# Patient Record
Sex: MALE | Race: BLACK OR AFRICAN AMERICAN | NOT HISPANIC OR LATINO | Employment: OTHER | URBAN - METROPOLITAN AREA
[De-identification: names, ages, dates, MRNs, and addresses within clinical notes are randomized per-mention and may not be internally consistent; named-entity substitution may affect disease eponyms.]

---

## 2017-02-06 ENCOUNTER — TELEPHONE (OUTPATIENT)
Dept: SMOKING CESSATION | Facility: CLINIC | Age: 67
End: 2017-02-06

## 2017-02-07 ENCOUNTER — TELEPHONE (OUTPATIENT)
Dept: SMOKING CESSATION | Facility: CLINIC | Age: 67
End: 2017-02-07

## 2017-02-13 ENCOUNTER — TELEPHONE (OUTPATIENT)
Dept: SMOKING CESSATION | Facility: CLINIC | Age: 67
End: 2017-02-13

## 2017-02-13 NOTE — TELEPHONE ENCOUNTER
3rd attempt unable to leave message regarding smoking cessation quit 1 episode will attempt another call at a later date.

## 2017-06-23 ENCOUNTER — TELEPHONE (OUTPATIENT)
Dept: INTERNAL MEDICINE | Facility: CLINIC | Age: 67
End: 2017-06-23

## 2017-06-23 NOTE — TELEPHONE ENCOUNTER
----- Message from Milla Nava sent at 6/22/2017  5:31 PM CDT -----  Contact: pt  PT lost his wife and would like to have someone to speak with and would prefer to speak to Dr. Tesfaye about it all.    PT callback:  [phone]

## 2017-06-24 ENCOUNTER — TELEPHONE (OUTPATIENT)
Dept: INTERNAL MEDICINE | Facility: CLINIC | Age: 67
End: 2017-06-24

## 2017-06-24 NOTE — TELEPHONE ENCOUNTER
----- Message from Harmony Alejandro sent at 6/23/2017  3:40 PM CDT -----  Contact: self [phone]  Patient  Is returning MD call . Please advise , Thanks !

## 2017-06-26 ENCOUNTER — TELEPHONE (OUTPATIENT)
Dept: INTERNAL MEDICINE | Facility: CLINIC | Age: 67
End: 2017-06-26

## 2017-06-26 NOTE — TELEPHONE ENCOUNTER
----- Message from Norma Flowers sent at 6/23/2017  4:49 PM CDT -----  Contact: Self/[phone]  Requesting an earlier appt date or time    Next available appt: 07/24/2017    Nature of the appt: Patient lost his spouse and needs to see you and also get a referral to speak to someone. Would like to get in the beginning of next week if next week if possible.    Does patient have appt scheduled?: No    Please call and advise.    Thanks

## 2017-07-10 ENCOUNTER — OFFICE VISIT (OUTPATIENT)
Dept: INTERNAL MEDICINE | Facility: CLINIC | Age: 67
End: 2017-07-10
Payer: MEDICARE

## 2017-07-10 ENCOUNTER — TELEPHONE (OUTPATIENT)
Dept: INTERNAL MEDICINE | Facility: CLINIC | Age: 67
End: 2017-07-10

## 2017-07-10 VITALS
HEART RATE: 85 BPM | SYSTOLIC BLOOD PRESSURE: 122 MMHG | BODY MASS INDEX: 20.67 KG/M2 | HEIGHT: 69 IN | WEIGHT: 139.56 LBS | DIASTOLIC BLOOD PRESSURE: 83 MMHG

## 2017-07-10 DIAGNOSIS — Z72.6 GAMBLING PROBLEM: ICD-10-CM

## 2017-07-10 DIAGNOSIS — F32.1 MODERATE SINGLE CURRENT EPISODE OF MAJOR DEPRESSIVE DISORDER: Primary | ICD-10-CM

## 2017-07-10 DIAGNOSIS — F43.21 GRIEF REACTION: ICD-10-CM

## 2017-07-10 DIAGNOSIS — F17.210 CIGARETTE SMOKER: ICD-10-CM

## 2017-07-10 PROCEDURE — 99213 OFFICE O/P EST LOW 20 MIN: CPT | Mod: PBBFAC | Performed by: INTERNAL MEDICINE

## 2017-07-10 PROCEDURE — 99214 OFFICE O/P EST MOD 30 MIN: CPT | Mod: S$PBB,,, | Performed by: INTERNAL MEDICINE

## 2017-07-10 PROCEDURE — 99999 PR PBB SHADOW E&M-EST. PATIENT-LVL III: CPT | Mod: PBBFAC,,, | Performed by: INTERNAL MEDICINE

## 2017-07-10 RX ORDER — SERTRALINE HYDROCHLORIDE 50 MG/1
50 TABLET, FILM COATED ORAL DAILY
Qty: 30 TABLET | Refills: 5 | Status: CANCELLED | OUTPATIENT
Start: 2017-07-10 | End: 2018-07-10

## 2017-07-10 RX ORDER — SERTRALINE HYDROCHLORIDE 50 MG/1
50 TABLET, FILM COATED ORAL DAILY
Qty: 30 TABLET | Refills: 11 | Status: SHIPPED | OUTPATIENT
Start: 2017-07-10 | End: 2017-11-09

## 2017-07-10 NOTE — TELEPHONE ENCOUNTER
----- Message from Norma Flowers sent at 7/10/2017  2:30 PM CDT -----  Contact: Self/[phone]  Patient was calling to get an update on the RX zoloft that was suppose to be called in to day after his visit.    Please call and advise.    Thank You

## 2017-07-10 NOTE — PROGRESS NOTES
"Subjective:       Patient ID: William Bueno is a 66 y.o. male.    Chief Complaint: Depression    HPI   Wife  2016 and in December his brother became ill with cancer recurrence and  in May.  He was very close to brother.  Feels exhausted and now aware f the great loss he has experienced..  Feels overwhelmed.  Feels sad.  previous depression in the 70's, tx with "vallium".  Appetite poor; lost at least 30 lbs.   May spend all day in bed.  Feels better at work, at desk, as patrolman.    Avoiding family, including first grandchild..  Wife suddenly at work as sitter.  He was  in  to his soul mate.  She feels like something was taken from here.  Feels "like a real part of him is gone"  Feels like he doesn't have closure with wife's death.  No suicidal thoughts - "too messy"  - doesn't want to hurt anyone else.  Also gambling, as he did before marrying   At that time was also abusing crack cocaine.  Friday gambled and drank 5 years.  That scared him and let him to make this appt.    Back smoking.  Dropped out of smoking cessation clinic.    Review of Systems   Constitutional: Negative for activity change and unexpected weight change.   Respiratory: Negative for chest tightness and shortness of breath.    Cardiovascular: Negative for chest pain and leg swelling.   Gastrointestinal: Negative for abdominal pain.   Neurological: Negative for headaches.   Psychiatric/Behavioral: Positive for dysphoric mood. Negative for sleep disturbance and suicidal ideas. The patient is nervous/anxious.        Objective:      Physical Exam   Constitutional: He is oriented to person, place, and time. He appears well-developed and well-nourished. No distress.   Neurological: He is alert and oriented to person, place, and time.   Skin: He is not diaphoretic.   Psychiatric: He has a normal mood and affect. His behavior is normal. Judgment and thought content normal.       Assessment:       1. Moderate single current " episode of major depressive disorder    2. Grief reaction    3. Gambling problem    4. Cigarette smoker        Plan:       William was seen today for depression.    Diagnoses and all orders for this visit:    Moderate single current episode of major depressive disorder    Grief reaction    Gambling problem    Cigarette smoker    Other orders  -     Cancel: sertraline (ZOLOFT) 50 MG tablet; Take 1 tablet (50 mg total) by mouth once daily.  -     sertraline (ZOLOFT) 50 MG tablet; Take 1 tablet (50 mg total) by mouth once daily.       Encouraged to begin therapy through Psychiatry.     Encouraged to stop counseling.      RTC few weeks.

## 2017-07-24 ENCOUNTER — OFFICE VISIT (OUTPATIENT)
Dept: PSYCHIATRY | Facility: CLINIC | Age: 67
End: 2017-07-24
Payer: MEDICARE

## 2017-07-24 DIAGNOSIS — F43.23 ADJUSTMENT DISORDER WITH MIXED ANXIETY AND DEPRESSED MOOD: Primary | ICD-10-CM

## 2017-07-24 PROCEDURE — 90791 PSYCH DIAGNOSTIC EVALUATION: CPT | Mod: PBBFAC | Performed by: SOCIAL WORKER

## 2017-07-24 PROCEDURE — 90791 PSYCH DIAGNOSTIC EVALUATION: CPT | Mod: S$PBB,,, | Performed by: SOCIAL WORKER

## 2017-07-25 ENCOUNTER — OFFICE VISIT (OUTPATIENT)
Dept: PSYCHIATRY | Facility: CLINIC | Age: 67
End: 2017-07-25
Payer: MEDICARE

## 2017-07-25 VITALS
HEART RATE: 108 BPM | DIASTOLIC BLOOD PRESSURE: 75 MMHG | SYSTOLIC BLOOD PRESSURE: 111 MMHG | HEIGHT: 69 IN | BODY MASS INDEX: 20.32 KG/M2 | WEIGHT: 137.19 LBS

## 2017-07-25 DIAGNOSIS — F41.1 GAD (GENERALIZED ANXIETY DISORDER): ICD-10-CM

## 2017-07-25 DIAGNOSIS — F33.1 MODERATE EPISODE OF RECURRENT MAJOR DEPRESSIVE DISORDER: Primary | ICD-10-CM

## 2017-07-25 DIAGNOSIS — G47.00 INSOMNIA, UNSPECIFIED TYPE: ICD-10-CM

## 2017-07-25 PROCEDURE — 99999 PR PBB SHADOW E&M-EST. PATIENT-LVL III: CPT | Mod: PBBFAC,,, | Performed by: INTERNAL MEDICINE

## 2017-07-25 PROCEDURE — 90792 PSYCH DIAG EVAL W/MED SRVCS: CPT | Mod: S$PBB,,, | Performed by: INTERNAL MEDICINE

## 2017-07-25 PROCEDURE — 99213 OFFICE O/P EST LOW 20 MIN: CPT | Mod: PBBFAC | Performed by: INTERNAL MEDICINE

## 2017-07-25 PROCEDURE — 90792 PSYCH DIAG EVAL W/MED SRVCS: CPT | Mod: PBBFAC | Performed by: INTERNAL MEDICINE

## 2017-07-25 NOTE — PROGRESS NOTES
"OUTPATIENT PSYCHIATRY INITIAL VISIT    ENCOUNTER DATE:  2017  SITE:  [facility]  REFFERAL SOURCE:  Self, Aaareferral  LENGTH OF SESSION:  50 minutes    CHIEF COMPLAINT:   Depression and Anxiety    HISTORY OF PRESENTING ILLNESS:  William Bueno is a 66 y.o. male with history of depression and anxiety who presents for initial assessment.  He was seen for intake by Gordo Bernstein LCSW, yesterday and it was recommended that he see Psychiatry for medication management.  He was started on Zoloft 25mg x 5 days with instructions to increase to 50mg daily by PCP on 7/10/17.    History as told by patient:  Feels "confused now."  Says he has grown up with strong women in his life.  States the women always did everything for him.  Wife passed away 1 year and 1 month ago.  She did so much for him.  Everything now seems overwhleming.   7 years, but imagined growing old with her.  Feels angry when he sees other older couples - why can't that be him.  Has good family support.  Also describes brother who  several months after wife passed away.  Says he got to speak to brother on his death bed.  But he never got to say goodbye to his wife, who  when she was at work.  Feels he is finally starting to grive her death.  Has moved out of their house into an apartment.  Money also stressful - how will he afford apartment.  Describes himself as a quiet person.  Feels alone during the day.  So now has tried to work 6 days a week sometimes 12 hours per day.  Recently has decreased his time of working (made change last week).  Has trouble saying no to people - wife used to buffer this some.  Now back to doing anything people ask.  Always wants to please people.  About 2 weeks ago started going to the [employer], which was an old pattern that he had stopped.  Drank 5 beers which is not him.  Feels exhausted and tired so much of the time.  Not sleeping well.  Trouble falling asleep and staying asleep.  " Yesterday took 2 hour nap during the day.  But only slept from 3am to 6am.  Feels mind is running and won't turn off.  Constantly worrying about things.  Wants help getting back on track.  Very hurtful that she is not here anymore - feels cheated.  Mood is down every day - misses laughing and smiling - just wants to be normal.  Laughed this morning and it felt so good.  Has always liked to get things accomplished and had big goals.  Has not enjoyed things since she , however on  went to see grandson which he enjoyed (had not seen in 3 months).  Was enjoying going to work but does not anymore.  Now just watches TV.  Would like to go to park and meet with other seniors for activities outside.  Trouble making himself do those things.  Has lost 30 pounds since her death and brother's - gained 1 pound today.  Last 2 days ate really well.  Now trying to cook more.  Has periods of hopelessness, but says he is optomistic overall - feels he just needs to work through this.  Knows he needs to take care of himself.  Says he has accomplished so much in life except doing things for himself.  Very Nondenominational.  Knows he has been blessed in his life.  Likes being around people, traveling, looking neat and nice.  Wife taught him to be more outgoing.  Started taking Zoloft on 17 - was taking 25mg at first - has only been on 50mg for 1 week, missed some doses.  Feels Zoloft has slowed him down so that he can concentrate on things.  Taking his time more.  Enjoying himself more.  Has been able to think through things more.  Feels it is working well.  Denies history of panic attacks, sandy, psychosis, or HI.    REVIEW OF SYSTEMS:  Complete review of systems performed covering Constitutional, Eyes, ENT/Mouth, Cardiovascular, Respiratory, Gastrointestinal, Genitourinary, Musculoskeletal, Skin, Neurologic, Endocrine, and Allergy/Immune.  All systems negative except for that covered in HPI.    Psych ROS covered elsewhere in  note (HPI)    PAST PSYCHIATRIC HISTORY:  Previous Psychiatric Diagnoses:  Depression and anxiety in the 1990's when mother passed away.  Previous Psychiatric Hospitalizations:  Denies   Previous SI/HI:  Denies  Previous Suicide Attempts:  Denies   Previous Medication Trials:  Was on Valium in 1990's  Psychiatric Care (current & past):  Saw psychiatrist in 1990's  History of Psychotherapy:  Just started seeing Gordo Bernstein, saw someone in 1990's  History of Violence:  Denies    SUBSTANCE ABUSE HISTORY:  Tobacco:  Smokes 1/2 ppd  Alcohol:  Used to drink a 6 pack per week - currently not drinking  Illicit Substances:  Previous history of crack cocaine abuse (stopped 2006) - in remission  Misuse of Prescription Medications:  Denies    MEDICAL HISTORY:  Past Medical History:   Diagnosis Date    Hypertension      NEUROLOGIC HISTORY:  Seizures:  Denies  Head trauma:  Denies    SOCIAL HISTORY:  History of Physical/Sexual Abuse:  Denies  Education:  High school graduate.  Employment:  Patient works as a  for [employer].  Relationship Status/Sexual Orientation:   and once .    Children:  One son (31), one daughter (37).  Housing Status:  Living alone  Restorationist:  Worship.   History:  Was in the [employer].  Access to Gun:  Leaves at work  Legal History:  Unpaid child support in 1996    FAMILY HISTORY:  Psychiatric:  Denies       H/o suicide:  Denies    MEDICATIONS:    Current Outpatient Prescriptions:     nicotine (NICODERM CQ) 14 mg/24 hr, Place 1 patch onto the skin once daily., Disp: 28 patch, Rfl: 0    nicotine polacrilex 4 MG Lozg, Nicorette Mini lozenges -one 4 mg lozenge by mouth as needed, max 5 in 6 hr period., Disp: 168 lozenge, Rfl: 0    sertraline (ZOLOFT) 50 MG tablet, Take 1 tablet (50 mg total) by mouth once daily., Disp: 30 tablet, Rfl: 11    tadalafil (CIALIS) 20 MG Tab, Half-1 tabs po q day prn, Disp: 10 tablet, Rfl: 0    varenicline (CHANTIX) 1 mg  "Tab, Take 1 tablet (1 mg total) by mouth 2 (two) times daily., Disp: 56 tablet, Rfl: 0    ALLERGIES:  Review of patient's allergies indicates:  No Known Allergies    PSYCHIATRIC EXAM:  Vitals:    07/25/17 0755   BP: 111/75   Pulse: 108   Weight: 62.2 kg (137 lb 3.2 oz)   Height: 5' 9" (1.753 m)     Appearance:  Well groomed, appearing healthy and of stated age  Behavior:  Cooperative, pleasant, no psychomotor agitation or retardation  Speech:  Normal rate, rhythm, prosody, and volume  Mood:  "Down"  Affect:  Congruent but positive  Thought Process:  Linear, logical, goal directed  Thought Content:  Negative for suicidal ideation, homicidal ideation, delusions or hallucinations.  Associations:  Intact  Memory:  Grossly Intact  Level of Consciousness/Orientation:  Grossly intact  Fund of Knowledge:  Good  Attention:  Good  Language:  Fluent, able to name abstract and concrete objects  Insight:  Good  Judgment:  Intact  Psychomotor signs:  No involuntary movements or tremor  Gait:  Normal    RELEVANT LABS/STUDIES:    Lab Results   Component Value Date    WBC 4.36 07/15/2015    HGB 12.2 (L) 07/15/2015    HCT 37.5 (L) 07/15/2015    MCV 94 07/15/2015     07/15/2015     BMP  Lab Results   Component Value Date     04/20/2015    K 3.8 04/20/2015     04/20/2015    CO2 25 04/20/2015    BUN 9 04/20/2015    CREATININE 1.3 04/20/2015    CALCIUM 9.5 04/20/2015    ANIONGAP 10 04/20/2015    ESTGFRAFRICA >60.0 04/20/2015    EGFRNONAA 58.1 (A) 04/20/2015     Lab Results   Component Value Date    ALT 13 04/15/2015    AST 18 04/15/2015    ALKPHOS 117 04/15/2015    BILITOT 0.7 04/15/2015     Lab Results   Component Value Date    TSH 0.748 04/15/2015       IMPRESSION:    William Bueno is a 66 y.o. male with history of depression and anxiety who presents for initial assessment in the setting of grieving the loss of his wife and brother over the last year.    DIAGNOSES:    ICD-10-CM ICD-9-CM   1. Moderate episode of " recurrent major depressive disorder F33.1 296.32   2. ARSEN (generalized anxiety disorder) F41.1 300.02   3. Insomnia, unspecified type G47.00 780.52     PLAN:  · Patient seems to be improving on Zoloft.  Will continue Zoloft 50mg daily for 1 month and then reassess symptoms.    · Patient declining medication for sleep at this time.  He feels that as his anxiety is improving with Zoloft, his sleep will also improve.  Discussed over-the-counter options of Benadryl and melatonin.  · Patient expresses understanding and agrees with plan.    RETURN TO CLINIC:  Return in about 4 weeks (around 8/22/2017).

## 2017-08-16 ENCOUNTER — TELEPHONE (OUTPATIENT)
Dept: SMOKING CESSATION | Facility: CLINIC | Age: 67
End: 2017-08-16

## 2017-08-17 ENCOUNTER — TELEPHONE (OUTPATIENT)
Dept: SMOKING CESSATION | Facility: CLINIC | Age: 67
End: 2017-08-17

## 2017-08-18 ENCOUNTER — TELEPHONE (OUTPATIENT)
Dept: SMOKING CESSATION | Facility: CLINIC | Age: 67
End: 2017-08-18

## 2017-08-18 NOTE — TELEPHONE ENCOUNTER
3rd attempt; 1 year telephone follow up regarding smoking cessation quit episode #2. Will resolve this episode.

## 2017-08-21 ENCOUNTER — TELEPHONE (OUTPATIENT)
Dept: INTERNAL MEDICINE | Facility: CLINIC | Age: 67
End: 2017-08-21

## 2017-08-21 NOTE — TELEPHONE ENCOUNTER
----- Message from Rene Martins sent at 8/21/2017  2:55 PM CDT -----  Contact: Self [phone]  Type: Returning a call    Who left a message? ?    When did the practice call? Just a few minutes ago    Comments:Requesting a call back, advice    Thanks

## 2017-10-11 ENCOUNTER — OFFICE VISIT (OUTPATIENT)
Dept: INTERNAL MEDICINE | Facility: CLINIC | Age: 67
End: 2017-10-11
Payer: MEDICARE

## 2017-10-11 VITALS
SYSTOLIC BLOOD PRESSURE: 140 MMHG | OXYGEN SATURATION: 98 % | DIASTOLIC BLOOD PRESSURE: 90 MMHG | TEMPERATURE: 99 F | BODY MASS INDEX: 20.35 KG/M2 | WEIGHT: 137.38 LBS | HEIGHT: 69 IN | HEART RATE: 75 BPM

## 2017-10-11 DIAGNOSIS — K40.90 NON-RECURRENT UNILATERAL INGUINAL HERNIA WITHOUT OBSTRUCTION OR GANGRENE: Primary | ICD-10-CM

## 2017-10-11 PROCEDURE — 99214 OFFICE O/P EST MOD 30 MIN: CPT | Mod: PBBFAC | Performed by: INTERNAL MEDICINE

## 2017-10-11 PROCEDURE — 99999 PR PBB SHADOW E&M-EST. PATIENT-LVL IV: CPT | Mod: PBBFAC,,, | Performed by: INTERNAL MEDICINE

## 2017-10-11 PROCEDURE — 99213 OFFICE O/P EST LOW 20 MIN: CPT | Mod: S$PBB,,, | Performed by: INTERNAL MEDICINE

## 2017-10-11 NOTE — PROGRESS NOTES
Subjective:       Patient ID: William Bueno is a 66 y.o. male.    Chief Complaint: Groin Pain (R side)    Noted lump in right groin  Not tender      Groin Pain   This is a new problem. The current episode started 1 to 4 weeks ago. The problem occurs every several days. The problem has been unchanged. The pain is mild. Pertinent negatives include no abdominal pain, chest pain, constipation, coughing, diarrhea, fever, flank pain, frequency, headaches, nausea, shortness of breath, sore throat or vomiting. The symptoms are aggravated by activity. He has tried nothing for the symptoms. The treatment provided no relief. Sexual activity: wife just .     Review of Systems   Constitutional: Negative for activity change, appetite change and fever.   HENT: Negative for congestion, postnasal drip and sore throat.    Respiratory: Negative for cough, shortness of breath and wheezing.    Cardiovascular: Negative for chest pain and palpitations.   Gastrointestinal: Negative for abdominal pain, blood in stool, constipation, diarrhea, nausea and vomiting.   Genitourinary: Negative for decreased urine volume, difficulty urinating, flank pain and frequency.   Musculoskeletal: Negative for arthralgias.   Neurological: Negative for dizziness, weakness and headaches.       Objective:      Physical Exam   Abdominal:           Assessment:       1. Non-recurrent unilateral inguinal hernia without obstruction or gangrene        Plan:   William was seen today for groin pain.    Diagnoses and all orders for this visit:    Non-recurrent unilateral inguinal hernia without obstruction or gangrene  -     Ambulatory consult to General Surgery

## 2017-10-12 ENCOUNTER — OFFICE VISIT (OUTPATIENT)
Dept: SURGERY | Facility: CLINIC | Age: 67
End: 2017-10-12
Payer: MEDICARE

## 2017-10-12 VITALS
HEART RATE: 71 BPM | SYSTOLIC BLOOD PRESSURE: 156 MMHG | DIASTOLIC BLOOD PRESSURE: 88 MMHG | BODY MASS INDEX: 19.99 KG/M2 | WEIGHT: 135 LBS | TEMPERATURE: 98 F | HEIGHT: 69 IN

## 2017-10-12 DIAGNOSIS — Z01.818 PRE-OP TESTING: Primary | ICD-10-CM

## 2017-10-12 DIAGNOSIS — K40.90 RIGHT INGUINAL HERNIA: ICD-10-CM

## 2017-10-12 PROCEDURE — 99213 OFFICE O/P EST LOW 20 MIN: CPT | Mod: PBBFAC | Performed by: SURGERY

## 2017-10-12 PROCEDURE — 99999 PR PBB SHADOW E&M-EST. PATIENT-LVL III: CPT | Mod: PBBFAC,,, | Performed by: SURGERY

## 2017-10-12 PROCEDURE — 99203 OFFICE O/P NEW LOW 30 MIN: CPT | Mod: S$PBB,,, | Performed by: SURGERY

## 2017-10-12 NOTE — PROGRESS NOTES
"History & Physical    SUBJECTIVE:     History of Present Illness:  Patient is a 66 y.o. male presents with right inguinal hernia.  Patient describes right groin discomfort and pain for the past two months. Pain occurs when lying on right side and stepping up or down. He describes the pain as sharp that radiates medially and inferiorly into the groin and lasts 10-15 seconds. There is no increased pain with increased abdominal pressure or with prolonged walking. Patient also reports pain in the left lateral thigh that is "burning" but likely due to a previous back injury. Patient denies nausea, vomiting, and changes in bowel habits. Patient denies recent illnesses, fevers, or weight loss.       Chief Complaint   Patient presents with    Hernia     [facility]       Review of patient's allergies indicates:  No Known Allergies    Current Outpatient Prescriptions   Medication Sig Dispense Refill    nicotine (NICODERM CQ) 14 mg/24 hr Place 1 patch onto the skin once daily. 28 patch 0    nicotine polacrilex 4 MG Lozg Nicorette Mini lozenges -one 4 mg lozenge by mouth as needed, max 5 in 6 hr period. 168 lozenge 0    sertraline (ZOLOFT) 50 MG tablet Take 1 tablet (50 mg total) by mouth once daily. 30 tablet 11    tadalafil (CIALIS) 20 MG Tab Half-1 tabs po q day prn 10 tablet 0    varenicline (CHANTIX) 1 mg Tab Take 1 tablet (1 mg total) by mouth 2 (two) times daily. 56 tablet 0     No current facility-administered medications for this visit.        Past Medical History:   Diagnosis Date    Hypertension      Past Surgical History:   Procedure Laterality Date    TONSILLECTOMY       Family History   Problem Relation Age of Onset    Diabetes Mother     Heart disease Father      chf     Social History   Substance Use Topics    Smoking status: Former Smoker     Packs/day: 0.50     Years: 45.00     Types: Cigarettes     Quit date: 9/20/2015    Smokeless tobacco: Never Used    Alcohol use 2.4 oz/week     1 Standard drinks or " "equivalent, 3 Cans of beer per week        Review of Systems:  Review of Systems   Constitutional: Negative for activity change, appetite change, fatigue and fever.   HENT: Negative.    Eyes: Negative.    Respiratory: Negative for chest tightness and stridor.    Cardiovascular: Negative for chest pain and leg swelling.   Gastrointestinal: Negative for abdominal distention, abdominal pain, blood in stool, constipation, diarrhea, nausea and vomiting.   Endocrine: Negative.    Genitourinary: Negative for difficulty urinating, dysuria, scrotal swelling and testicular pain.   Musculoskeletal: Positive for back pain and myalgias.        From previous back strain unrelated to acute symptoms.   Skin: Negative.    Allergic/Immunologic: Negative.    Neurological: Negative.    Hematological: Negative.    Psychiatric/Behavioral: Negative.        OBJECTIVE:     Vital Signs (Most Recent)  Temp: 98.3 °F (36.8 °C) (10/12/17 1211)  Pulse: 71 (10/12/17 1211)  BP: (!) 156/88 (10/12/17 1211)  5' 9" (1.753 m)  61.2 kg (135 lb)     Physical Exam:  Physical Exam   Constitutional: He is oriented to person, place, and time. He appears well-developed and well-nourished. No distress.   Cardiovascular: Normal rate and regular rhythm.    Pulmonary/Chest: Effort normal and breath sounds normal.   Abdominal: Soft. Bowel sounds are normal. He exhibits no distension. There is no tenderness. A hernia is present. Hernia confirmed positive in the right inguinal area.       Genitourinary: Testes normal.         Musculoskeletal: Normal range of motion.   Neurological: He is alert and oriented to person, place, and time.   Psychiatric: He has a normal mood and affect. His behavior is normal.       Laboratory  None recent    Diagnostic Results:  None applicable    ASSESSMENT/PLAN:     Rih    PLAN:Plan     For open rih with mesh, MAC.       "

## 2017-10-12 NOTE — Clinical Note
October 12, 2017      Samanta Tomas MD  [address]  [place] [address]           [facility]  [address]  [place] [address]  Phone: [phone]          Patient: William Bueno   MR Number: [id number]   YOB: 1950   Date of Visit: 10/12/2017       Dear Dr. Samanta Tomas:    Thank you for referring William Bueno to me for evaluation. Attached you will find relevant portions of my assessment and plan of care.    If you have questions, please do not hesitate to call me. I look forward to following William Bueno along with you.    Sincerely,    Elder Watson MD    Enclosure  CC:  No Recipients    If you would like to receive this communication electronically, please contact [email or web address] or [phone] to request more information on [employer] Link access.    For providers and/or their staff who would like to refer a patient to [facility], please contact us through our one-stop-shop provider referral line, [facility] , at [phone].    If you feel you have received this communication in error or would no longer like to receive these types of communications, please e-mail [email or web address]

## 2017-10-12 NOTE — LETTER
[facility] - General Surgery  [address]  [place] [address]  Phone: [phone] October 12, 2017      Samanta Tomas MD  [address]  [place] [address]    Patient: William Bueno   MR Number: [id number]   YOB: 1950   Date of Visit: 10/12/2017     Dear Dr. Tomas:    Thank you for referring William Bueno to me for evaluation. Below are the relevant portions of my assessment and plan of care.    Patient presents with RIH.     PLAN:   - For open RIHh with mesh, MAC.     If you have questions, please do not hesitate to call me. I look forward to following William along with you.    Sincerely,      Elder Watson MD   Section Head - General, Laparoscopic, Bariatric  Acute Care and Oncologic Surgery   - Surgical Weight Loss Program  [facility]    WSR/alison    CC  Lexie Tesfaye MD

## 2017-11-02 ENCOUNTER — TELEPHONE (OUTPATIENT)
Dept: INTERNAL MEDICINE | Facility: CLINIC | Age: 67
End: 2017-11-02
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
## 2017-11-03 ENCOUNTER — TELEPHONE (OUTPATIENT)
Dept: INTERNAL MEDICINE | Facility: CLINIC | Age: 67
End: 2017-11-03

## 2017-11-03 NOTE — TELEPHONE ENCOUNTER
----- Message from Lakesha Patton sent at 11/3/2017 10:52 AM CDT -----  Contact: Self/ [phone]   Type: Returning a call    Who left a message? Sofi     When did the practice call? Yesterday     Comments: pt is stated he miss a call from the office. Please call and advise     Thank you

## 2017-11-06 ENCOUNTER — OFFICE VISIT (OUTPATIENT)
Dept: INTERNAL MEDICINE | Facility: CLINIC | Age: 67
End: 2017-11-06
Payer: MEDICARE

## 2017-11-06 ENCOUNTER — LAB VISIT (OUTPATIENT)
Dept: LAB | Facility: HOSPITAL | Age: 67
End: 2017-11-06
Payer: MEDICARE

## 2017-11-06 VITALS
HEART RATE: 91 BPM | OXYGEN SATURATION: 98 % | DIASTOLIC BLOOD PRESSURE: 82 MMHG | WEIGHT: 136.44 LBS | SYSTOLIC BLOOD PRESSURE: 120 MMHG | HEIGHT: 69 IN | BODY MASS INDEX: 20.21 KG/M2

## 2017-11-06 DIAGNOSIS — R41.3 AMNESIA MEMORY LOSS: Primary | ICD-10-CM

## 2017-11-06 DIAGNOSIS — R41.3 AMNESIA MEMORY LOSS: ICD-10-CM

## 2017-11-06 PROCEDURE — 99214 OFFICE O/P EST MOD 30 MIN: CPT | Mod: S$PBB,,, | Performed by: NURSE PRACTITIONER

## 2017-11-06 PROCEDURE — 99214 OFFICE O/P EST MOD 30 MIN: CPT | Mod: PBBFAC | Performed by: NURSE PRACTITIONER

## 2017-11-06 PROCEDURE — 99999 PR PBB SHADOW E&M-EST. PATIENT-LVL IV: CPT | Mod: PBBFAC,,, | Performed by: NURSE PRACTITIONER
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
## 2017-11-08 ENCOUNTER — HOSPITAL ENCOUNTER (OUTPATIENT)
Dept: CARDIOLOGY | Facility: CLINIC | Age: 67
Discharge: HOME | End: 2017-11-08
Attending: SURGERY
Payer: MEDICARE

## 2017-11-08 ENCOUNTER — HOSPITAL ENCOUNTER (OUTPATIENT)
Dept: RADIOLOGY | Facility: HOSPITAL | Age: 67
Discharge: HOME | End: 2017-11-08
Attending: SURGERY
Payer: MEDICARE

## 2017-11-08 DIAGNOSIS — Z01.818 PRE-OP TESTING: ICD-10-CM

## 2017-11-08 PROCEDURE — 93005 ELECTROCARDIOGRAM TRACING: CPT | Mod: PBBFAC | Performed by: INTERNAL MEDICINE

## 2017-11-08 PROCEDURE — 93010 ELECTROCARDIOGRAM REPORT: CPT | Mod: S$PBB,,, | Performed by: INTERNAL MEDICINE

## 2017-11-08 PROCEDURE — 71020 XR CHEST PA AND LATERAL: CPT | Mod: 26,,, | Performed by: RADIOLOGY

## 2017-11-08 PROCEDURE — 71020 XR CHEST PA AND LATERAL: CPT | Mod: TC

## 2017-11-09 ENCOUNTER — TELEPHONE (OUTPATIENT)
Dept: SURGERY | Facility: CLINIC | Age: 67
End: 2017-11-09

## 2017-11-09 ENCOUNTER — OFFICE VISIT (OUTPATIENT)
Dept: CARDIOLOGY | Facility: CLINIC | Age: 67
End: 2017-11-09
Payer: MEDICARE

## 2017-11-09 VITALS
BODY MASS INDEX: 20.24 KG/M2 | DIASTOLIC BLOOD PRESSURE: 89 MMHG | WEIGHT: 136.69 LBS | HEIGHT: 69 IN | SYSTOLIC BLOOD PRESSURE: 139 MMHG | HEART RATE: 80 BPM

## 2017-11-09 DIAGNOSIS — Z01.810 PRE-OPERATIVE CARDIOVASCULAR EXAMINATION: ICD-10-CM

## 2017-11-09 PROCEDURE — 99999 PR PBB SHADOW E&M-EST. PATIENT-LVL III: CPT | Mod: PBBFAC,GC,, | Performed by: STUDENT IN AN ORGANIZED HEALTH CARE EDUCATION/TRAINING PROGRAM

## 2017-11-09 PROCEDURE — 99204 OFFICE O/P NEW MOD 45 MIN: CPT | Mod: S$PBB,GC,, | Performed by: STUDENT IN AN ORGANIZED HEALTH CARE EDUCATION/TRAINING PROGRAM

## 2017-11-09 PROCEDURE — 99213 OFFICE O/P EST LOW 20 MIN: CPT | Mod: PBBFAC | Performed by: STUDENT IN AN ORGANIZED HEALTH CARE EDUCATION/TRAINING PROGRAM

## 2017-11-09 RX ORDER — SERTRALINE HYDROCHLORIDE 50 MG/1
50 TABLET, FILM COATED ORAL DAILY
COMMUNITY
End: 2018-01-11

## 2017-11-09 NOTE — ASSESSMENT & PLAN NOTE
- RCRI Class 1 risk conferring a 0.4% risk of MACE  - Based on AHA/ACC perioperative guidelines, no further cardiac workup necessary prior to surgery  - Would benefit from initiation of anti-hypertensive treatment if systolics stay above 140 consistently, ASCVD risk is also elevated and patient would benefit from statin therapy. Discussed with patient and he will take recommendations into consideration and follow up with his PCP.

## 2017-11-09 NOTE — PROGRESS NOTES
Cardiology Clinic Note  Reason for Visit: Pre-operative cardiac exam    HPI:   67 yo male with a history of hypertension who is referred to cardiology for pre-op cardiac examination for inguinal hernia repair. His HTN was lifestyle controlled and resolved so he is not on any medication for it.     He denies any cardiac history. Currently smoking half a pack of cigarettes per day. Denies early CAD in his family. He has no physical limitations. 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 No difficulty with stairs.     The 10-year ASCVD risk score ([place]alie[place] DU JrJuana, et al., 2013) is: 18.7%    Values used to calculate the score:      Age: 66 years      Sex: Male      Is Non- : Yes      Diabetic: No      Tobacco smoker: Yes      Systolic Blood Pressure: 139 mmHg      Is BP treated: No      HDL Cholesterol: 37 mg/dL      Total Cholesterol: 141 mg/dL  ROS:    Constitution: Negative for fever, chills, weight loss or gain.   HENT: Negative for sore throat, rhinorrhea, or headache.  Eyes: Negative for blurred or double vision.   Cardiovascular: See above  Pulmonary: Negative for SOB   Gastrointestinal: Negative for abdominal pain, nausea, vomiting, or diarrhea.   : Negative for dysuria.   Neurological: Negative for focal weakness or sensory changes.  PMH:     Past Medical History:   Diagnosis Date    Hypertension      Past Surgical History:   Procedure Laterality Date    TONSILLECTOMY       Allergies:   Review of patient's allergies indicates:  No Known Allergies  Medications:     Current Outpatient Prescriptions on File Prior to Visit   Medication Sig Dispense Refill    tadalafil (CIALIS) 20 MG Tab Half-1 tabs po q day prn 10 tablet 0    [DISCONTINUED] nicotine (NICODERM CQ) 14 mg/24 hr Place 1 patch onto the skin once daily. 28 patch 0    [DISCONTINUED] nicotine polacrilex 4 MG Lozg Nicorette Mini lozenges -one 4 mg lozenge by mouth as needed, max 5 in 6 hr  "period. 168 lozenge 0    [DISCONTINUED] sertraline (ZOLOFT) 50 MG tablet Take 1 tablet (50 mg total) by mouth once daily. 30 tablet 11    [DISCONTINUED] varenicline (CHANTIX) 1 mg Tab Take 1 tablet (1 mg total) by mouth 2 (two) times daily. 56 tablet 0     No current facility-administered medications on file prior to visit.      Social History:     Social History   Substance Use Topics    Smoking status: Current Every Day Smoker     Packs/day: 0.00     Years: 45.00     Types: Cigarettes     Last attempt to quit: 9/20/2015    Smokeless tobacco: Never Used    Alcohol use 2.4 oz/week     3 Cans of beer, 1 Standard drinks or equivalent per week      Comment: socially     Family History:     Family History   Problem Relation Age of Onset    Diabetes Mother     Heart disease Father      chf     Physical Exam:   /89 (BP Location: Left arm, Patient Position: Sitting, BP Method: Large (Automatic))   Pulse 80   Ht 5' 9" (1.753 m)   Wt 62 kg (136 lb 11 oz)   BMI 20.18 kg/m²      Constitutional: NAD, conversant  HEENT: Sclera anicteric, PERRLA, EOMI  Neck: No JVD, no carotid bruits  CV: RRR, no murmur, normal S1/S2  Pulm: CTAB, no wheezes, rales, or ronchi  GI: Abdomen soft, NTND, +BS  Extremities: No LE edema, warm and well perfused  Skin: No ecchymosis, erythema, or ulcers  Psych: AOx3, appropriate affect    Labs:     Lab Results   Component Value Date     11/08/2017    K 4.7 11/08/2017     11/08/2017    CO2 30 (H) 11/08/2017    BUN 19 11/08/2017    CREATININE 1.2 11/08/2017    ANIONGAP 6 (L) 11/08/2017     Lab Results   Component Value Date    HGBA1C 6.0 07/15/2015     No results found for: BNP, BNPTRIAGEBLO Lab Results   Component Value Date    WBC 5.13 11/08/2017    HGB 11.9 (L) 11/08/2017    HCT 35.2 (L) 11/08/2017     11/08/2017    GRAN 2.8 11/08/2017    GRAN 55.3 11/08/2017     Lab Results   Component Value Date    CHOL 141 04/20/2015    HDL 37 (L) 04/20/2015    LDLCALC 87.8 " 04/20/2015    TRIG 81 04/20/2015          Imaging:       No results found for: EF    EKG: NSR 70, LVH   Assessment:   65 yo male with a history of hypertension who is referred to cardiology for pre-op cardiac examination for inguinal hernia repair.    Plan:   Pre-operative cardiovascular examination  - RCRI Class 1 risk conferring a 0.4% risk of MACE  - Based on AHA/ACC perioperative guidelines, no further cardiac workup necessary prior to surgery  - Would benefit from initiation of anti-hypertensive treatment if systolics stay above 140 consistently, ASCVD risk is also elevated and patient would benefit from statin therapy. Discussed with patient and he will take recommendations into consideration and follow up with his PCP.      Signed:  Reese Sierra DO  Cardiology Fellow  Pager - [phone]  11/9/2017 12:58 PM

## 2017-11-09 NOTE — TELEPHONE ENCOUNTER
Left vm for pt to return phone call in reference to cardiology appt @ 1pm.    *also s/w pt daughter and left msg with her.

## 2017-11-09 NOTE — TELEPHONE ENCOUNTER
----- Message from Elder Watson MD sent at 11/9/2017  6:59 AM CST -----  Please obtain cardiology clearance.

## 2017-11-10 ENCOUNTER — SURGERY (OUTPATIENT)
Age: 67
End: 2017-11-10

## 2017-11-10 ENCOUNTER — ANESTHESIA (OUTPATIENT)
Dept: SURGERY | Facility: HOSPITAL | Age: 67
End: 2017-11-10
Payer: MEDICARE

## 2017-11-10 ENCOUNTER — ANESTHESIA EVENT (OUTPATIENT)
Dept: SURGERY | Facility: HOSPITAL | Age: 67
End: 2017-11-10
Payer: MEDICARE

## 2017-11-10 ENCOUNTER — HOSPITAL ENCOUNTER (OUTPATIENT)
Facility: HOSPITAL | Age: 67
Discharge: HOME | End: 2017-11-10
Attending: SURGERY | Admitting: SURGERY
Payer: MEDICARE

## 2017-11-10 DIAGNOSIS — K40.90 RIGHT INGUINAL HERNIA: ICD-10-CM

## 2017-11-10 DIAGNOSIS — Z87.19 S/P INGUINAL HERNIA REPAIR: Primary | ICD-10-CM

## 2017-11-10 DIAGNOSIS — K40.90 INGUINAL HERNIA: ICD-10-CM

## 2017-11-10 DIAGNOSIS — Z98.890 S/P INGUINAL HERNIA REPAIR: Primary | ICD-10-CM

## 2017-11-10 PROCEDURE — 25000003 PHARM REV CODE 250: Performed by: STUDENT IN AN ORGANIZED HEALTH CARE EDUCATION/TRAINING PROGRAM

## 2017-11-10 PROCEDURE — 37000008 HC ANESTHESIA 1ST 15 MINUTES: Performed by: SURGERY

## 2017-11-10 PROCEDURE — 36000706: Performed by: SURGERY

## 2017-11-10 PROCEDURE — 37000009 HC ANESTHESIA EA ADD 15 MINS: Performed by: SURGERY

## 2017-11-10 PROCEDURE — 36000707: Performed by: SURGERY

## 2017-11-10 PROCEDURE — 71000016 HC POSTOP RECOV ADDL HR: Performed by: SURGERY

## 2017-11-10 PROCEDURE — D9220A PRA ANESTHESIA: Mod: CRNA,,, | Performed by: NURSE ANESTHETIST, CERTIFIED REGISTERED

## 2017-11-10 PROCEDURE — 25000003 PHARM REV CODE 250: Performed by: SURGERY

## 2017-11-10 PROCEDURE — D9220A PRA ANESTHESIA: Mod: ANES,,, | Performed by: ANESTHESIOLOGY

## 2017-11-10 PROCEDURE — 63600175 PHARM REV CODE 636 W HCPCS: Performed by: ANESTHESIOLOGY

## 2017-11-10 PROCEDURE — C1781 MESH (IMPLANTABLE): HCPCS | Performed by: SURGERY

## 2017-11-10 PROCEDURE — 49505 PRP I/HERN INIT REDUC >5 YR: CPT | Mod: RT,GC,, | Performed by: SURGERY

## 2017-11-10 PROCEDURE — 63600175 PHARM REV CODE 636 W HCPCS: Performed by: SURGERY

## 2017-11-10 PROCEDURE — 63600175 PHARM REV CODE 636 W HCPCS: Performed by: NURSE ANESTHETIST, CERTIFIED REGISTERED

## 2017-11-10 PROCEDURE — 71000015 HC POSTOP RECOV 1ST HR: Performed by: SURGERY

## 2017-11-10 PROCEDURE — 25000003 PHARM REV CODE 250: Performed by: NURSE ANESTHETIST, CERTIFIED REGISTERED

## 2017-11-10 PROCEDURE — 71000044 HC DOSC ROUTINE RECOVERY FIRST HOUR: Performed by: SURGERY

## 2017-11-10 DEVICE — MESH PROLENE 3INX6IN 6/BX: Type: IMPLANTABLE DEVICE | Site: INGUINAL | Status: FUNCTIONAL

## 2017-11-10 RX ORDER — OXYCODONE AND ACETAMINOPHEN 5; 325 MG/1; MG/1
1 TABLET ORAL ONCE
Status: DISCONTINUED | OUTPATIENT
Start: 2017-11-10 | End: 2017-11-10

## 2017-11-10 RX ORDER — SODIUM CHLORIDE 0.9 % (FLUSH) 0.9 %
3 SYRINGE (ML) INJECTION
Status: DISCONTINUED | OUTPATIENT
Start: 2017-11-10 | End: 2017-11-10 | Stop reason: HOSPADM

## 2017-11-10 RX ORDER — OXYCODONE AND ACETAMINOPHEN 5; 325 MG/1; MG/1
1 TABLET ORAL ONCE
Status: COMPLETED | OUTPATIENT
Start: 2017-11-10 | End: 2017-11-10

## 2017-11-10 RX ORDER — OXYCODONE AND ACETAMINOPHEN 5; 325 MG/1; MG/1
1 TABLET ORAL EVERY 4 HOURS PRN
Qty: 31 TABLET | Refills: 0 | Status: SHIPPED | OUTPATIENT
Start: 2017-11-10 | End: 2018-01-11 | Stop reason: ALTCHOICE

## 2017-11-10 RX ORDER — SODIUM CHLORIDE 9 MG/ML
INJECTION, SOLUTION INTRAVENOUS CONTINUOUS
Status: DISCONTINUED | OUTPATIENT
Start: 2017-11-10 | End: 2017-11-10 | Stop reason: HOSPADM

## 2017-11-10 RX ORDER — FENTANYL CITRATE 50 UG/ML
INJECTION, SOLUTION INTRAMUSCULAR; INTRAVENOUS
Status: DISCONTINUED | OUTPATIENT
Start: 2017-11-10 | End: 2017-11-10

## 2017-11-10 RX ORDER — BUPIVACAINE HYDROCHLORIDE 2.5 MG/ML
INJECTION, SOLUTION EPIDURAL; INFILTRATION; INTRACAUDAL
Status: DISCONTINUED | OUTPATIENT
Start: 2017-11-10 | End: 2017-11-10 | Stop reason: HOSPADM

## 2017-11-10 RX ORDER — MIDAZOLAM HYDROCHLORIDE 1 MG/ML
INJECTION, SOLUTION INTRAMUSCULAR; INTRAVENOUS
Status: DISCONTINUED | OUTPATIENT
Start: 2017-11-10 | End: 2017-11-10

## 2017-11-10 RX ORDER — BACITRACIN 50000 [IU]/1
INJECTION, POWDER, FOR SOLUTION INTRAMUSCULAR
Status: DISCONTINUED | OUTPATIENT
Start: 2017-11-10 | End: 2017-11-10 | Stop reason: HOSPADM

## 2017-11-10 RX ORDER — PROPOFOL 10 MG/ML
VIAL (ML) INTRAVENOUS
Status: DISCONTINUED | OUTPATIENT
Start: 2017-11-10 | End: 2017-11-10

## 2017-11-10 RX ORDER — PROPOFOL 10 MG/ML
VIAL (ML) INTRAVENOUS CONTINUOUS PRN
Status: DISCONTINUED | OUTPATIENT
Start: 2017-11-10 | End: 2017-11-10

## 2017-11-10 RX ORDER — HYDROMORPHONE HYDROCHLORIDE 1 MG/ML
0.2 INJECTION, SOLUTION INTRAMUSCULAR; INTRAVENOUS; SUBCUTANEOUS EVERY 5 MIN PRN
Status: DISCONTINUED | OUTPATIENT
Start: 2017-11-10 | End: 2017-11-10 | Stop reason: HOSPADM

## 2017-11-10 RX ORDER — LIDOCAINE HCL/PF 100 MG/5ML
SYRINGE (ML) INTRAVENOUS
Status: DISCONTINUED | OUTPATIENT
Start: 2017-11-10 | End: 2017-11-10

## 2017-11-10 RX ADMIN — SODIUM CHLORIDE: 0.9 INJECTION, SOLUTION INTRAVENOUS at 09:11

## 2017-11-10 RX ADMIN — FENTANYL CITRATE 25 MCG: 50 INJECTION, SOLUTION INTRAMUSCULAR; INTRAVENOUS at 10:11

## 2017-11-10 RX ADMIN — HYDROMORPHONE HYDROCHLORIDE 0.2 MG: 1 INJECTION, SOLUTION INTRAMUSCULAR; INTRAVENOUS; SUBCUTANEOUS at 12:11

## 2017-11-10 RX ADMIN — BUPIVACAINE HYDROCHLORIDE 20 ML: 2.5 INJECTION, SOLUTION EPIDURAL; INFILTRATION; INTRACAUDAL; PERINEURAL at 10:11

## 2017-11-10 RX ADMIN — HYDROMORPHONE HYDROCHLORIDE 0.2 MG: 1 INJECTION, SOLUTION INTRAMUSCULAR; INTRAVENOUS; SUBCUTANEOUS at 01:11

## 2017-11-10 RX ADMIN — FENTANYL CITRATE 25 MCG: 50 INJECTION, SOLUTION INTRAMUSCULAR; INTRAVENOUS at 11:11

## 2017-11-10 RX ADMIN — PROPOFOL 100 MCG/KG/MIN: 10 INJECTION, EMULSION INTRAVENOUS at 10:11

## 2017-11-10 RX ADMIN — SODIUM CHLORIDE, SODIUM GLUCONATE, SODIUM ACETATE, POTASSIUM CHLORIDE, MAGNESIUM CHLORIDE, SODIUM PHOSPHATE, DIBASIC, AND POTASSIUM PHOSPHATE: .53; .5; .37; .037; .03; .012; .00082 INJECTION, SOLUTION INTRAVENOUS at 10:11

## 2017-11-10 RX ADMIN — MIDAZOLAM HYDROCHLORIDE 2 MG: 1 INJECTION, SOLUTION INTRAMUSCULAR; INTRAVENOUS at 10:11

## 2017-11-10 RX ADMIN — BUPIVACAINE HYDROCHLORIDE 15 ML: 2.5 INJECTION, SOLUTION EPIDURAL; INFILTRATION; INTRACAUDAL; PERINEURAL at 11:11

## 2017-11-10 RX ADMIN — LIDOCAINE HYDROCHLORIDE 60 MG: 20 INJECTION, SOLUTION INTRAVENOUS at 10:11

## 2017-11-10 RX ADMIN — OXYCODONE HYDROCHLORIDE AND ACETAMINOPHEN 1 TABLET: 5; 325 TABLET ORAL at 12:11

## 2017-11-10 RX ADMIN — Medication 2 G: at 10:11

## 2017-11-10 RX ADMIN — PROPOFOL 50 MG: 10 INJECTION, EMULSION INTRAVENOUS at 10:11

## 2017-11-10 RX ADMIN — BACITRACIN 50000 UNITS: 50000 INJECTION, POWDER, LYOPHILIZED, FOR SOLUTION INTRAMUSCULAR at 10:11

## 2017-11-10 NOTE — DISCHARGE INSTRUCTIONS
ACTIVITY LEVEL:  If you received sedation and/or an anesthetic, you may feel sleepy for several hours. Rest until you feel more awake. Gradually resume your normal activities in 2-3 days. No heavy lifting for 5-6 weeks. (Nothing heavier than a gallon of milk.) You will be able to return to light work 1-2 weeks after surgery. You may expect some discomfort after surgery. This will diminish gradually between two weeks and three months. Use this discomfort as a guideline for your level of activity. Do not be alarmed if you notice a lump or firmness under the scar for about 6 months after surgery. This is normal and should eventually disappear.  DIET:  At home, continue with liquids. If there is no nausea, you may eat a soft diet and gradually resume your normal diet.  BATHING:  _____ Open repair- You may shower in 2 days. Do not soak the incision for one week.  CARE:  On the day following your surgery, you may carefully remove the outer dressing. Underneath the dressing, there are small tape strips directly on your incision. Leave these on until you return for your clinic appointment. Replace the dressing after drying off. You may notice a small amount of old blood when you change your dressing. This is normal. If your dressing becomes saturated, change it and hold gentle pressure on the area for 15 minutes. If the bleeding continues, notify your surgeon. You may experience some bruising. This is normal. Men may also experience some testicular swelling. This is normal and can be decreased by wearing a scrotal support, which may be purchased at any drug store or athletic store.  MEDICATIONS:  You will receive instructions for any pain and/or antibiotic prescriptions. Pain medication should be taken only if needed, and as directed. Antibiotics, if ordered, should be taken as directed until the entire prescription is completed.    WHEN TO CALL THE DOCTOR:   Any obvious bleeding   Fever over 101ºF (38.4ºC)   Redness  around the surgical site   Purulent (pus) drainage   Persistent pain not relieved by the pain medication   Persistent nausea/vomiting

## 2017-11-10 NOTE — TRANSFER OF CARE
"Anesthesia Transfer of Care Note    Patient: William Bueno    Procedure(s) Performed: Procedure(s) (LRB):  REPAIR-HERNIA-INGUINAL-OPEN-RIGHT w/ mesh (Right)    Patient location: [facility]    Anesthesia Type: general    Transport from OR: Transported from OR on 6-10 L/min O2 by face mask with adequate spontaneous ventilation    Post pain: adequate analgesia    Post assessment: no apparent anesthetic complications and tolerated procedure well    Post vital signs: stable    Level of consciousness: sedated    Nausea/Vomiting: no nausea/vomiting    Complications: none    Transfer of care protocol was followed      Last vitals:   Visit Vitals  BP (!) 141/86 (BP Location: Right arm, Patient Position: Lying)   Pulse 89   Temp 36.8 °C (98.2 °F) (Temporal)   Resp 20   Ht 5' 9" (1.753 m)   Wt 61.7 kg (136 lb)   SpO2 100%   BMI 20.08 kg/m²     "

## 2017-11-10 NOTE — INTERVAL H&P NOTE
The patient has been examined and the H&P has been reviewed:    I concur with the findings and no changes have occurred since H&P was written.    Anesthesia/Surgery risks, benefits and alternative options discussed and understood by patient/family.          Active Hospital Problems    Diagnosis  POA    Inguinal hernia [K40.90]  Yes      Resolved Hospital Problems    Diagnosis Date Resolved POA   No resolved problems to display.

## 2017-11-10 NOTE — OP NOTE
[facility]  Surgery Department  Operative Note    SUMMARY     Patient: William Bueno  Date: 11/10/2017  MRN: [id number]    Date of Procedure: 11/10/2017     Procedure: Procedure(s) (LRB):  REPAIR-HERNIA-INGUINAL-OPEN-RIGHT w/ mesh (Right)     Surgeon(s) and Role:     * Elder Watson MD - Primary     * Raymundo Del Valle MD - Resident - Assisting        Pre-Operative Diagnosis: Right inguinal hernia [K40.90]    Post-Operative Diagnosis: Post-Op Diagnosis Codes:     * Right inguinal hernia [K40.90]    Anesthesia: Local MAC    Indications for Procedure:   William Bueno is a 66 y.o.  male with Hx of right inguinal hernia    Procedure in Detail:   INDICATIONS: William Bueno is a 66 y.o.male referred to my General Surgery Clinic with a history of a symptomatic, reducible inguinal bulge. The history and exam were consistent with inguinal hernia. We recommended an open inguinal hernia repair with mesh and the patient agreed to proceed. The patient signed informed consent and expressed understanding of the risks and benefits of surgery.     DESCRIPTION OF OPERATIVE PROCEDURE: The patient was identified in preoperative holding and brought back to the Operating Room. The patient was placed supine on the operating table and padded appropriately. Monitors were applied and monitored anesthesia care was initiated. His right groin was shaved with electric clippers and prepped and draped in the standard sterile surgical fashion. A timeout was performed and all team members present agreed this was the correct procedure on the correct side of the correct patient. We also confirmed administration of appropriate preoperative antibiotics.     We marked out an appropriate right inguinal incision and administered a mix of lidocaine and Marcaine to both the incision site as well as an ilioinguinal nerve block was performed. After assuring adequate local anesthesia, a 5 cm oblique skin incision was made.  Subcutaneous tissue was divided with Bovie electrocautery. This dissection was carried down through Tj fascia down to the aponeurosis of the external oblique. The aponeurosis of the external oblique was incised in line with its fibers, first with a scalpel and then Metzenbaum scissors, and this incision was extended to the external spermatic ring. The inguinal canal contents were bluntly encircled, first digitally and then with a Penrose drain. Weitlaner retractors were placed to facilitate the dissection. We proceeded to identify a direct inguinal hernia sac, as well as a tiny indirect inguinal hernia which was carefully dissected away from the inguinal canal contents until it could be reduced into the defect. We performed skeletonization of the spermatic cord. We then had appropriate borders of the inguinal canal identified and decided to repair the defect with a piece of polypropylene mesh. The mesh was cut to fit the defect appropriately and sewn in place with a running 2-0 Prolene suture superiorly and inferiorly after anchoring the mesh medially to the pubic tubercle.  The tails of the mesh were brought together around the cord structures creating a new internal ring that just admitted the tip of the finger. The mesh was inspected and noted to lie in appropriate position without any redundancy or tension. The testicle was pulled back down to the scrotum and there was noted to be no tension on the cord structures. The aponeurosis of the external oblique was closed with a running 3-0 Vicryl suture. Tj fascia was closed with a running 3-0 Vicryl suture\ and the skin was closed with a running subcuticular 4-0 vicryl suture. A sterile dressing was applied. The patient was then transported to the Recovery Room in stable condition. All sponge, instrument and needle counts were correct at the end of the case.     Dr. Watson was present and scrubbed for the entire procedure.        Estimated Blood Loss  (EBL): minimal    Complications: No    Specimens:   Specimen (12h ago through future)    None          Condition: Good    Disposition: PACU - hemodynamically stable.    Attestation: Dr. Watson was present and scrubbed for the all key portions of the procedure.      Raymundo Del Valle MD PGY II  [phone]

## 2017-11-10 NOTE — DISCHARGE SUMMARY
[facility]  General Surgery  Discharge Summary      Patient Name: William Bueno  MRN: [id number]  Admission Date: 11/10/2017  Hospital Length of Stay: 0 days  Discharge Date and Time:  11/10/2017 11:25 AM  Attending Physician: Elder Watson MD   Discharging Provider: Raymundo Hollingsworth MD  Primary Care Provider: Lexie Tesfaye MD     HPI: William Bueno is a 66 y.o. male with a hx of right inguinal hernia    Procedure(s) (LRB):  REPAIR-HERNIA-INGUINAL-OPEN-RIGHT w/ mesh (Right)     Hospital Course: William Bueno was admitted to the hospital for surgery, specifically right inguinal hernia, the surgery went well and the patient was transferred to the PACU and then home in stable condition. The patient's postoperative course was uncomplicated. The patient was deemed stable and was discharged on 11/10/2017.     Consults:     Significant Diagnostic Studies: Labs: BMP: No results for input(s): GLU, NA, K, CL, CO2, BUN, CREATININE, CALCIUM, MG in the last 48 hours. and CBC No results for input(s): WBC, HGB, HCT, PLT in the last 48 hours.    Pending Diagnostic Studies:     None        Final Active Diagnoses:    Diagnosis Date Noted POA    PRINCIPAL PROBLEM:  Inguinal hernia [K40.90] 11/10/2017 Yes    S/P right sided open inguinal hernia repair [Z98.890, Z87.19] 11/10/2017 Not Applicable      Problems Resolved During this Admission:    Diagnosis Date Noted Date Resolved POA      Discharged Condition: good    Disposition: Home or Self Care    Follow Up:  Follow-up Information     Elder Watson MD In 2 weeks.    Specialties:  General Surgery, Bariatrics  Why:  For wound re-check, Follow up  Contact information:  [facility] [address]  [phone]                 Patient Instructions:     Diet general     Shower on day dressing removed (No bath)   Scheduling Instructions: No submerging wound in water for 2 weeks, showering OK (no baths, hot tubs, pools, ocean, bayou, etc.)      Lifting restrictions   Scheduling Instructions: No heavy lifting of anything 10 lbs or greater for six weeks.     Other restrictions (specify):   Scheduling Instructions: No driving while on narcotic medications.     Call MD for:  temperature >100.4     Call MD for:  persistent nausea and vomiting or diarrhea     Call MD for:  severe uncontrolled pain     Call MD for:  redness, tenderness, or signs of infection (pain, swelling, redness, odor or green/yellow discharge around incision site)     Call MD for:  difficulty breathing or increased cough     Call MD for:  severe persistent headache     Call MD for:  worsening rash     Call MD for:  persistent dizziness, light-headedness, or visual disturbances     Call MD for:  increased confusion or weakness     Remove dressing in 48 hours   Order Comments: Leave steri-strips on until they fall off, only remove top dressing       Medications:  Reconciled Home Medications:   Current Discharge Medication List      START taking these medications    Details   oxyCODONE-acetaminophen (PERCOCET) 5-325 mg per tablet Take 1 tablet by mouth every 4 (four) hours as needed.  Qty: 31 tablet, Refills: 0         CONTINUE these medications which have NOT CHANGED    Details   sertraline (ZOLOFT) 50 MG tablet Take 50 mg by mouth once daily.      tadalafil (CIALIS) 20 MG Tab Half-1 tabs po q day prn  Qty: 10 tablet, Refills: 0    Associated Diagnoses: Erectile dysfunction, unspecified erectile dysfunction type             Raymundo Hollingsworth MD  General Surgery  [facility]

## 2017-11-10 NOTE — PROGRESS NOTES
I have personally interviewed and examined the patient. I have reviewed the notes, assessments and plans performed by Dr Sierra and I CONCUR with his documentation of William Bueno.

## 2017-11-10 NOTE — ANESTHESIA PREPROCEDURE EVALUATION
11/10/2017  William Bueno is a 66 y.o., male.    Anesthesia Evaluation         Review of Systems      Physical Exam  General:  Well nourished    Airway/Jaw/Neck:  Airway Findings: Mouth Opening: Normal Tongue: Normal  General Airway Assessment: Adult  Mallampati: II  Improves to I with phonation.  TM Distance: Normal, at least 6 cm      Dental:  Dental Findings: Upper Dentures, lower partial dentures   Chest/Lungs:  Chest/Lungs Findings: Clear to auscultation     Heart/Vascular:  Heart Findings: Rate: Normal  Rhythm: Regular Rhythm        Mental Status:  Mental Status Findings:  Cooperative         Anesthesia Plan  Type of Anesthesia, risks & benefits discussed:  Anesthesia Type:  MAC  Patient's Preference:   Intra-op Monitoring Plan:   Intra-op Monitoring Plan Comments:   Post Op Pain Control Plan:   Post Op Pain Control Plan Comments:   Induction:   IV  Beta Blocker:  Patient is not currently on a Beta-Blocker (No further documentation required).       Informed Consent: Patient understands risks and agrees with Anesthesia plan.  Questions answered. Anesthesia consent signed with patient.  ASA Score: 2     Day of Surgery Review of History & Physical:    H&P update referred to the surgeon.         Ready For Surgery From Anesthesia Perspective.

## 2017-11-10 NOTE — H&P (VIEW-ONLY)
"History & Physical    SUBJECTIVE:     History of Present Illness:  Patient is a 66 y.o. male presents with right inguinal hernia.  Patient describes right groin discomfort and pain for the past two months. Pain occurs when lying on right side and stepping up or down. He describes the pain as sharp that radiates medially and inferiorly into the groin and lasts 10-15 seconds. There is no increased pain with increased abdominal pressure or with prolonged walking. Patient also reports pain in the left lateral thigh that is "burning" but likely due to a previous back injury. Patient denies nausea, vomiting, and changes in bowel habits. Patient denies recent illnesses, fevers, or weight loss.       Chief Complaint   Patient presents with    Hernia     [facility]       Review of patient's allergies indicates:  No Known Allergies    Current Outpatient Prescriptions   Medication Sig Dispense Refill    nicotine (NICODERM CQ) 14 mg/24 hr Place 1 patch onto the skin once daily. 28 patch 0    nicotine polacrilex 4 MG Lozg Nicorette Mini lozenges -one 4 mg lozenge by mouth as needed, max 5 in 6 hr period. 168 lozenge 0    sertraline (ZOLOFT) 50 MG tablet Take 1 tablet (50 mg total) by mouth once daily. 30 tablet 11    tadalafil (CIALIS) 20 MG Tab Half-1 tabs po q day prn 10 tablet 0    varenicline (CHANTIX) 1 mg Tab Take 1 tablet (1 mg total) by mouth 2 (two) times daily. 56 tablet 0     No current facility-administered medications for this visit.        Past Medical History:   Diagnosis Date    Hypertension      Past Surgical History:   Procedure Laterality Date    TONSILLECTOMY       Family History   Problem Relation Age of Onset    Diabetes Mother     Heart disease Father      chf     Social History   Substance Use Topics    Smoking status: Former Smoker     Packs/day: 0.50     Years: 45.00     Types: Cigarettes     Quit date: 9/20/2015    Smokeless tobacco: Never Used    Alcohol use 2.4 oz/week     1 Standard drinks or " "equivalent, 3 Cans of beer per week        Review of Systems:  Review of Systems   Constitutional: Negative for activity change, appetite change, fatigue and fever.   HENT: Negative.    Eyes: Negative.    Respiratory: Negative for chest tightness and stridor.    Cardiovascular: Negative for chest pain and leg swelling.   Gastrointestinal: Negative for abdominal distention, abdominal pain, blood in stool, constipation, diarrhea, nausea and vomiting.   Endocrine: Negative.    Genitourinary: Negative for difficulty urinating, dysuria, scrotal swelling and testicular pain.   Musculoskeletal: Positive for back pain and myalgias.        From previous back strain unrelated to acute symptoms.   Skin: Negative.    Allergic/Immunologic: Negative.    Neurological: Negative.    Hematological: Negative.    Psychiatric/Behavioral: Negative.        OBJECTIVE:     Vital Signs (Most Recent)  Temp: 98.3 °F (36.8 °C) (10/12/17 1211)  Pulse: 71 (10/12/17 1211)  BP: (!) 156/88 (10/12/17 1211)  5' 9" (1.753 m)  61.2 kg (135 lb)     Physical Exam:  Physical Exam   Constitutional: He is oriented to person, place, and time. He appears well-developed and well-nourished. No distress.   Cardiovascular: Normal rate and regular rhythm.    Pulmonary/Chest: Effort normal and breath sounds normal.   Abdominal: Soft. Bowel sounds are normal. He exhibits no distension. There is no tenderness. A hernia is present. Hernia confirmed positive in the right inguinal area.       Genitourinary: Testes normal.         Musculoskeletal: Normal range of motion.   Neurological: He is alert and oriented to person, place, and time.   Psychiatric: He has a normal mood and affect. His behavior is normal.       Laboratory  None recent    Diagnostic Results:  None applicable    ASSESSMENT/PLAN:     Rih    PLAN:Plan     For open rih with mesh, MAC.       "

## 2017-11-10 NOTE — BRIEF OP NOTE
Operative Note       Surgery Date: 11/10/2017     Surgeon(s) and Role:     * Elder Watson MD - Primary     * Raymundo Del Valle MD - Resident - Assisting    Pre-op Diagnosis:  Right inguinal hernia [K40.90]    Post-op Diagnosis:  Right inguinal hernia [K40.90]    Procedure(s) (LRB):  REPAIR-HERNIA-INGUINAL-OPEN-RIGHT w/ mesh (Right)    Anesthesia: Local MAC    Procedure in Detail/Findings:  Small direct hernia with smaller lipoma of cord and indirect hernia.  Repair with mesh.    Estimated Blood Loss: Minimal           Specimens     None        Implants:   Implant Name Type Inv. Item Serial No.  Lot No. LRB No. Used   MESH PROLENE [id number] 6/BX - [id number]   MESH PROLENE [id number] 6/BX   [employer] [id number] Right 1              Disposition: PACU - hemodynamically stable.           Condition: Good    Attestation:  I was present and scrubbed for the entire procedure.

## 2017-11-11 NOTE — ANESTHESIA POSTPROCEDURE EVALUATION
"Anesthesia Post Evaluation    Patient: William Bueno    Procedure(s) Performed: Procedure(s) (LRB):  REPAIR-HERNIA-INGUINAL-OPEN-RIGHT w/ mesh (Right)    Final Anesthesia Type: general  Patient location during evaluation: PACU  Patient participation: Yes- Able to Participate  Level of consciousness: awake and alert and oriented  Post-procedure vital signs: reviewed and stable  Pain management: adequate  Airway patency: patent  PONV status at discharge: No PONV  Anesthetic complications: no      Cardiovascular status: blood pressure returned to baseline and hemodynamically stable  Respiratory status: unassisted, spontaneous ventilation and room air  Hydration status: euvolemic  Follow-up not needed.        Visit Vitals  BP (!) 149/94   Pulse 81   Temp 37.2 °C (99 °F)   Resp 18   Ht 5' 9" (1.753 m)   Wt 61.7 kg (136 lb)   SpO2 100%   BMI 20.08 kg/m²       Pain/Scott Score: Pain Assessment Performed: Yes (11/10/2017  2:00 PM)  Presence of Pain: complains of pain/discomfort (11/10/2017  2:00 PM)  Pain Rating Prior to Med Admin: 6 (11/10/2017  1:44 PM)  Scott Score: 9 (11/10/2017 12:00 PM)      "

## 2017-11-13 VITALS
DIASTOLIC BLOOD PRESSURE: 94 MMHG | HEIGHT: 69 IN | WEIGHT: 136 LBS | TEMPERATURE: 99 F | SYSTOLIC BLOOD PRESSURE: 149 MMHG | RESPIRATION RATE: 18 BRPM | BODY MASS INDEX: 20.14 KG/M2 | HEART RATE: 81 BPM | OXYGEN SATURATION: 100 %

## 2017-11-24 ENCOUNTER — TELEPHONE (OUTPATIENT)
Dept: SURGERY | Facility: CLINIC | Age: 67
End: 2017-11-24

## 2017-11-24 NOTE — TELEPHONE ENCOUNTER
Returned pt phone call-notified him that pain and discomfort is normal, but as long it is getting better then he should be ok.  Pt to f/u on Tues.

## 2017-11-24 NOTE — TELEPHONE ENCOUNTER
----- Message from Quan Toth sent at 11/24/2017  2:34 PM CST -----  Patient states that (s)he needs to speak with nurse in ref to some pain in his groin area since sx and would like to know if this was normal//please call back at [phone]//thank you

## 2017-11-28 ENCOUNTER — OFFICE VISIT (OUTPATIENT)
Dept: SURGERY | Facility: CLINIC | Age: 67
End: 2017-11-28
Payer: MEDICARE

## 2017-11-28 VITALS
SYSTOLIC BLOOD PRESSURE: 119 MMHG | HEIGHT: 69 IN | TEMPERATURE: 98 F | WEIGHT: 139.56 LBS | DIASTOLIC BLOOD PRESSURE: 78 MMHG | HEART RATE: 85 BPM | BODY MASS INDEX: 20.67 KG/M2

## 2017-11-28 DIAGNOSIS — Z98.890 S/P INGUINAL HERNIA REPAIR: Primary | ICD-10-CM

## 2017-11-28 DIAGNOSIS — Z87.19 S/P INGUINAL HERNIA REPAIR: Primary | ICD-10-CM

## 2017-11-28 PROBLEM — K40.90 INGUINAL HERNIA: Status: RESOLVED | Noted: 2017-11-10 | Resolved: 2017-11-28

## 2017-11-28 PROBLEM — K40.90 RIGHT INGUINAL HERNIA: Status: RESOLVED | Noted: 2017-10-12 | Resolved: 2017-11-28

## 2017-11-28 PROCEDURE — 99999 PR PBB SHADOW E&M-EST. PATIENT-LVL III: CPT | Mod: PBBFAC,,, | Performed by: SURGERY

## 2017-11-28 PROCEDURE — 99213 OFFICE O/P EST LOW 20 MIN: CPT | Mod: PBBFAC | Performed by: SURGERY

## 2017-11-28 PROCEDURE — 99024 POSTOP FOLLOW-UP VISIT: CPT | Mod: ,,, | Performed by: SURGERY

## 2017-11-28 NOTE — PROGRESS NOTES
"William Bueno is a 66 y.o. male patient.   No diagnosis found.  Past Medical History:   Diagnosis Date    Hypertension      No past surgical history pertinent negatives on file.  Scheduled Meds:  Continuous Infusions:  PRN Meds:    Review of patient's allergies indicates:  No Known Allergies  There are no hospital problems to display for this patient.    Blood pressure 119/78, pulse 85, temperature 97.6 °F (36.4 °C), height 5' 9" (1.753 m), weight 63.3 kg (139 lb 8.8 oz).    Subjective S/p open rih 11/10/17.  He has 2-3/10 pain in the right groin and testicle and it is improving.  He was on his feet walking for about 7 hours yesterday and had some pain afterwards.  Objective Wound clear, no hernias.   Assessment & Plan Doing well after surgery.  Light duty 4 more weeks and rtc prn.       Elder Watson MD  11/28/2017  "

## 2017-11-28 NOTE — LETTER
[facility] - General Surgery  [address]  [facility] [address]  Phone: [phone] November 28, 2017      Lexie Tesfaye MD  [address]  [facility] [address]    Patient: William Bueno   MR Number: [id number]   YOB: 1950   Date of Visit: 11/28/2017     Dear Dr. Tesfaye:    Thank you for referring William Bueno to me for evaluation. Below are the relevant portions of my assessment and plan of care.    Patient is status post open RIH 11/10/17.  He has 2-3/10 pain in the right groin and testicle and it is improving.  He was on his feet walking for about 7 hours yesterday and had some pain afterwards.    PLAN: Doing well after surgery.  Light duty 4 more weeks and RTC PRN.    If you have questions, please do not hesitate to call me. I look forward to following William along with you.    Sincerely,      Elder Watson MD   Section Head - General, Laparoscopic, Bariatric  Acute Care and Oncologic Surgery   - Surgical Weight Loss Program  [facility]    [employer]

## 2017-12-21 DIAGNOSIS — N52.9 ERECTILE DYSFUNCTION, UNSPECIFIED ERECTILE DYSFUNCTION TYPE: ICD-10-CM

## 2017-12-21 RX ORDER — TADALAFIL 20 MG/1
TABLET ORAL
Qty: 10 TABLET | Refills: 0 | Status: SHIPPED | OUTPATIENT
Start: 2017-12-21 | End: 2018-01-11

## 2017-12-21 RX ORDER — TADALAFIL 20 MG/1
TABLET ORAL
Qty: 10 TABLET | Refills: 0 | Status: SHIPPED | OUTPATIENT
Start: 2017-12-21 | End: 2017-12-21 | Stop reason: SDUPTHER

## 2018-01-05 ENCOUNTER — PES CALL (OUTPATIENT)
Dept: ADMINISTRATIVE | Facility: CLINIC | Age: 68
End: 2018-01-05

## 2018-01-05 ENCOUNTER — CLINICAL SUPPORT (OUTPATIENT)
Dept: SMOKING CESSATION | Facility: CLINIC | Age: 68
End: 2018-01-05
Payer: COMMERCIAL

## 2018-01-05 DIAGNOSIS — F17.200 NICOTINE DEPENDENCE: Primary | ICD-10-CM

## 2018-01-05 PROCEDURE — 99407 BEHAV CHNG SMOKING > 10 MIN: CPT | Mod: S$GLB,,,

## 2018-01-11 ENCOUNTER — OFFICE VISIT (OUTPATIENT)
Dept: INTERNAL MEDICINE | Facility: CLINIC | Age: 68
End: 2018-01-11
Payer: MEDICARE

## 2018-01-11 ENCOUNTER — IMMUNIZATION (OUTPATIENT)
Dept: INTERNAL MEDICINE | Facility: CLINIC | Age: 68
End: 2018-01-11
Payer: MEDICARE

## 2018-01-11 ENCOUNTER — CLINICAL SUPPORT (OUTPATIENT)
Dept: SMOKING CESSATION | Facility: CLINIC | Age: 68
End: 2018-01-11
Payer: COMMERCIAL

## 2018-01-11 ENCOUNTER — LAB VISIT (OUTPATIENT)
Dept: LAB | Facility: HOSPITAL | Age: 68
End: 2018-01-11
Payer: MEDICARE

## 2018-01-11 VITALS
HEIGHT: 69 IN | TEMPERATURE: 98 F | SYSTOLIC BLOOD PRESSURE: 142 MMHG | BODY MASS INDEX: 21.25 KG/M2 | WEIGHT: 143.5 LBS | OXYGEN SATURATION: 98 % | HEART RATE: 74 BPM | DIASTOLIC BLOOD PRESSURE: 98 MMHG

## 2018-01-11 VITALS
HEART RATE: 68 BPM | DIASTOLIC BLOOD PRESSURE: 94 MMHG | SYSTOLIC BLOOD PRESSURE: 130 MMHG | WEIGHT: 143.06 LBS | BODY MASS INDEX: 21.13 KG/M2

## 2018-01-11 DIAGNOSIS — F17.210 TOBACCO DEPENDENCE DUE TO CIGARETTES: Primary | ICD-10-CM

## 2018-01-11 DIAGNOSIS — Z11.59 NEED FOR HEPATITIS C SCREENING TEST: ICD-10-CM

## 2018-01-11 DIAGNOSIS — Z23 NEED FOR VACCINATION AGAINST STREPTOCOCCUS PNEUMONIAE: ICD-10-CM

## 2018-01-11 DIAGNOSIS — N52.9 ERECTILE DYSFUNCTION, UNSPECIFIED ERECTILE DYSFUNCTION TYPE: ICD-10-CM

## 2018-01-11 DIAGNOSIS — D64.9 ANEMIA, UNSPECIFIED TYPE: ICD-10-CM

## 2018-01-11 DIAGNOSIS — I10 ESSENTIAL HYPERTENSION: ICD-10-CM

## 2018-01-11 DIAGNOSIS — Z00.00 ENCOUNTER FOR PREVENTIVE HEALTH EXAMINATION: Primary | ICD-10-CM

## 2018-01-11 DIAGNOSIS — F32.5 MAJOR DEPRESSIVE DISORDER WITH SINGLE EPISODE, IN FULL REMISSION: ICD-10-CM

## 2018-01-11 DIAGNOSIS — J43.2 CENTRILOBULAR EMPHYSEMA: ICD-10-CM

## 2018-01-11 DIAGNOSIS — Z13.6 ENCOUNTER FOR ABDOMINAL AORTIC ANEURYSM SCREENING: ICD-10-CM

## 2018-01-11 PROBLEM — F14.11 COCAINE ABUSE IN REMISSION: Status: ACTIVE | Noted: 2018-01-11

## 2018-01-11 PROCEDURE — 99404 PREV MED CNSL INDIV APPRX 60: CPT | Mod: S$GLB,,,

## 2018-01-11 PROCEDURE — G0009 ADMIN PNEUMOCOCCAL VACCINE: HCPCS | Mod: PBBFAC

## 2018-01-11 PROCEDURE — G0439 PPPS, SUBSEQ VISIT: HCPCS | Mod: S$GLB,,, | Performed by: NURSE PRACTITIONER

## 2018-01-11 PROCEDURE — 90662 IIV NO PRSV INCREASED AG IM: CPT | Mod: PBBFAC

## 2018-01-11 PROCEDURE — 99999 PR PBB SHADOW E&M-EST. PATIENT-LVL IV: CPT | Mod: PBBFAC,,, | Performed by: NURSE PRACTITIONER

## 2018-01-11 PROCEDURE — 86803 HEPATITIS C AB TEST: CPT

## 2018-01-11 PROCEDURE — 36415 COLL VENOUS BLD VENIPUNCTURE: CPT

## 2018-01-11 PROCEDURE — 99214 OFFICE O/P EST MOD 30 MIN: CPT | Mod: PBBFAC,25 | Performed by: NURSE PRACTITIONER

## 2018-01-11 NOTE — PROGRESS NOTES
"William Bueno presented for a  Medicare AWV and comprehensive Health Risk Assessment today. The following components were reviewed and updated:    · Medical history  · Family History  · Social history  · Allergies and Current Medications  · Health Risk Assessment  · Health Maintenance  · Care Team     ** See Completed Assessments for Annual Wellness Visit within the encounter summary.**       The following assessments were completed:  · Living Situation  · CAGE  · Depression Screening  · Timed Get Up and Go  · Whisper Test  · Cognitive Function Screening  · Nutrition Screening  · ADL Screening  · PAQ Screening    Vitals:    01/11/18 1156   BP: (!) 142/98   Pulse: 74   Temp: 98.4 °F (36.9 °C)   SpO2: 98%   Weight: 65.1 kg (143 lb 8.3 oz)   Height: 5' 9" (1.753 m)     Body mass index is 21.19 kg/m².  Physical Exam   Constitutional: He is oriented to person, place, and time. He appears well-developed and well-nourished. No distress.   HENT:   Head: Normocephalic and atraumatic.   Eyes: No scleral icterus.   Neck: Normal range of motion. Neck supple.   Cardiovascular: Normal rate, regular rhythm and normal heart sounds.    Pulmonary/Chest: Effort normal and breath sounds normal. No respiratory distress. He has no wheezes. He has no rales.   Abdominal: Soft. Bowel sounds are normal.   Musculoskeletal: He exhibits no edema.   Neurological: He is alert and oriented to person, place, and time.   Skin: Skin is warm and dry. He is not diaphoretic.   Psychiatric: He has a normal mood and affect. His behavior is normal.   Nursing note and vitals reviewed.        Diagnoses and health risks identified today and associated recommendations/orders:    1. Encounter for preventive health examination  - Screenings performed, as noted above. Personal preventative testing needs reviewed.     2. Encounter for abdominal aortic aneurysm screening  - US AAA Screening; Future    3. Need for vaccination against Streptococcus pneumoniae  - " PNEUMOCOCCAL CONJUGATE VACCINE 13-VALENT LESS THAN 6YO & GREATER THAN 51YO IM    4. Need for hepatitis C screening test  - Hepatitis C antibody; Future    5. Centrilobular emphysema  - Stable, enrolled in smoking cessation. Seeing smoking cessation nurse today.     6. Erectile dysfunction, unspecified erectile dysfunction type  - Unable to afford Cialis.  Will reach out to patient assistance program to inquire about other promotions for ED medications and f/u with patient.     7. Anemia, unspecified type  - Stable, followed by PCP    8. Major depressive disorder with single episode, in full remission  - Symptom free since stopping Zoloft in 11/2017.     9. Essential hypertension  - Mildly elevated in clinic today.  Followed by PCP.       Provided William with a 5-10 year written screening schedule and personal prevention plan. Recommendations were developed using the USPSTF age appropriate recommendations. Education, counseling, and referrals were provided as needed. After Visit Summary printed and given to patient which includes a list of additional screenings\tests needed.    I offered to discuss end of life issues, including information on how to make advance directives that the patient could use to name someone who would make medical decisions on their behalf if they became too ill to make themselves.    ___Patient declined  _X_Patient is interested, I provided paper work and offered to discuss.    Return in about 1 year (around 1/11/2019) for your next annual wellness visit.    Milla Brady NP

## 2018-01-11 NOTE — PATIENT INSTRUCTIONS
Counseling and Referral of Other Preventative  (Italic type indicates deductible and co-insurance are waived)    Patient Name: William Bueno  Today's Date: 1/11/2018    Health Maintenance       Date Due Completion Date    Hepatitis C Screening 1950 Today    TETANUS VACCINE 12/24/1969 Declines today    Zoster Vaccine 12/24/2011 Declines today    Pneumococcal (65+) (1 of 2 - PCV13) 12/24/2016 Shot 1 of 2 given today, next due in 1 year    Abdominal Aortic Aneurysm Screening 12/24/2016 Ordered, please schedule appointment    Influenza Vaccine 08/01/2017 Today    Lipid Panel 04/20/2020 4/20/2015    Colonoscopy 08/28/2025 8/28/2015        Orders Placed This Encounter   Procedures    US AAA Screening    PNEUMOCOCCAL CONJUGATE VACCINE 13-VALENT LESS THAN 4YO & GREATER THAN 51YO IM    Hepatitis C antibody     The following information is provided to all patients.  This information is to help you find resources for any of the problems found today that may be affecting your health:                Living healthy guide: www.[facility].[place].[facility]      Understanding Diabetes: www.diabetes.org      Eating healthy: www.cdc.gov/healthyweight      CDC home safety checklist: www.cdc.gov/steadi/patient.html      Agency on Aging: www.goea.[place].[facility]      Alcoholics anonymous (AA): www.aa.org      Physical Activity: www.cheryl.nih.gov/sy0bprj      Tobacco use: www.quitwithusla.org

## 2018-01-12 LAB — HCV AB SERPL QL IA: NEGATIVE

## 2018-01-12 NOTE — PROGRESS NOTES
1/11/18   See Smoking Cessation Smart Form    Additional Interventions:  · Recommended patient participate in Smoking Cessation Group .  · Discussed triggers and planning for quit date.  · Given patient education handouts from [employer] Tool Kit #3  · Educated patient about and gave patient education handouts from  [employer] Drug Information on:  NRT, Wellbutrin, Chantix  · Provided phone number to reach [facility] CTTS (Certified Tobacco Treatment Specialist) for future assistance and numbers to 24/7 Quit lines.    Patient would like to take Chantix again and meets the financial criteria to be eligible.  He is going to complete the patient section of the PAP form and bring it back to be sent in with the providers section.

## 2018-01-15 RX ORDER — VARENICLINE TARTRATE 0.5 (11)-1
KIT ORAL
Qty: 1 PACKAGE | Refills: 0
Start: 2018-01-15 | End: 2018-10-17 | Stop reason: SDUPTHER

## 2018-01-15 RX ORDER — DM/P-EPHED/ACETAMINOPH/DOXYLAM 30-7.5/3
LIQUID (ML) ORAL
Qty: 168 LOZENGE | Refills: 0 | Status: SHIPPED | OUTPATIENT
Start: 2018-01-15 | End: 2018-10-17

## 2018-01-15 RX ORDER — VARENICLINE TARTRATE 1 MG/1
1 TABLET, FILM COATED ORAL 2 TIMES DAILY
Qty: 56 TABLET | Refills: 1
Start: 2018-01-15 | End: 2019-11-14

## 2018-01-30 ENCOUNTER — TELEPHONE (OUTPATIENT)
Dept: SMOKING CESSATION | Facility: CLINIC | Age: 68
End: 2018-01-30

## 2018-01-30 NOTE — TELEPHONE ENCOUNTER
1/30/18    1:15 pm    Telephone call to follow up on patient's intent to participate in Smoking Cessation Group and fill out the paperwork for Patient Assistance Program to obtain Chantix from [employer].  Patient said transportation problems interfered with coming to group.  He said he sill intends to fill out the PAP paperwork and will either drop it off or send it in the mail.  I will call him again in 2 - 3 weeks.

## 2018-02-14 ENCOUNTER — TELEPHONE (OUTPATIENT)
Dept: SMOKING CESSATION | Facility: CLINIC | Age: 68
End: 2018-02-14

## 2018-02-14 NOTE — TELEPHONE ENCOUNTER
2/14/18   1:10 pm    Telephone call to patient to follow up on progress quitting smoking.  Left voice mail #1 for return call.

## 2018-04-05 ENCOUNTER — TELEPHONE (OUTPATIENT)
Dept: SMOKING CESSATION | Facility: CLINIC | Age: 68
End: 2018-04-05

## 2018-04-13 ENCOUNTER — CLINICAL SUPPORT (OUTPATIENT)
Dept: SMOKING CESSATION | Facility: CLINIC | Age: 68
End: 2018-04-13
Payer: COMMERCIAL

## 2018-04-13 DIAGNOSIS — F17.200 NICOTINE DEPENDENCE: Primary | ICD-10-CM

## 2018-04-13 PROCEDURE — 99407 BEHAV CHNG SMOKING > 10 MIN: CPT | Mod: S$GLB,,,

## 2018-07-02 ENCOUNTER — TELEPHONE (OUTPATIENT)
Dept: SMOKING CESSATION | Facility: CLINIC | Age: 68
End: 2018-07-02

## 2018-07-12 ENCOUNTER — TELEPHONE (OUTPATIENT)
Dept: SMOKING CESSATION | Facility: CLINIC | Age: 68
End: 2018-07-12

## 2018-07-23 ENCOUNTER — TELEPHONE (OUTPATIENT)
Dept: SMOKING CESSATION | Facility: CLINIC | Age: 68
End: 2018-07-23

## 2018-10-17 ENCOUNTER — OFFICE VISIT (OUTPATIENT)
Dept: INTERNAL MEDICINE | Facility: CLINIC | Age: 68
End: 2018-10-17
Payer: MEDICARE

## 2018-10-17 VITALS
OXYGEN SATURATION: 97 % | HEIGHT: 69 IN | SYSTOLIC BLOOD PRESSURE: 148 MMHG | DIASTOLIC BLOOD PRESSURE: 88 MMHG | BODY MASS INDEX: 21.84 KG/M2 | HEART RATE: 88 BPM | TEMPERATURE: 99 F | WEIGHT: 147.5 LBS

## 2018-10-17 DIAGNOSIS — Z12.5 PROSTATE CANCER SCREENING: ICD-10-CM

## 2018-10-17 DIAGNOSIS — J02.9 PHARYNGITIS, UNSPECIFIED ETIOLOGY: Primary | ICD-10-CM

## 2018-10-17 DIAGNOSIS — F17.210 TOBACCO DEPENDENCE DUE TO CIGARETTES: ICD-10-CM

## 2018-10-17 DIAGNOSIS — I10 ESSENTIAL HYPERTENSION: ICD-10-CM

## 2018-10-17 LAB — DEPRECATED S PYO AG THROAT QL EIA: NEGATIVE

## 2018-10-17 PROCEDURE — 99213 OFFICE O/P EST LOW 20 MIN: CPT | Mod: PBBFAC | Performed by: INTERNAL MEDICINE

## 2018-10-17 PROCEDURE — 3077F SYST BP >= 140 MM HG: CPT | Mod: CPTII,,, | Performed by: INTERNAL MEDICINE

## 2018-10-17 PROCEDURE — 3079F DIAST BP 80-89 MM HG: CPT | Mod: CPTII,,, | Performed by: INTERNAL MEDICINE

## 2018-10-17 PROCEDURE — 87880 STREP A ASSAY W/OPTIC: CPT

## 2018-10-17 PROCEDURE — 87081 CULTURE SCREEN ONLY: CPT

## 2018-10-17 PROCEDURE — 1101F PT FALLS ASSESS-DOCD LE1/YR: CPT | Mod: CPTII,,, | Performed by: INTERNAL MEDICINE

## 2018-10-17 PROCEDURE — 99214 OFFICE O/P EST MOD 30 MIN: CPT | Mod: S$PBB,,, | Performed by: INTERNAL MEDICINE

## 2018-10-17 PROCEDURE — 99999 PR PBB SHADOW E&M-EST. PATIENT-LVL III: CPT | Mod: PBBFAC,,, | Performed by: INTERNAL MEDICINE

## 2018-10-17 RX ORDER — FELODIPINE 5 MG/1
5 TABLET, EXTENDED RELEASE ORAL DAILY
Qty: 30 TABLET | Refills: 2 | Status: SHIPPED | OUTPATIENT
Start: 2018-10-17 | End: 2018-11-12 | Stop reason: SDUPTHER

## 2018-10-17 RX ORDER — VARENICLINE TARTRATE 1 MG/1
1 TABLET, FILM COATED ORAL 2 TIMES DAILY
Qty: 60 TABLET | Refills: 5 | Status: SHIPPED | OUTPATIENT
Start: 2018-10-17 | End: 2018-11-16

## 2018-10-17 RX ORDER — VARENICLINE TARTRATE 0.5 (11)-1
KIT ORAL
Qty: 1 PACKAGE | Refills: 0 | Status: SHIPPED | OUTPATIENT
Start: 2018-10-17 | End: 2019-11-14

## 2018-10-17 NOTE — PROGRESS NOTES
Subjective:       Patient ID: William Bueno is a 67 y.o. male.    Chief Complaint: Sore Throat    HPI   C/o sore throat x 2-3 weeks. 3/10 No runny nose, ear pain.  Mostly at night.  He feels smoking is contributing.  Feels better with drinking hot beverages.  Not coughing.  Tried otc analgesics.  No pnd, reflux.     Smokes about half ppd.  Took chantix in past, and wants to try it again.    Depression over wife's death has improved.      tx for htn in past.  BP has been elevated last few times he has visited clinic.  Review of Systems   Constitutional: Negative for activity change and unexpected weight change.   Respiratory: Negative for chest tightness and shortness of breath.    Cardiovascular: Negative for chest pain and leg swelling.   Gastrointestinal: Negative for abdominal pain.   Neurological: Negative for headaches.   Psychiatric/Behavioral: Negative for dysphoric mood.       Objective:      Physical Exam   Constitutional: He is oriented to person, place, and time. He appears well-developed and well-nourished.   HENT:   Mouth/Throat: Oropharynx is clear and moist. No oropharyngeal exudate.   Tm's clear   Eyes: Right eye exhibits no discharge. Left eye exhibits no discharge.   Neck: Normal range of motion. Neck supple.   Cardiovascular: Normal rate and regular rhythm.   Pulmonary/Chest: Effort normal and breath sounds normal. No respiratory distress. He has no wheezes. He has no rales.   Lymphadenopathy:     He has no cervical adenopathy.   Neurological: He is alert and oriented to person, place, and time.   Psychiatric: He has a normal mood and affect. His behavior is normal.       Assessment:       1. Pharyngitis, unspecified etiology    2. Tobacco dependence due to cigarettes    3. Essential hypertension    4. Prostate cancer screening        Plan:       William was seen today for sore throat.    Diagnoses and all orders for this visit:    Pharyngitis, unspecified etiology  -     Throat Screen,  Rapid    Tobacco dependence due to cigarettes  -     varenicline (CHANTIX STARTING MONTH BOX) 0.5 mg (11)- 1 mg (42) tablet; Take as directed on Starter Pack.    Essential hypertension  -     CBC auto differential; Future  -     Comprehensive metabolic panel; Future  -     Lipid panel; Future    Prostate cancer screening  -     PSA, Screening; Future    Other orders  -     varenicline (CHANTIX) 1 mg Tab; Take 1 tablet (1 mg total) by mouth 2 (two) times daily.  -   START  felodipine (PLENDIL) 5 MG 24 hr tablet; Take 1 tablet (5 mg total) by mouth once daily.       ENT of throat pain does not resolve.

## 2018-10-17 NOTE — PATIENT INSTRUCTIONS
Ibuprofen  200 mg   2-3 tabs up to 3 times a day for day.    Send me a message through [facility] if pain persists.    Start amlodipine for BLood pressure.

## 2018-10-18 ENCOUNTER — LAB VISIT (OUTPATIENT)
Dept: LAB | Facility: HOSPITAL | Age: 68
End: 2018-10-18
Attending: INTERNAL MEDICINE
Payer: MEDICARE

## 2018-10-18 DIAGNOSIS — Z12.5 PROSTATE CANCER SCREENING: ICD-10-CM

## 2018-10-18 DIAGNOSIS — I10 ESSENTIAL HYPERTENSION: ICD-10-CM

## 2018-10-18 LAB
ALBUMIN SERPL BCP-MCNC: 3.6 G/DL
ALP SERPL-CCNC: 102 U/L
ALT SERPL W/O P-5'-P-CCNC: 16 U/L
ANION GAP SERPL CALC-SCNC: 6 MMOL/L
AST SERPL-CCNC: 20 U/L
BASOPHILS # BLD AUTO: 0.03 K/UL
BASOPHILS NFR BLD: 0.6 %
BILIRUB SERPL-MCNC: 0.4 MG/DL
BUN SERPL-MCNC: 14 MG/DL
CALCIUM SERPL-MCNC: 9.4 MG/DL
CHLORIDE SERPL-SCNC: 105 MMOL/L
CHOLEST SERPL-MCNC: 170 MG/DL
CHOLEST/HDLC SERPL: 3.1 {RATIO}
CO2 SERPL-SCNC: 27 MMOL/L
COMPLEXED PSA SERPL-MCNC: 3.5 NG/ML
CREAT SERPL-MCNC: 1.1 MG/DL
DIFFERENTIAL METHOD: ABNORMAL
EOSINOPHIL # BLD AUTO: 0.1 K/UL
EOSINOPHIL NFR BLD: 2.4 %
ERYTHROCYTE [DISTWIDTH] IN BLOOD BY AUTOMATED COUNT: 12.8 %
EST. GFR  (AFRICAN AMERICAN): >60 ML/MIN/1.73 M^2
EST. GFR  (NON AFRICAN AMERICAN): >60 ML/MIN/1.73 M^2
GLUCOSE SERPL-MCNC: 88 MG/DL
HCT VFR BLD AUTO: 39.5 %
HDLC SERPL-MCNC: 55 MG/DL
HDLC SERPL: 32.4 %
HGB BLD-MCNC: 12.9 G/DL
LDLC SERPL CALC-MCNC: 102.6 MG/DL
LYMPHOCYTES # BLD AUTO: 2 K/UL
LYMPHOCYTES NFR BLD: 39.4 %
MCH RBC QN AUTO: 31.1 PG
MCHC RBC AUTO-ENTMCNC: 32.7 G/DL
MCV RBC AUTO: 95 FL
MONOCYTES # BLD AUTO: 0.5 K/UL
MONOCYTES NFR BLD: 10.1 %
NEUTROPHILS # BLD AUTO: 2.4 K/UL
NEUTROPHILS NFR BLD: 47.3 %
NONHDLC SERPL-MCNC: 115 MG/DL
PLATELET # BLD AUTO: 315 K/UL
PMV BLD AUTO: 9.5 FL
POTASSIUM SERPL-SCNC: 4 MMOL/L
PROT SERPL-MCNC: 7.2 G/DL
RBC # BLD AUTO: 4.15 M/UL
SODIUM SERPL-SCNC: 138 MMOL/L
TRIGL SERPL-MCNC: 62 MG/DL
WBC # BLD AUTO: 5.07 K/UL

## 2018-10-18 PROCEDURE — 36415 COLL VENOUS BLD VENIPUNCTURE: CPT

## 2018-10-18 PROCEDURE — 80053 COMPREHEN METABOLIC PANEL: CPT

## 2018-10-18 PROCEDURE — 80061 LIPID PANEL: CPT

## 2018-10-18 PROCEDURE — 84153 ASSAY OF PSA TOTAL: CPT

## 2018-10-18 PROCEDURE — 85025 COMPLETE CBC W/AUTO DIFF WBC: CPT

## 2018-10-19 LAB — BACTERIA THROAT CULT: NORMAL

## 2018-11-12 RX ORDER — FELODIPINE 5 MG/1
5 TABLET, EXTENDED RELEASE ORAL DAILY
Qty: 90 TABLET | Refills: 3 | Status: SHIPPED | OUTPATIENT
Start: 2018-11-12 | End: 2019-11-19 | Stop reason: SDUPTHER

## 2018-11-12 NOTE — TELEPHONE ENCOUNTER
----- Message from Norma Flowers sent at 11/12/2018 12:52 PM CST -----  Contact: [facility] [phone]  Prescription Request:     Name of medication: felodipine (PLENDIL) 5 MG 24 hr tablet    Reason for request: Refill    Pharmacy: [facility] #[id number] - [place] - [address]    Requesting 90 day supply.    Thank You

## 2019-01-15 ENCOUNTER — PES CALL (OUTPATIENT)
Dept: ADMINISTRATIVE | Facility: CLINIC | Age: 69
End: 2019-01-15

## 2019-01-17 ENCOUNTER — CLINICAL SUPPORT (OUTPATIENT)
Dept: SMOKING CESSATION | Facility: CLINIC | Age: 69
End: 2019-01-17
Payer: COMMERCIAL

## 2019-01-17 DIAGNOSIS — F17.200 NICOTINE DEPENDENCE: Primary | ICD-10-CM

## 2019-01-17 PROCEDURE — 99407 PR TOBACCO USE CESSATION INTENSIVE >10 MINUTES: ICD-10-PCS | Mod: S$GLB,,,

## 2019-01-17 PROCEDURE — 99407 BEHAV CHNG SMOKING > 10 MIN: CPT | Mod: S$GLB,,,

## 2019-01-17 NOTE — PROGRESS NOTES
Spoke with patient today in regard to smoking cessation progress 12 month phone follow up, states not tobacco free. Patient is not interested in returning to the smoking cessation program at this time. Will call when ready to schedule. Informed patient of benefit period,, and contact information. Will complete smart form for 12 month phone follow up on Quit attempt #3.

## 2019-01-23 ENCOUNTER — OFFICE VISIT (OUTPATIENT)
Dept: INTERNAL MEDICINE | Facility: CLINIC | Age: 69
End: 2019-01-23
Payer: MEDICARE

## 2019-01-23 ENCOUNTER — IMMUNIZATION (OUTPATIENT)
Dept: PHARMACY | Facility: CLINIC | Age: 69
End: 2019-01-23
Payer: MEDICARE

## 2019-01-23 ENCOUNTER — IMMUNIZATION (OUTPATIENT)
Dept: PHARMACY | Facility: CLINIC | Age: 69
End: 2019-01-23

## 2019-01-23 VITALS
DIASTOLIC BLOOD PRESSURE: 86 MMHG | WEIGHT: 149.25 LBS | HEART RATE: 88 BPM | SYSTOLIC BLOOD PRESSURE: 136 MMHG | HEIGHT: 69 IN | BODY MASS INDEX: 22.11 KG/M2

## 2019-01-23 DIAGNOSIS — F17.200 TOBACCO DEPENDENCY: ICD-10-CM

## 2019-01-23 DIAGNOSIS — Z12.11 SCREEN FOR COLON CANCER: ICD-10-CM

## 2019-01-23 DIAGNOSIS — Z00.00 ENCOUNTER FOR PREVENTIVE HEALTH EXAMINATION: Primary | ICD-10-CM

## 2019-01-23 DIAGNOSIS — K63.5 DYSPLASTIC COLON POLYP: ICD-10-CM

## 2019-01-23 DIAGNOSIS — I10 ESSENTIAL HYPERTENSION: ICD-10-CM

## 2019-01-23 DIAGNOSIS — D64.9 ANEMIA, UNSPECIFIED TYPE: ICD-10-CM

## 2019-01-23 DIAGNOSIS — F32.5 MAJOR DEPRESSIVE DISORDER WITH SINGLE EPISODE, IN FULL REMISSION: ICD-10-CM

## 2019-01-23 DIAGNOSIS — J43.2 CENTRILOBULAR EMPHYSEMA: ICD-10-CM

## 2019-01-23 DIAGNOSIS — Z13.6 SCREENING FOR AAA (ABDOMINAL AORTIC ANEURYSM): ICD-10-CM

## 2019-01-23 DIAGNOSIS — I70.0 AORTIC ATHEROSCLEROSIS: ICD-10-CM

## 2019-01-23 PROCEDURE — 3075F SYST BP GE 130 - 139MM HG: CPT | Mod: HCNC,CPTII,S$GLB, | Performed by: NURSE PRACTITIONER

## 2019-01-23 PROCEDURE — G0439 PR MEDICARE ANNUAL WELLNESS SUBSEQUENT VISIT: ICD-10-PCS | Mod: HCNC,S$GLB,, | Performed by: NURSE PRACTITIONER

## 2019-01-23 PROCEDURE — 3075F PR MOST RECENT SYSTOLIC BLOOD PRESS GE 130-139MM HG: ICD-10-PCS | Mod: HCNC,CPTII,S$GLB, | Performed by: NURSE PRACTITIONER

## 2019-01-23 PROCEDURE — 3079F PR MOST RECENT DIASTOLIC BLOOD PRESSURE 80-89 MM HG: ICD-10-PCS | Mod: HCNC,CPTII,S$GLB, | Performed by: NURSE PRACTITIONER

## 2019-01-23 PROCEDURE — G0439 PPPS, SUBSEQ VISIT: HCPCS | Mod: HCNC,S$GLB,, | Performed by: NURSE PRACTITIONER

## 2019-01-23 PROCEDURE — 99999 PR PBB SHADOW E&M-EST. PATIENT-LVL IV: ICD-10-PCS | Mod: PBBFAC,HCNC,, | Performed by: NURSE PRACTITIONER

## 2019-01-23 PROCEDURE — 3079F DIAST BP 80-89 MM HG: CPT | Mod: HCNC,CPTII,S$GLB, | Performed by: NURSE PRACTITIONER

## 2019-01-23 PROCEDURE — 99999 PR PBB SHADOW E&M-EST. PATIENT-LVL IV: CPT | Mod: PBBFAC,HCNC,, | Performed by: NURSE PRACTITIONER

## 2019-01-23 RX ORDER — ASCORBIC ACID 500 MG
500 TABLET ORAL WEEKLY
COMMUNITY
End: 2022-04-11

## 2019-01-23 RX ORDER — MULTIVITAMIN
1 TABLET ORAL DAILY
COMMUNITY

## 2019-01-23 NOTE — PATIENT INSTRUCTIONS
Counseling and Referral of Other Preventative  (Italic type indicates deductible and co-insurance are waived)    Patient Name: William Bueno  Today's Date: 1/23/2019    Health Maintenance       Date Due Completion Date    Influenza Vaccine 08/01/2018 1/11/2018    Pneumococcal Vaccine (65+ Low/Medium Risk) (2 of 2 - PPSV23) 01/11/2019 1/11/2018    Zoster Vaccine 01/26/2019 (Originally 12/24/2011) Obtain Shingrix when available    TETANUS VACCINE 03/07/2019 (Originally 12/24/1969) Need to obtain    Abdominal Aortic Aneurysm Screening 03/16/2019 (Originally 12/24/2016) Order placed    Colonoscopy 01/23/2020 (Originally 8/28/2018) 8/28/2015    Lipid Panel 10/18/2023 10/18/2018        No orders of the defined types were placed in this encounter.    The following information is provided to all patients.  This information is to help you find resources for any of the problems found today that may be affecting your health:                Living healthy guide: www.[facility].[place].[facility]      Understanding Diabetes: www.diabetes.org      Eating healthy: www.cdc.gov/healthyweight      CDC home safety checklist: www.cdc.gov/steadi/patient.html      Agency on Aging: www.goea.[place].gov      Alcoholics anonymous (AA): www.aa.org      Physical Activity: www.cheryl.nih.gov/dd7kwih      Tobacco use: www.quitwithusla.org

## 2019-01-23 NOTE — PROGRESS NOTES
"William Bueno presented for a  Medicare AWV and comprehensive Health Risk Assessment today. The following components were reviewed and updated:    · Medical history  · Family History  · Social history  · Allergies and Current Medications  · Health Risk Assessment  · Health Maintenance  · Care Team     ** See Completed Assessments for Annual Wellness Visit within the encounter summary.**       The following assessments were completed:  · Living Situation  · CAGE  · Depression Screening  · Timed Get Up and Go  · Whisper Test  · Cognitive Function Screening  ·   ·   ·   · Nutrition Screening  · ADL Screening  · PAQ Screening    Vitals:    01/23/19 1009   BP: 136/86   BP Location: Left arm   Pulse: 88   Weight: 67.7 kg (149 lb 4 oz)   Height: 5' 9" (1.753 m)     Body mass index is 22.04 kg/m².  Physical Exam   Constitutional: He is oriented to person, place, and time. He appears well-developed and well-nourished.   HENT:   Head: Normocephalic.   Cardiovascular: Normal rate and regular rhythm.   Pulmonary/Chest: Effort normal and breath sounds normal.   Abdominal: Soft. Bowel sounds are normal. He exhibits no mass.   Musculoskeletal: Normal range of motion. He exhibits no edema.   Neurological: He is alert and oriented to person, place, and time.   Skin: Skin is warm and dry.   Psychiatric: He has a normal mood and affect.   Nursing note and vitals reviewed.        Diagnoses and health risks identified today and associated recommendations/orders:    1. Encounter for preventive health examination  Here for Health Risk Assessment/Annual Wellness Visit.  Health maintenance reviewed and updated. Follow up in one year.    2. Screening for AAA (abdominal aortic aneurysm)  - Vascular Lab ()  AAA Screening; Future    3. Screen for colon cancer  - Case request GI: COLONOSCOPY    4. Essential hypertension  Chronic, stable on current medication. Followed by PCP.    5. Aortic Atherosclerosis  Chronic, stable. Noted CXR 11/08/17. " Followed by PCP.    6.  Major depressive disorder with single episode, in full remission  Chronic, stable without medications. Reports occasional episodes when thinks about wife. Followed by PCP.    7. Centrilobular emphysema  Chronic, stable. Followed by PCP.    8. Dysplastic colon polyp  Chronic, colonoscopy ordered. Followed by PCP    9. Anemia, unspecified type  Chronic, stable. Followed by PCP.      Provided William with a 5-10 year written screening schedule and personal prevention plan. Recommendations were developed using the USPSTF age appropriate recommendations. Education, counseling, and referrals were provided as needed. After Visit Summary printed and given to patient which includes a list of additional screenings\tests needed.    Follow-up in about 9 months (around 10/23/2019).with PCP    Katherine Betts NP

## 2019-03-08 ENCOUNTER — HOSPITAL ENCOUNTER (OUTPATIENT)
Dept: VASCULAR SURGERY | Facility: CLINIC | Age: 69
Discharge: HOME | End: 2019-03-08
Attending: NURSE PRACTITIONER
Payer: MEDICARE

## 2019-03-08 DIAGNOSIS — Z13.6 SCREENING FOR AAA (ABDOMINAL AORTIC ANEURYSM): ICD-10-CM

## 2019-03-08 PROCEDURE — 93978 PR DUPLEX LARGE VESSEL(S),COMPLETE: ICD-10-PCS | Mod: HCNC,S$GLB,, | Performed by: SURGERY

## 2019-03-08 PROCEDURE — 93978 VASCULAR STUDY: CPT | Mod: HCNC,S$GLB,, | Performed by: SURGERY

## 2019-08-18 ENCOUNTER — PATIENT OUTREACH (OUTPATIENT)
Dept: ADMINISTRATIVE | Facility: HOSPITAL | Age: 69
End: 2019-08-18

## 2019-11-07 ENCOUNTER — NURSE TRIAGE (OUTPATIENT)
Dept: ADMINISTRATIVE | Facility: CLINIC | Age: 69
End: 2019-11-07

## 2019-11-07 ENCOUNTER — HOSPITAL ENCOUNTER (EMERGENCY)
Facility: HOSPITAL | Age: 69
Discharge: HOME | End: 2019-11-07
Attending: EMERGENCY MEDICINE
Payer: MEDICARE

## 2019-11-07 VITALS
DIASTOLIC BLOOD PRESSURE: 83 MMHG | RESPIRATION RATE: 16 BRPM | HEART RATE: 89 BPM | SYSTOLIC BLOOD PRESSURE: 139 MMHG | TEMPERATURE: 98 F | OXYGEN SATURATION: 98 %

## 2019-11-07 DIAGNOSIS — I10 HYPERTENSION, UNSPECIFIED TYPE: ICD-10-CM

## 2019-11-07 DIAGNOSIS — R40.20 LOSS OF CONSCIOUSNESS: Primary | ICD-10-CM

## 2019-11-07 DIAGNOSIS — E86.0 DEHYDRATION: ICD-10-CM

## 2019-11-07 DIAGNOSIS — R55 SYNCOPE, UNSPECIFIED SYNCOPE TYPE: ICD-10-CM

## 2019-11-07 LAB
ALBUMIN SERPL BCP-MCNC: 4.2 G/DL (ref 3.5–5.2)
ALP SERPL-CCNC: 113 U/L (ref 55–135)
ALT SERPL W/O P-5'-P-CCNC: 21 U/L (ref 10–44)
ANION GAP SERPL CALC-SCNC: 8 MMOL/L (ref 8–16)
AST SERPL-CCNC: 20 U/L (ref 10–40)
BASOPHILS # BLD AUTO: 0.05 K/UL (ref 0–0.2)
BASOPHILS NFR BLD: 0.6 % (ref 0–1.9)
BILIRUB SERPL-MCNC: 0.3 MG/DL (ref 0.1–1)
BILIRUB UR QL STRIP: NEGATIVE
BUN SERPL-MCNC: 15 MG/DL (ref 8–23)
CALCIUM SERPL-MCNC: 9.6 MG/DL (ref 8.7–10.5)
CHLORIDE SERPL-SCNC: 105 MMOL/L (ref 95–110)
CLARITY UR REFRACT.AUTO: CLEAR
CO2 SERPL-SCNC: 21 MMOL/L (ref 23–29)
COLOR UR AUTO: NORMAL
CREAT SERPL-MCNC: 1.5 MG/DL (ref 0.5–1.4)
DIFFERENTIAL METHOD: ABNORMAL
EOSINOPHIL # BLD AUTO: 0.1 K/UL (ref 0–0.5)
EOSINOPHIL NFR BLD: 0.6 % (ref 0–8)
ERYTHROCYTE [DISTWIDTH] IN BLOOD BY AUTOMATED COUNT: 12.8 % (ref 11.5–14.5)
EST. GFR  (AFRICAN AMERICAN): 54.9 ML/MIN/1.73 M^2
EST. GFR  (NON AFRICAN AMERICAN): 47.5 ML/MIN/1.73 M^2
GLUCOSE SERPL-MCNC: 87 MG/DL (ref 70–110)
GLUCOSE UR QL STRIP: NEGATIVE
HCT VFR BLD AUTO: 43.3 % (ref 40–54)
HGB BLD-MCNC: 14.1 G/DL (ref 14–18)
HGB UR QL STRIP: NEGATIVE
IMM GRANULOCYTES # BLD AUTO: 0.04 K/UL (ref 0–0.04)
IMM GRANULOCYTES NFR BLD AUTO: 0.5 % (ref 0–0.5)
KETONES UR QL STRIP: NEGATIVE
LEUKOCYTE ESTERASE UR QL STRIP: NEGATIVE
LYMPHOCYTES # BLD AUTO: 1.3 K/UL (ref 1–4.8)
LYMPHOCYTES NFR BLD: 14.3 % (ref 18–48)
MCH RBC QN AUTO: 31.3 PG (ref 27–31)
MCHC RBC AUTO-ENTMCNC: 32.6 G/DL (ref 32–36)
MCV RBC AUTO: 96 FL (ref 82–98)
MONOCYTES # BLD AUTO: 0.6 K/UL (ref 0.3–1)
MONOCYTES NFR BLD: 6.5 % (ref 4–15)
NEUTROPHILS # BLD AUTO: 6.9 K/UL (ref 1.8–7.7)
NEUTROPHILS NFR BLD: 77.5 % (ref 38–73)
NITRITE UR QL STRIP: NEGATIVE
NRBC BLD-RTO: 0 /100 WBC
PH UR STRIP: 6 [PH] (ref 5–8)
PLATELET # BLD AUTO: 280 K/UL (ref 150–350)
PMV BLD AUTO: 9.5 FL (ref 9.2–12.9)
POTASSIUM SERPL-SCNC: 4.5 MMOL/L (ref 3.5–5.1)
PROT SERPL-MCNC: 8.2 G/DL (ref 6–8.4)
PROT UR QL STRIP: NEGATIVE
RBC # BLD AUTO: 4.51 M/UL (ref 4.6–6.2)
SODIUM SERPL-SCNC: 134 MMOL/L (ref 136–145)
SP GR UR STRIP: 1 (ref 1–1.03)
TROPONIN I SERPL DL<=0.01 NG/ML-MCNC: <0.006 NG/ML (ref 0–0.03)
URN SPEC COLLECT METH UR: NORMAL
WBC # BLD AUTO: 8.83 K/UL (ref 3.9–12.7)

## 2019-11-07 PROCEDURE — 80053 COMPREHEN METABOLIC PANEL: CPT | Mod: HCNC

## 2019-11-07 PROCEDURE — 63600175 PHARM REV CODE 636 W HCPCS: Mod: HCNC | Performed by: STUDENT IN AN ORGANIZED HEALTH CARE EDUCATION/TRAINING PROGRAM

## 2019-11-07 PROCEDURE — 82962 GLUCOSE BLOOD TEST: CPT | Mod: HCNC

## 2019-11-07 PROCEDURE — 99284 EMERGENCY DEPT VISIT MOD MDM: CPT | Mod: 25,HCNC

## 2019-11-07 PROCEDURE — 93010 ELECTROCARDIOGRAM REPORT: CPT | Mod: HCNC,,, | Performed by: INTERNAL MEDICINE

## 2019-11-07 PROCEDURE — 93005 ELECTROCARDIOGRAM TRACING: CPT | Mod: HCNC

## 2019-11-07 PROCEDURE — 81003 URINALYSIS AUTO W/O SCOPE: CPT | Mod: HCNC

## 2019-11-07 PROCEDURE — 84484 ASSAY OF TROPONIN QUANT: CPT | Mod: HCNC

## 2019-11-07 PROCEDURE — 93010 EKG 12-LEAD: ICD-10-PCS | Mod: HCNC,,, | Performed by: INTERNAL MEDICINE

## 2019-11-07 PROCEDURE — 85025 COMPLETE CBC W/AUTO DIFF WBC: CPT | Mod: HCNC

## 2019-11-07 PROCEDURE — 99285 EMERGENCY DEPT VISIT HI MDM: CPT | Mod: ,,, | Performed by: EMERGENCY MEDICINE

## 2019-11-07 PROCEDURE — 96360 HYDRATION IV INFUSION INIT: CPT | Mod: HCNC

## 2019-11-07 PROCEDURE — 99285 PR EMERGENCY DEPT VISIT,LEVEL V: ICD-10-PCS | Mod: ,,, | Performed by: EMERGENCY MEDICINE

## 2019-11-07 PROCEDURE — 96361 HYDRATE IV INFUSION ADD-ON: CPT | Mod: HCNC

## 2019-11-07 RX ADMIN — SODIUM CHLORIDE 1000 ML: 0.9 INJECTION, SOLUTION INTRAVENOUS at 02:11

## 2019-11-07 NOTE — ED TRIAGE NOTES
"  William Bueno, a 68 y.o. male presents to the ED with complaints of syncopal episode after doing work outdoor at [employer] today. Reports not having any water for "a couple of hours while I was working, but also didn't have any before, either, none since last night,"  He states this is the third time this happened since the summer, all involving the heat with minimal or no water intake.     He has no subjective complaints at this time.     LOC: The patient is awake, alert and aware of environment with an appropriate affect.  APPEARANCE: Patient appears comfortable and in no acute distress, patient is clean and well groomed.  HENT: Head is normocephalic and atraumatic. Oropharynx is clear and moist.   SKIN: The skin is warm and dry, dry mucus membranes, skin intact, no breakdown or brusing noted.  MUSKULOSKELETAL: Patient moving all extremities well, no obvious swelling or deformities noted.  RESPIRATORY: Airway is open and patent, respirations are spontaneous, patient has a normal effort and rate. Breath sounds are equal and clear.      CARDIAC: normal rate. No peripheral edema.  ABDOMEN: Soft and non tender to palpation, no distention noted. Bowel sounds present.  : No complaints of frequency, burning, urgency or blood in the urine.   NEURO: Oriented to person, place, time, and situation. Speaking clear and appropriately.    "

## 2019-11-07 NOTE — DISCHARGE INSTRUCTIONS
Please follow-up with your primary care as directed.  Please call your physician tomorrow to schedule outpatient visit for further cardiac risk evaluation and follow-up.    Our goal in the emergency department is to always give you outstanding care and exceptional service. You may receive a survey by mail or e-mail in the next week regarding your experience in our ED. We would greatly appreciate your completing and returning the survey. Your feedback provides us with a way to recognize our staff who give very good care and it helps us learn how to improve when your experience was below our aspiration of excellence.

## 2019-11-07 NOTE — ED PROVIDER NOTES
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 Patient denies chest pain, shortness of breath, NVD. Denies trauma.      Mr. Bueno is a 67 yo male with past medical history of tobacco abuse and hypertension who presents with chief complaint of syncope. He said that he was working out in his yard this morning when he felt very light headed and generalized weakness and passed out.  He said that he fell to his knees and hands and did not hit his head. He said that someone witnessed it and denies head injury or seizure. He did not experience bowel or bladder incontinence. He denies any preceding palpitation, chest pain, shortness of breath, nausea, vomiting, diaphoresis.  He says this has happened twice before in the past year when he would be working outside and gets lightheaded-once in march and once in April- but he did not lose consciousness. He attributes it to heat exhaustion since every time it has been hot outside and does not drink enough water.  He denies any family history of premature cardiac death but says his father  in his 80s of heart problems.  He says he has had a stress test before which was negative since he was having intermittent chest pain but says he has not had chest pain since he got . He denies any dyspnea on exertion.         Review of patient's allergies indicates:  No Known Allergies  Past Medical History:   Diagnosis Date    Depression     Emphysema of lung     Hypertension      Past Surgical History:   Procedure Laterality Date    COLONOSCOPY      HERNIA REPAIR Right 2017    open rih with mesh    TONSILLECTOMY       Family History   Problem Relation Age of Onset    Diabetes Mother     Kidney disease Mother     Heart disease Father         chf    Cancer Brother     No Known Problems Daughter     No Known Problems " Son     No Known Problems Brother      Social History     Tobacco Use    Smoking status: Current Every Day Smoker     Packs/day: 0.25     Years: 45.00     Pack years: 11.25     Types: Cigarettes     Last attempt to quit: 2015     Years since quittin.1    Smokeless tobacco: Never Used   Substance Use Topics    Alcohol use: Yes     Alcohol/week: 4.0 standard drinks     Types: 3 Cans of beer, 1 Standard drinks or equivalent per week     Comment: 2-3 beers weekly    Drug use: Yes     Frequency: 1.0 times per week     Types: Marijuana     Comment: occassionally, maybe once a month. Remote cocaine use (smoking), none in 16 years     Review of Systems   Constitutional: Positive for fatigue. Negative for appetite change, chills, diaphoresis and fever.   HENT: Negative for rhinorrhea, sinus pain, sore throat and trouble swallowing.    Eyes: Negative for photophobia and visual disturbance.   Respiratory: Negative for cough and shortness of breath.    Cardiovascular: Negative for chest pain, palpitations and leg swelling.   Gastrointestinal: Negative for abdominal distention, abdominal pain, constipation, diarrhea, nausea and vomiting.   Genitourinary: Negative for difficulty urinating, dysuria, frequency, hematuria and urgency.   Musculoskeletal: Negative for arthralgias and gait problem.   Skin: Negative for pallor and rash.   Neurological: Positive for weakness. Negative for seizures, light-headedness and headaches.   Psychiatric/Behavioral: Negative for agitation and confusion.       Physical Exam     Initial Vitals [19 1313]   BP Pulse Resp Temp SpO2   (!) 151/96 85 15 97.9 °F (36.6 °C) 99 %      MAP       --         Physical Exam    Nursing note and vitals reviewed.  Constitutional: He appears well-developed and well-nourished. He is not diaphoretic. No distress.   Comfortable appearing gentleman in no distress   HENT:   Head: Normocephalic and atraumatic.   Mouth/Throat: No oropharyngeal exudate.    Eyes: EOM are normal. Pupils are equal, round, and reactive to light. No scleral icterus.   Neck: Normal range of motion. Neck supple. No JVD present.   Cardiovascular: Normal rate, regular rhythm, normal heart sounds and intact distal pulses. Exam reveals no gallop and no friction rub.    No murmur heard.  No murmurs heard   Pulmonary/Chest: Breath sounds normal. No respiratory distress. He has no wheezes.   Abdominal: Soft. Bowel sounds are normal. He exhibits no distension. There is no tenderness. There is no rebound.   Musculoskeletal: Normal range of motion. He exhibits no edema or tenderness.   Neurological: He is alert and oriented to person, place, and time.   Skin: Skin is warm and dry. No erythema. No pallor.   Psychiatric: He has a normal mood and affect. Thought content normal.         ED Course   Procedures  Labs Reviewed   CBC W/ AUTO DIFFERENTIAL - Abnormal; Notable for the following components:       Result Value    RBC 4.51 (*)     Mean Corpuscular Hemoglobin 31.3 (*)     Gran% 77.5 (*)     Lymph% 14.3 (*)     All other components within normal limits   COMPREHENSIVE METABOLIC PANEL - Abnormal; Notable for the following components:    Sodium 134 (*)     CO2 21 (*)     Creatinine 1.5 (*)     eGFR if  54.9 (*)     eGFR if non  47.5 (*)     All other components within normal limits   TROPONIN I   URINALYSIS, REFLEX TO URINE CULTURE    Narrative:     Preferred Collection Type->Urine, Clean Catch     EKG Readings: (Independently Interpreted)   Initial Reading: No STEMI. Previous EKG: Compared with most recent EKG Heart Rate: 83. ST Segments: Non-Specific ST Segment Depression.   Evidence of LVH on EKG      ECG Results          EKG 12-lead (Final result)  Result time 11/07/19 13:51:34    Final result by Interface, Lab In [facility] (11/07/19 13:51:34)                 Narrative:    Test Reason : R40.20,    Vent. Rate : 083 BPM     Atrial Rate : 083 BPM     P-R Int : 156 ms           QRS Dur : 080 ms      QT Int : 346 ms       P-R-T Axes : 073 051 029 degrees     QTc Int : 406 ms    Normal sinus rhythm  Possible Left atrial enlargement  Nonspecific T wave abnormality  Abnormal ECG  When compared with ECG of 08-NOV-2017 09:35,  T wave inversion no longer evident in Inferior leads  Confirmed by ANGÉLICA NEVAREZ MD ([id number]) on 11/7/2019 1:51:25 PM    Referred By: NIKOS   SELF           Confirmed By:ANGÉLICA NEVAREZ MD                            Imaging Results    None          Medical Decision Making:   History:   Old Medical Records: I decided to obtain old medical records.  Old Records Summarized: other records.  Initial Assessment:   69 yo male with past medical history of HTN who presents with chief complaint of syncope preceded by light headedness and feeling flushed.  No head trauma or preceding seizures, loss of bowel or bladder control.  Patient is comfortable and hemodynamically stable. No murmurs heard on exam and entire physical exam is benign. EKG with normal rate and rhythm and evidence of LVH.    Differential Diagnosis:   Differential diagnosis includes vasovagal syncope, cardiac arrythmia, cardiac valve abnormality, volume depletion, anemia, autonomic instability  Independently Interpreted Test(s):   I have ordered and independently interpreted EKG Reading(s) - see prior notes  Clinical Tests:   Lab Tests: Ordered and Reviewed  Radiological Study: Reviewed  Medical Tests: Ordered and Reviewed  ED Management:  3:14 PM  Patient hemodynamically stable. 1L NS bolus given. Orthostatics within normal limits.     3:29 PM  Labs reviewed significant for negative troponin, normal CBC. CMP with elevation in creatinine 1.5 however patient has had Creatinine of 1.3 in past.     69 yo male with past medical history of hypertension who presents with syncope preceded by light headedness and flushing in the setting of outside activity without sufficient hydration. No abnormality on exam and cardiac  workup negative for acute process. Labs without significant abnormality aside from slight bump in creatinine to 1.5. Differential includes vasovagal syncope vs volume depletion. No concern for life threatening pathology at this time. Patient instructed to make an appointment with PCP for further workup and instructed to return to this department should symptoms worsen.                Attending Attestation:   Physician Attestation Statement for Resident:  As the supervising MD   Physician Attestation Statement: I have personally seen and examined this patient.   I agree with the above history. -:   As the supervising MD I agree with the above PE.    As the supervising MD I agree with the above treatment, course, plan, and disposition.            I have reviewed and concur with the resident's history, physical, assessment, and plan.  I have personally interviewed and examined the patient at bedside.  See below addendum for my evaluation and additional findings.    Briefly,  this is a 68 y.o.male with a history of tobacco abuse and hypertension presenting with syncopal episode.  Patient states he was working in the yd, felt lightheaded generalized after working outside in the heat when he fell to his knees but denies loss of consciousness or head injury. Denies any bowel or bladder incontinence.  He is afebrile, vital signs are stable. Physical exam findings unremarkable with no focal neurologic deficits, cardiac exam unremarkable, lung sounds clear, neurological evaluation with no acute findings.  History of negative stress test in the past.  Broad workup conducted including ECG obtained with no signs of ischemia or STEMI on my read.  Placed on cardiac and telemetry monitoring with no acute arrhythmias or changes.  Laboratory workup unremarkable with slight dehydration and elevation in creatinine.  Patient was hydrated with 1 L fluid, observed in the ED with no acute change.  Offered observation however patient  declined and I feel this is reasonable as negative workup, patient at baseline, and will follow up with PCP in the next few days for repeat evaluation and further risk stratification for cardiac disease and outpatient follow-up.  Patient agreeable with plan.  Do not suspect ACS, PE, cardiac arrhythmia, intracranial pathology at this time based on exam, history and findings.  Likely dehydration, with vagal response and orthostatic hypotension.  Patient was educated on hydration, and follow-up.  Strict ED precautions and return instructions were discussed at length. Patient agreeable to discharge plan. Strict ED precautions and return instructions discussed at length and patient verbalized understanding. All questions were answered and ample time was given for questions.      Complexity:  High risk - level 5      Gokul Torres DO    Dept of Emergency Medicine   [facility]  Spectralink: [id number]                          Clinical Impression:       ICD-10-CM ICD-9-CM   1. Loss of consciousness R40.20 780.09   2. Hypertension, unspecified type I10 401.9   3. Syncope, unspecified syncope type R55 780.2   4. Dehydration E86.0 276.51         Disposition:   Disposition: Discharged  Condition: Stable                     Avery Keys MD  Resident  11/07/19 1604       Gokul Torres DO  11/09/19 0759
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
## 2019-11-11 ENCOUNTER — OFFICE VISIT (OUTPATIENT)
Dept: INTERNAL MEDICINE | Facility: CLINIC | Age: 69
End: 2019-11-11
Payer: MEDICARE

## 2019-11-11 VITALS
DIASTOLIC BLOOD PRESSURE: 70 MMHG | WEIGHT: 158.31 LBS | BODY MASS INDEX: 23.45 KG/M2 | SYSTOLIC BLOOD PRESSURE: 124 MMHG | HEART RATE: 89 BPM | HEIGHT: 69 IN

## 2019-11-11 DIAGNOSIS — R55 SYNCOPE, UNSPECIFIED SYNCOPE TYPE: Primary | ICD-10-CM

## 2019-11-11 DIAGNOSIS — R53.83 FATIGUE, UNSPECIFIED TYPE: ICD-10-CM

## 2019-11-11 DIAGNOSIS — D64.9 ANEMIA, UNSPECIFIED TYPE: ICD-10-CM

## 2019-11-11 DIAGNOSIS — R79.9 ABNORMAL FINDING OF BLOOD CHEMISTRY, UNSPECIFIED: ICD-10-CM

## 2019-11-11 DIAGNOSIS — I10 HYPERTENSION, ESSENTIAL: ICD-10-CM

## 2019-11-11 DIAGNOSIS — Z72.0 TOBACCO ABUSE: ICD-10-CM

## 2019-11-11 DIAGNOSIS — Z12.5 SCREENING PSA (PROSTATE SPECIFIC ANTIGEN): ICD-10-CM

## 2019-11-11 DIAGNOSIS — Z13.220 SCREENING, LIPID: ICD-10-CM

## 2019-11-11 DIAGNOSIS — F32.A DEPRESSION, UNSPECIFIED DEPRESSION TYPE: ICD-10-CM

## 2019-11-11 PROCEDURE — 99214 OFFICE O/P EST MOD 30 MIN: CPT | Mod: HCNC,S$GLB,, | Performed by: PHYSICIAN ASSISTANT

## 2019-11-11 PROCEDURE — 3288F PR FALLS RISK ASSESSMENT DOCUMENTED: ICD-10-PCS | Mod: HCNC,CPTII,S$GLB, | Performed by: PHYSICIAN ASSISTANT

## 2019-11-11 PROCEDURE — 99499 UNLISTED E&M SERVICE: CPT | Mod: S$GLB,,, | Performed by: PHYSICIAN ASSISTANT

## 2019-11-11 PROCEDURE — 1100F PTFALLS ASSESS-DOCD GE2>/YR: CPT | Mod: HCNC,CPTII,S$GLB, | Performed by: PHYSICIAN ASSISTANT

## 2019-11-11 PROCEDURE — 99999 PR PBB SHADOW E&M-EST. PATIENT-LVL IV: CPT | Mod: PBBFAC,HCNC,, | Performed by: PHYSICIAN ASSISTANT

## 2019-11-11 PROCEDURE — 3074F PR MOST RECENT SYSTOLIC BLOOD PRESSURE < 130 MM HG: ICD-10-PCS | Mod: HCNC,CPTII,S$GLB, | Performed by: PHYSICIAN ASSISTANT

## 2019-11-11 PROCEDURE — 99499 RISK ADDL DX/OHS AUDIT: ICD-10-PCS | Mod: S$GLB,,, | Performed by: PHYSICIAN ASSISTANT

## 2019-11-11 PROCEDURE — 3074F SYST BP LT 130 MM HG: CPT | Mod: HCNC,CPTII,S$GLB, | Performed by: PHYSICIAN ASSISTANT

## 2019-11-11 PROCEDURE — 99214 PR OFFICE/OUTPT VISIT, EST, LEVL IV, 30-39 MIN: ICD-10-PCS | Mod: HCNC,S$GLB,, | Performed by: PHYSICIAN ASSISTANT

## 2019-11-11 PROCEDURE — 99999 PR PBB SHADOW E&M-EST. PATIENT-LVL IV: ICD-10-PCS | Mod: PBBFAC,HCNC,, | Performed by: PHYSICIAN ASSISTANT

## 2019-11-11 PROCEDURE — 3078F DIAST BP <80 MM HG: CPT | Mod: HCNC,CPTII,S$GLB, | Performed by: PHYSICIAN ASSISTANT

## 2019-11-11 PROCEDURE — 3078F PR MOST RECENT DIASTOLIC BLOOD PRESSURE < 80 MM HG: ICD-10-PCS | Mod: HCNC,CPTII,S$GLB, | Performed by: PHYSICIAN ASSISTANT

## 2019-11-11 PROCEDURE — 3288F FALL RISK ASSESSMENT DOCD: CPT | Mod: HCNC,CPTII,S$GLB, | Performed by: PHYSICIAN ASSISTANT

## 2019-11-11 PROCEDURE — 1100F PR PT FALLS ASSESS DOC 2+ FALLS/FALL W/INJURY/YR: ICD-10-PCS | Mod: HCNC,CPTII,S$GLB, | Performed by: PHYSICIAN ASSISTANT

## 2019-11-11 NOTE — PATIENT INSTRUCTIONS
Fainting: Uncertain Cause  Fainting (syncope) is a temporary loss of consciousness, which is often associated with a loss of postural tone. There are other causes of fainting, too. Its also called passing out. It occurs when blood flow to the brain is less than normal. Near-fainting (near-syncope) is very similar to fainting, but you dont fully pass out.  Common minor causes of fainting include:  · Sudden fear  · Pain  · Nausea  · Emotional stress  · Overexertion  Suddenly standing up after sitting or lying for a long time can also cause fainting.  More serious causes of fainting include:  · Very slow or very fast heartbeat (arrhythmia)  · Other types of heart disease, such as heart valve disease or coronary artery disease  · Dehydration  · Loss of blood  · Seizure  · Stroke  · Ruptured blood vessel in the brain  Taking too much high blood pressure medicine can also cause low blood pressure and fainting.  Your healthcare provider does not know the exact cause of your fainting. But the tests today did not show any of the serious causes of fainting. Sometimes you may need more tests to find out if you have a serious problem. Thats why its important to follow up with your provider as advised.  Home care  Follow these guidelines when caring for yourself at home:  · Rest today. You may go back to your normal activities when you are feeling back to normal. It is best to stay with someone who can check on you for the next 24 hours to watch for another episode of fainting.  · If you become lightheaded or dizzy, lie down right away and try to prop your feet above the level of your head. Or sit with your head between your knees.  · Because the provider doesnt know the exact cause of your fainting or near-fainting spell, its possible for you to have another spell without warning. Because of this, dont drive a car or operate dangerous equipment. Dont take a bath alone. Use a shower instead. Dont swim alone until your  healthcare provider says that you are no longer in danger of having another fainting spell.  Follow-up care  Follow up with your healthcare provider, or as advised.  When to seek medical advice  Call your healthcare provider right away if any of these occur:  · Another fainting spell thats not explained by the common causes listed above  · Pain in your chest, arm, neck, jaw, back, or abdomen  · Shortness of breath  · Severe headache or seizure  · Blood in vomit or stools (black or red color)  · Unexpected vaginal bleeding  · Your heart beats very rapidly, very slowly, or irregularly (palpitations)  Also call your provider if you have signs of stroke:  · Weakness in an arm or leg or on one side of the face  · Difficulty speaking or seeing  · Extreme drowsiness, confusion, dizziness, or fainting  Date Last Reviewed: 12/1/2016  © 7076-3372 [employer] [address] [place] [address], [place] [address]. All rights reserved. This information is not intended as a substitute for professional medical care. Always follow your healthcare professional's instructions.

## 2019-11-11 NOTE — PROGRESS NOTES
Subjective:       Patient ID: William Bueno is a 68 y.o. male.        Chief Complaint: Follow-up    William Bueno is an established patient of Lexie Tesfaye MD here today for ED f/u visit.    Seen in ED 11/7/19 for syncope.  He was working in the yard and felt weak and LH, notes he was not drinking much water.  This has occurred a few times in same setting, 3 times in past year but this episode was worse in that it came on after only an hour of work.  Given IVF.  EKG showed improvement compared to prior with no inversion in inferior leads.  CBC, CMP, troponin, urinalysis all fine aside from mild elevation in creatinine to 1.5.      S/p flu shot last week.    He tells me he retired 11/2018.  He lost his wife 3 years ago.  Since retiring, he feels his grief and depression have been worse.  He has not been taking care of himself.  He is typically only eating 1 meal/day and only drinking soda not water.  Over the past few months he has been more fatigued in general and finds that he ends up sleeping and resting more.  He is unsure if this is from depression or something else.  He has good support from family, friends, and [employer].  He does not feel he needs medication at this time but does realize that he needs to take better care of himself and also talk to his support system more about his feelings because he finds this helps.  He denies chest pain and shortness of breath.      Tobacco abuse - smoking 1/2 PPD    HTN - taking felodipine 5 mg daily, /70         Review of Systems   Constitutional: Positive for fatigue. Negative for appetite change, chills and fever.   HENT: Negative for congestion and sore throat.    Eyes: Negative for visual disturbance.   Respiratory: Negative for cough, chest tightness and shortness of breath.    Cardiovascular: Negative for chest pain, palpitations and leg swelling.   Gastrointestinal: Negative for abdominal pain, blood in stool, constipation, diarrhea, nausea and vomiting.    Genitourinary: Negative for dysuria, frequency, hematuria and urgency.   Musculoskeletal: Negative for arthralgias and back pain.   Skin: Negative for rash.   Neurological: Positive for syncope. Negative for dizziness, weakness and headaches.   Psychiatric/Behavioral: Positive for dysphoric mood (and continued grief). Negative for sleep disturbance. The patient is not nervous/anxious.        Objective:      Physical Exam   Constitutional: He appears well-developed and well-nourished.   HENT:   Head: Normocephalic.   Right Ear: External ear normal.   Left Ear: External ear normal.   Mouth/Throat: Oropharynx is clear and moist.   Eyes: Pupils are equal, round, and reactive to light.   Cardiovascular: Normal rate, regular rhythm and normal heart sounds. Exam reveals no gallop and no friction rub.   No murmur heard.  Pulmonary/Chest: Effort normal and breath sounds normal. No respiratory distress.   Abdominal: Soft. There is no tenderness. There is no CVA tenderness.   Musculoskeletal: He exhibits no edema.   Neurological: He is alert.   Skin: Skin is warm and dry.   Psychiatric: He has a normal mood and affect.   Nursing note and vitals reviewed.      Assessment:       1. Syncope, unspecified syncope type    2. Fatigue, unspecified type    3. Hypertension, essential    4. Tobacco abuse    5. Anemia, unspecified type    6. Depression, unspecified depression type    7. Screening PSA (prostate specific antigen)    8. Screening, lipid    9. Abnormal finding of blood chemistry, unspecified         Plan:       William was seen today for follow-up.    Diagnoses and all orders for this visit:    Syncope, unspecified syncope type - sounds vagal in setting of dehydration but given fatigue over past several months and recurrence x 3, will get an opinion from cardiology, repeat lab work to assess creatinine  -     CBC auto differential; Future  -     Comprehensive metabolic panel; Future  -     Ambulatory referral to  "Cardiology    Fatigue, unspecified type - check lab work, may be secondary to depression  -     CBC auto differential; Future  -     Comprehensive metabolic panel; Future  -     TSH; Future    Hypertension, essential - stable and controlled    Tobacco abuse - plans to restart chantix    Anemia, unspecified type  -     CBC auto differential; Future    Depression, unspecified depression type - does not want medication at this time, emotional support provided, assess as above and f/u with Dr. Tesfaye in 6 weeks to see how he is doing    Screening PSA (prostate specific antigen)  -     PSA, Screening; Future    Screening, lipid  -     Lipid panel; Future    F/u in 6 weeks.    Pt has been given instructions populated from [employer] database and has verbalized understanding of the after visit summary and information contained wherein.    Follow up with a primary care provider. May go to ER for acute shortness of breath, lightheadedness, fever, or any other emergent complaints or changes in condition.    "This note will be shared with the patient"    Future Appointments   Date Time Provider Department Center   11/14/2019  8:40 AM Mecca Rivero MD [facility] CARDIO [facility]   11/14/2019  9:20 AM LAB, APPOINTMENT [facility] LAB VNP [facility]   12/17/2019  8:40 AM Lexie Tesfaye MD [facility] IM [facility]               "

## 2019-11-12 ENCOUNTER — PATIENT OUTREACH (OUTPATIENT)
Dept: ADMINISTRATIVE | Facility: OTHER | Age: 69
End: 2019-11-12

## 2019-11-13 NOTE — PROGRESS NOTES
"    Cardiology Clinic Note  Reason for Visit: Consult for syncope    HPI:   Mr. William Bueno is a 68 y.o. gentleman with history of Hypertension and depression here as a consultation from Ms. Cathryn after she saw him on the 11th for syncope. He originally presented to the ED on the 7th, after he was working in the yard and noting that he felt weak. EKG done showed non-specific T wave abnormalities laterally. His prior inferior TWI were not present on this recent ECG. His labwork was only significant for a mild FLORENCIO. He had been reporting feeling weak and not drinking much. No orthostatics checked. He was given IVF and the patient reported feeling better.    He reports that this time, he was cutting wood and felt lightheaded (reports "darkness" coming) and passed out. He reports that he felt it coming on for 5 minutes prior. He had been working at [employer] for about an hour prior to this. He reports inadequate intake of food and water due fears of gaining weight. He denies any depression currently. He denies any light-headedness or weakness since eating and taking in fluids. He reports that he has been feeling faint twice before this episode in the last year. He did not pass out on these occurences. He reports at those times he was dehydrated. He denies syncopizing abruptly w/o warning. He denies any periods of low BP. He reports that he was sweating due to his yard work and it was hot that day. In addition denies any GI symptoms prior to passing out.    Denies any light-headedness with position change as well.     Denies any hx of MI.    Medications:  Felodipine 5 mg daily.    ROS:    Review of Systems   Constitution: Negative for chills and fever.   Cardiovascular: Positive for near-syncope and syncope. Negative for chest pain, dyspnea on exertion, irregular heartbeat, leg swelling, orthopnea and palpitations.   Respiratory: Negative for cough and shortness of breath.    Musculoskeletal: Negative for back pain. "   Gastrointestinal: Negative for melena, nausea and vomiting.   Neurological: Positive for light-headedness (as described in HPI). Negative for dizziness.     PMH:     Past Medical History:   Diagnosis Date    Depression     Emphysema of lung     Hypertension      Past Surgical History:   Procedure Laterality Date    COLONOSCOPY      HERNIA REPAIR Right 2017    open rih with mesh    TONSILLECTOMY       Allergies:   Review of patient's allergies indicates:  No Known Allergies  Medications:     Current Outpatient Medications on File Prior to Visit   Medication Sig Dispense Refill    ascorbic acid, vitamin C, (VITAMIN C) 500 MG tablet Take 500 mg by mouth once a week.      felodipine (PLENDIL) 5 MG 24 hr tablet Take 1 tablet (5 mg total) by mouth once daily. 90 tablet 3    multivitamin (ONE DAILY MULTIVITAMIN) per tablet Take 1 tablet by mouth once daily.      varenicline (CHANTIX STARTING MONTH BOX) 0.5 mg (11)- 1 mg (42) tablet Take as directed on Starter Pack. (Patient not taking: Reported on 2019) 1 Package 0    varenicline (CHANTIX) 1 mg Tab Take 1 tablet (1 mg total) by mouth 2 (two) times daily. After finishing the starter pack. (Patient not taking: Reported on 2019) 56 tablet 1     No current facility-administered medications on file prior to visit.      Social History:     Social History     Tobacco Use    Smoking status: Current Every Day Smoker     Packs/day: 0.25     Years: 45.00     Pack years: 11.25     Types: Cigarettes     Last attempt to quit: 2015     Years since quittin.1    Smokeless tobacco: Never Used   Substance Use Topics    Alcohol use: Yes     Alcohol/week: 4.0 standard drinks     Types: 3 Cans of beer, 1 Standard drinks or equivalent per week     Comment: 2-3 beers weekly     Family History:     Family History   Problem Relation Age of Onset    Diabetes Mother     Kidney disease Mother     Heart disease Father         chf    Cancer Brother     No Known  "Problems Daughter     No Known Problems Son     No Known Problems Brother      Physical Exam:   /71 (BP Location: Left arm, Patient Position: Sitting, BP Method: Large (Automatic))   Pulse 80   Ht 5' 9" (1.753 m)   Wt 71.8 kg (158 lb 4.6 oz)   BMI 23.38 kg/m²    Physical Exam   Constitutional: He is oriented to person, place, and time. He appears well-developed and well-nourished.   HENT:   Head: Normocephalic and atraumatic.   Eyes: Pupils are equal, round, and reactive to light. EOM are normal.   Neck: Normal range of motion. Neck supple. No JVD present.   Cardiovascular: Normal rate, regular rhythm, normal heart sounds and intact distal pulses.   No murmur heard.  Pulmonary/Chest: Effort normal and breath sounds normal.   Abdominal: Soft. Bowel sounds are normal.   Musculoskeletal: Normal range of motion. He exhibits no edema.   Neurological: He is alert and oriented to person, place, and time.       Labs:     Lab Results   Component Value Date     (L) 11/07/2019    K 4.5 11/07/2019     11/07/2019    CO2 21 (L) 11/07/2019    BUN 15 11/07/2019    CREATININE 1.5 (H) 11/07/2019    ANIONGAP 8 11/07/2019     Lab Results   Component Value Date    HGBA1C 6.0 07/15/2015     No results found for: BNP, BNPTRIAGEBLO Lab Results   Component Value Date    WBC 8.83 11/07/2019    HGB 14.1 11/07/2019    HCT 43.3 11/07/2019     11/07/2019    GRAN 6.9 11/07/2019    GRAN 77.5 (H) 11/07/2019     Lab Results   Component Value Date    CHOL 170 10/18/2018    HDL 55 10/18/2018    LDLCALC 102.6 10/18/2018    TRIG 62 10/18/2018          Imaging:     EKG:   Described above    Assessment:     1. Syncope and collapse    2. Essential hypertension        Syncope likely secondary to orthostatic hypotension due to poor PO intake of fluids and dehydration. No murmurs heard and no carotid bruits. Not anemic at that time. Agree with labwork ordered. No complaining of sx of dizziness with movement and reports that he " is better now with fluid intake. Advised against coffee intake.    If syncope still persists despite adequate fluid intake and sx not c/w vasovagal etiology can consider a holter/event monitor. No need for TTE at this time.    Counseled on smoking cessation as well.    BP is at goal. Defer to PCP. Not a DM.    Plan:   Syncope and collapse    Essential hypertension      Discussed with Dr. Gallego. RTC as needed.     Signed:  Mecca Rivero MD  11/13/2019 10:49 AM

## 2019-11-14 ENCOUNTER — TELEPHONE (OUTPATIENT)
Dept: INTERNAL MEDICINE | Facility: CLINIC | Age: 69
End: 2019-11-14

## 2019-11-14 ENCOUNTER — LAB VISIT (OUTPATIENT)
Dept: LAB | Facility: HOSPITAL | Age: 69
End: 2019-11-14
Payer: MEDICARE

## 2019-11-14 ENCOUNTER — OFFICE VISIT (OUTPATIENT)
Dept: CARDIOLOGY | Facility: CLINIC | Age: 69
End: 2019-11-14
Payer: MEDICARE

## 2019-11-14 VITALS
DIASTOLIC BLOOD PRESSURE: 71 MMHG | BODY MASS INDEX: 23.45 KG/M2 | HEIGHT: 69 IN | SYSTOLIC BLOOD PRESSURE: 119 MMHG | WEIGHT: 158.31 LBS | HEART RATE: 80 BPM

## 2019-11-14 DIAGNOSIS — R79.9 ABNORMAL FINDING OF BLOOD CHEMISTRY, UNSPECIFIED: ICD-10-CM

## 2019-11-14 DIAGNOSIS — Z12.5 SCREENING PSA (PROSTATE SPECIFIC ANTIGEN): ICD-10-CM

## 2019-11-14 DIAGNOSIS — I10 ESSENTIAL HYPERTENSION: ICD-10-CM

## 2019-11-14 DIAGNOSIS — Z13.220 SCREENING, LIPID: ICD-10-CM

## 2019-11-14 DIAGNOSIS — R55 SYNCOPE AND COLLAPSE: Primary | ICD-10-CM

## 2019-11-14 DIAGNOSIS — R55 SYNCOPE, UNSPECIFIED SYNCOPE TYPE: ICD-10-CM

## 2019-11-14 DIAGNOSIS — R53.83 FATIGUE, UNSPECIFIED TYPE: ICD-10-CM

## 2019-11-14 DIAGNOSIS — D64.9 ANEMIA, UNSPECIFIED TYPE: ICD-10-CM

## 2019-11-14 LAB
ALBUMIN SERPL BCP-MCNC: 3.8 G/DL (ref 3.5–5.2)
ALP SERPL-CCNC: 108 U/L (ref 55–135)
ALT SERPL W/O P-5'-P-CCNC: 20 U/L (ref 10–44)
ANION GAP SERPL CALC-SCNC: 6 MMOL/L (ref 8–16)
AST SERPL-CCNC: 18 U/L (ref 10–40)
BASOPHILS # BLD AUTO: 0.04 K/UL (ref 0–0.2)
BASOPHILS NFR BLD: 0.7 % (ref 0–1.9)
BILIRUB SERPL-MCNC: 0.6 MG/DL (ref 0.1–1)
BUN SERPL-MCNC: 15 MG/DL (ref 8–23)
CALCIUM SERPL-MCNC: 9.7 MG/DL (ref 8.7–10.5)
CHLORIDE SERPL-SCNC: 106 MMOL/L (ref 95–110)
CHOLEST SERPL-MCNC: 196 MG/DL (ref 120–199)
CHOLEST/HDLC SERPL: 3.8 {RATIO} (ref 2–5)
CO2 SERPL-SCNC: 26 MMOL/L (ref 23–29)
COMPLEXED PSA SERPL-MCNC: 4 NG/ML (ref 0–4)
CREAT SERPL-MCNC: 1.3 MG/DL (ref 0.5–1.4)
DIFFERENTIAL METHOD: ABNORMAL
EOSINOPHIL # BLD AUTO: 0.1 K/UL (ref 0–0.5)
EOSINOPHIL NFR BLD: 2.4 % (ref 0–8)
ERYTHROCYTE [DISTWIDTH] IN BLOOD BY AUTOMATED COUNT: 12.9 % (ref 11.5–14.5)
EST. GFR  (AFRICAN AMERICAN): >60 ML/MIN/1.73 M^2
EST. GFR  (NON AFRICAN AMERICAN): 56.5 ML/MIN/1.73 M^2
GLUCOSE SERPL-MCNC: 95 MG/DL (ref 70–110)
HCT VFR BLD AUTO: 40.8 % (ref 40–54)
HDLC SERPL-MCNC: 52 MG/DL (ref 40–75)
HDLC SERPL: 26.5 % (ref 20–50)
HGB BLD-MCNC: 13.2 G/DL (ref 14–18)
IMM GRANULOCYTES # BLD AUTO: 0.01 K/UL (ref 0–0.04)
IMM GRANULOCYTES NFR BLD AUTO: 0.2 % (ref 0–0.5)
LDLC SERPL CALC-MCNC: 124 MG/DL (ref 63–159)
LYMPHOCYTES # BLD AUTO: 1.8 K/UL (ref 1–4.8)
LYMPHOCYTES NFR BLD: 30.3 % (ref 18–48)
MCH RBC QN AUTO: 31.4 PG (ref 27–31)
MCHC RBC AUTO-ENTMCNC: 32.4 G/DL (ref 32–36)
MCV RBC AUTO: 97 FL (ref 82–98)
MONOCYTES # BLD AUTO: 0.5 K/UL (ref 0.3–1)
MONOCYTES NFR BLD: 8.6 % (ref 4–15)
NEUTROPHILS # BLD AUTO: 3.4 K/UL (ref 1.8–7.7)
NEUTROPHILS NFR BLD: 57.8 % (ref 38–73)
NONHDLC SERPL-MCNC: 144 MG/DL
NRBC BLD-RTO: 0 /100 WBC
PLATELET # BLD AUTO: 319 K/UL (ref 150–350)
PMV BLD AUTO: 9.3 FL (ref 9.2–12.9)
POTASSIUM SERPL-SCNC: 4.5 MMOL/L (ref 3.5–5.1)
PROT SERPL-MCNC: 7.8 G/DL (ref 6–8.4)
RBC # BLD AUTO: 4.2 M/UL (ref 4.6–6.2)
SODIUM SERPL-SCNC: 138 MMOL/L (ref 136–145)
TRIGL SERPL-MCNC: 100 MG/DL (ref 30–150)
TSH SERPL DL<=0.005 MIU/L-ACNC: 0.63 UIU/ML (ref 0.4–4)
WBC # BLD AUTO: 5.91 K/UL (ref 3.9–12.7)

## 2019-11-14 PROCEDURE — 3074F PR MOST RECENT SYSTOLIC BLOOD PRESSURE < 130 MM HG: ICD-10-PCS | Mod: HCNC,CPTII,GC,S$GLB | Performed by: STUDENT IN AN ORGANIZED HEALTH CARE EDUCATION/TRAINING PROGRAM

## 2019-11-14 PROCEDURE — 99214 OFFICE O/P EST MOD 30 MIN: CPT | Mod: HCNC,GC,S$GLB, | Performed by: STUDENT IN AN ORGANIZED HEALTH CARE EDUCATION/TRAINING PROGRAM

## 2019-11-14 PROCEDURE — 3078F PR MOST RECENT DIASTOLIC BLOOD PRESSURE < 80 MM HG: ICD-10-PCS | Mod: HCNC,CPTII,GC,S$GLB | Performed by: STUDENT IN AN ORGANIZED HEALTH CARE EDUCATION/TRAINING PROGRAM

## 2019-11-14 PROCEDURE — 3074F SYST BP LT 130 MM HG: CPT | Mod: HCNC,CPTII,GC,S$GLB | Performed by: STUDENT IN AN ORGANIZED HEALTH CARE EDUCATION/TRAINING PROGRAM

## 2019-11-14 PROCEDURE — 99999 PR PBB SHADOW E&M-EST. PATIENT-LVL III: ICD-10-PCS | Mod: PBBFAC,HCNC,GC, | Performed by: STUDENT IN AN ORGANIZED HEALTH CARE EDUCATION/TRAINING PROGRAM

## 2019-11-14 PROCEDURE — 3288F FALL RISK ASSESSMENT DOCD: CPT | Mod: HCNC,CPTII,GC,S$GLB | Performed by: STUDENT IN AN ORGANIZED HEALTH CARE EDUCATION/TRAINING PROGRAM

## 2019-11-14 PROCEDURE — 1100F PR PT FALLS ASSESS DOC 2+ FALLS/FALL W/INJURY/YR: ICD-10-PCS | Mod: HCNC,CPTII,GC,S$GLB | Performed by: STUDENT IN AN ORGANIZED HEALTH CARE EDUCATION/TRAINING PROGRAM

## 2019-11-14 PROCEDURE — 99214 PR OFFICE/OUTPT VISIT, EST, LEVL IV, 30-39 MIN: ICD-10-PCS | Mod: HCNC,GC,S$GLB, | Performed by: STUDENT IN AN ORGANIZED HEALTH CARE EDUCATION/TRAINING PROGRAM

## 2019-11-14 PROCEDURE — 80061 LIPID PANEL: CPT | Mod: HCNC

## 2019-11-14 PROCEDURE — 99999 PR PBB SHADOW E&M-EST. PATIENT-LVL III: CPT | Mod: PBBFAC,HCNC,GC, | Performed by: STUDENT IN AN ORGANIZED HEALTH CARE EDUCATION/TRAINING PROGRAM

## 2019-11-14 PROCEDURE — 3078F DIAST BP <80 MM HG: CPT | Mod: HCNC,CPTII,GC,S$GLB | Performed by: STUDENT IN AN ORGANIZED HEALTH CARE EDUCATION/TRAINING PROGRAM

## 2019-11-14 PROCEDURE — 36415 COLL VENOUS BLD VENIPUNCTURE: CPT | Mod: HCNC

## 2019-11-14 PROCEDURE — 1100F PTFALLS ASSESS-DOCD GE2>/YR: CPT | Mod: HCNC,CPTII,GC,S$GLB | Performed by: STUDENT IN AN ORGANIZED HEALTH CARE EDUCATION/TRAINING PROGRAM

## 2019-11-14 PROCEDURE — 84443 ASSAY THYROID STIM HORMONE: CPT | Mod: HCNC

## 2019-11-14 PROCEDURE — 84153 ASSAY OF PSA TOTAL: CPT | Mod: HCNC

## 2019-11-14 PROCEDURE — 85025 COMPLETE CBC W/AUTO DIFF WBC: CPT | Mod: HCNC

## 2019-11-14 PROCEDURE — 3288F PR FALLS RISK ASSESSMENT DOCUMENTED: ICD-10-PCS | Mod: HCNC,CPTII,GC,S$GLB | Performed by: STUDENT IN AN ORGANIZED HEALTH CARE EDUCATION/TRAINING PROGRAM

## 2019-11-14 PROCEDURE — 80053 COMPREHEN METABOLIC PANEL: CPT | Mod: HCNC

## 2019-11-14 NOTE — TELEPHONE ENCOUNTER
Please call patient with lab results.    PSA upper limit of normal but stable compared to prior (3.5 --> 4.0) - can discuss this with Dr. Tesfaye further at f/u in 4 weeks.    Thyroid normal.    Cholesterol higher than prior years - work on low cholesterol diet, consider medication - keep f/u with Dr. Tesfaye to discuss.    The 10-year ASCVD risk score (Magalie DU Jr., et al., 2013) is: 24.4%    Values used to calculate the score:      Age: 68 years      Sex: Male      Is Non- : Yes      Diabetic: No      Tobacco smoker: Yes      Systolic Blood Pressure: 119 mmHg      Is BP treated: Yes      HDL Cholesterol: 52 mg/dL      Total Cholesterol: 196 mg/dL    Blood sugar, kidney, and liver look fine.

## 2019-11-14 NOTE — PROGRESS NOTES
I have personally taken the history and examined this patient and agree with the Fellow's note as stated above.    Warned about keeping adequately hydrated.  This is not due to intrinsic heart disease.

## 2019-11-19 RX ORDER — FELODIPINE 5 MG/1
TABLET, EXTENDED RELEASE ORAL
Qty: 90 TABLET | Refills: 4 | Status: SHIPPED | OUTPATIENT
Start: 2019-11-19 | End: 2021-03-03 | Stop reason: SDUPTHER

## 2019-12-03 ENCOUNTER — PATIENT OUTREACH (OUTPATIENT)
Dept: ADMINISTRATIVE | Facility: HOSPITAL | Age: 69
End: 2019-12-03

## 2019-12-17 ENCOUNTER — OFFICE VISIT (OUTPATIENT)
Dept: INTERNAL MEDICINE | Facility: CLINIC | Age: 69
End: 2019-12-17
Payer: MEDICARE

## 2019-12-17 VITALS
HEIGHT: 69 IN | HEART RATE: 87 BPM | BODY MASS INDEX: 24.13 KG/M2 | OXYGEN SATURATION: 97 % | WEIGHT: 162.94 LBS | DIASTOLIC BLOOD PRESSURE: 84 MMHG | SYSTOLIC BLOOD PRESSURE: 130 MMHG

## 2019-12-17 DIAGNOSIS — E78.5 HYPERLIPIDEMIA, UNSPECIFIED HYPERLIPIDEMIA TYPE: ICD-10-CM

## 2019-12-17 DIAGNOSIS — R55 SYNCOPE, UNSPECIFIED SYNCOPE TYPE: ICD-10-CM

## 2019-12-17 DIAGNOSIS — I10 ESSENTIAL HYPERTENSION: Primary | ICD-10-CM

## 2019-12-17 DIAGNOSIS — F43.21 GRIEF REACTION: ICD-10-CM

## 2019-12-17 DIAGNOSIS — F17.200 TOBACCO DEPENDENCY: ICD-10-CM

## 2019-12-17 PROCEDURE — 3079F PR MOST RECENT DIASTOLIC BLOOD PRESSURE 80-89 MM HG: ICD-10-PCS | Mod: HCNC,CPTII,S$GLB, | Performed by: INTERNAL MEDICINE

## 2019-12-17 PROCEDURE — 99999 PR PBB SHADOW E&M-EST. PATIENT-LVL III: ICD-10-PCS | Mod: PBBFAC,HCNC,, | Performed by: INTERNAL MEDICINE

## 2019-12-17 PROCEDURE — 99999 PR PBB SHADOW E&M-EST. PATIENT-LVL III: CPT | Mod: PBBFAC,HCNC,, | Performed by: INTERNAL MEDICINE

## 2019-12-17 PROCEDURE — 1100F PTFALLS ASSESS-DOCD GE2>/YR: CPT | Mod: HCNC,CPTII,S$GLB, | Performed by: INTERNAL MEDICINE

## 2019-12-17 PROCEDURE — 1126F PR PAIN SEVERITY QUANTIFIED, NO PAIN PRESENT: ICD-10-PCS | Mod: HCNC,S$GLB,, | Performed by: INTERNAL MEDICINE

## 2019-12-17 PROCEDURE — 3288F FALL RISK ASSESSMENT DOCD: CPT | Mod: HCNC,CPTII,S$GLB, | Performed by: INTERNAL MEDICINE

## 2019-12-17 PROCEDURE — 1126F AMNT PAIN NOTED NONE PRSNT: CPT | Mod: HCNC,S$GLB,, | Performed by: INTERNAL MEDICINE

## 2019-12-17 PROCEDURE — 99214 OFFICE O/P EST MOD 30 MIN: CPT | Mod: HCNC,S$GLB,, | Performed by: INTERNAL MEDICINE

## 2019-12-17 PROCEDURE — 3075F PR MOST RECENT SYSTOLIC BLOOD PRESS GE 130-139MM HG: ICD-10-PCS | Mod: HCNC,CPTII,S$GLB, | Performed by: INTERNAL MEDICINE

## 2019-12-17 PROCEDURE — 3075F SYST BP GE 130 - 139MM HG: CPT | Mod: HCNC,CPTII,S$GLB, | Performed by: INTERNAL MEDICINE

## 2019-12-17 PROCEDURE — 99214 PR OFFICE/OUTPT VISIT, EST, LEVL IV, 30-39 MIN: ICD-10-PCS | Mod: HCNC,S$GLB,, | Performed by: INTERNAL MEDICINE

## 2019-12-17 PROCEDURE — 1159F PR MEDICATION LIST DOCUMENTED IN MEDICAL RECORD: ICD-10-PCS | Mod: HCNC,S$GLB,, | Performed by: INTERNAL MEDICINE

## 2019-12-17 PROCEDURE — 1159F MED LIST DOCD IN RCRD: CPT | Mod: HCNC,S$GLB,, | Performed by: INTERNAL MEDICINE

## 2019-12-17 PROCEDURE — 3288F PR FALLS RISK ASSESSMENT DOCUMENTED: ICD-10-PCS | Mod: HCNC,CPTII,S$GLB, | Performed by: INTERNAL MEDICINE

## 2019-12-17 PROCEDURE — 3079F DIAST BP 80-89 MM HG: CPT | Mod: HCNC,CPTII,S$GLB, | Performed by: INTERNAL MEDICINE

## 2019-12-17 PROCEDURE — 1100F PR PT FALLS ASSESS DOC 2+ FALLS/FALL W/INJURY/YR: ICD-10-PCS | Mod: HCNC,CPTII,S$GLB, | Performed by: INTERNAL MEDICINE

## 2019-12-17 NOTE — PROGRESS NOTES
Subjective:       Patient ID: William Bueno is a 68 y.o. male.    Chief Complaint: Follow-up                   Syncope, depression  HPI   Olivia Mclean's note reviewed.     We reviewed depression induced by wife's death.        He has realized his sense of loss is normal.        He is active in his [employer].      Lives in  residence ([facility])  Interacting in social events.    Cardiology eval for syncope reviewed.      Syncope thought due to dehydration.         He now is concentrating of water intake.    Long standing htn, controlled with plendil.  Adherence est at 70%.      Recent labs reviewed - all fine.    H/o early aortic atheroslcerosis. Smoker.       Review of Systems   Constitutional: Negative for activity change and unexpected weight change.   Respiratory: Negative for chest tightness and shortness of breath.    Cardiovascular: Negative for chest pain and leg swelling.   Gastrointestinal: Negative for abdominal pain.   Neurological: Negative for headaches.   Psychiatric/Behavioral: Negative for dysphoric mood.       Objective:      Physical Exam   Constitutional: He is oriented to person, place, and time. He appears well-developed and well-nourished.   Eyes: No scleral icterus.   Neck: No JVD present. No thyromegaly present.   Cardiovascular: Normal rate, regular rhythm and normal heart sounds.   Pulmonary/Chest: Effort normal and breath sounds normal. No respiratory distress. He has no wheezes. He has no rales.   Abdominal: Soft. He exhibits no mass. There is no tenderness.   Musculoskeletal: He exhibits no edema.   Neurological: He is alert and oriented to person, place, and time.   Psychiatric: He has a normal mood and affect. His behavior is normal.       Lab Results   Component Value Date    WBC 5.91 11/14/2019    HGB 13.2 (L) 11/14/2019    HCT 40.8 11/14/2019     11/14/2019    CHOL 196 11/14/2019    TRIG 100 11/14/2019    HDL 52 11/14/2019    ALT 20 11/14/2019    AST 18  11/14/2019     11/14/2019    K 4.5 11/14/2019     11/14/2019    CREATININE 1.3 11/14/2019    BUN 15 11/14/2019    CO2 26 11/14/2019    TSH 0.631 11/14/2019    PSA 4.0 11/14/2019    GLUF 93 07/15/2015    HGBA1C 6.0 07/15/2015   The 10-year ASCVD risk score (Magalie DU Jr., et al., 2013) is: 28.2%    Values used to calculate the score:      Age: 68 years      Sex: Male      Is Non- : Yes      Diabetic: No      Tobacco smoker: Yes      Systolic Blood Pressure: 130 mmHg      Is BP treated: Yes      HDL Cholesterol: 52 mg/dL      Total Cholesterol: 196 mg/dL  Assessment:       1. Essential hypertension    2. Syncope, unspecified syncope type    3. Hyperlipidemia, unspecified hyperlipidemia type    4. Tobacco dependency    5. Grief reaction        Plan:       William was seen today for follow-up.    Diagnoses and all orders for this visit:    Essential hypertension    Syncope, unspecified syncope type    Hyperlipidemia, unspecified hyperlipidemia type  -     Lipid panel; Future  -     ALT (SGPT); Future  -     AST (SGOT); Future    Tobacco dependency    Grief reaction       Stop smoking.     Start atorvastatin    He declines CT to screen for lung cancer.    Unable to attend tobacco cessation center.

## 2020-01-15 ENCOUNTER — OFFICE VISIT (OUTPATIENT)
Dept: HOME HEALTH SERVICES | Facility: CLINIC | Age: 70
End: 2020-01-15
Payer: MEDICARE

## 2020-01-15 VITALS
HEIGHT: 69 IN | SYSTOLIC BLOOD PRESSURE: 133 MMHG | WEIGHT: 158 LBS | HEART RATE: 84 BPM | DIASTOLIC BLOOD PRESSURE: 90 MMHG | BODY MASS INDEX: 23.4 KG/M2

## 2020-01-15 DIAGNOSIS — N52.9 ERECTILE DYSFUNCTION, UNSPECIFIED ERECTILE DYSFUNCTION TYPE: ICD-10-CM

## 2020-01-15 DIAGNOSIS — I10 ESSENTIAL HYPERTENSION: ICD-10-CM

## 2020-01-15 DIAGNOSIS — F17.200 TOBACCO DEPENDENCY: ICD-10-CM

## 2020-01-15 DIAGNOSIS — Z00.00 ENCOUNTER FOR PREVENTIVE HEALTH EXAMINATION: Primary | ICD-10-CM

## 2020-01-15 DIAGNOSIS — Z74.09 OTHER REDUCED MOBILITY: ICD-10-CM

## 2020-01-15 DIAGNOSIS — D64.9 ANEMIA, UNSPECIFIED TYPE: ICD-10-CM

## 2020-01-15 DIAGNOSIS — J43.2 CENTRILOBULAR EMPHYSEMA: ICD-10-CM

## 2020-01-15 DIAGNOSIS — F32.5 MAJOR DEPRESSIVE DISORDER WITH SINGLE EPISODE, IN FULL REMISSION: ICD-10-CM

## 2020-01-15 DIAGNOSIS — I70.0 AORTIC ATHEROSCLEROSIS: ICD-10-CM

## 2020-01-15 PROCEDURE — G0439 PR MEDICARE ANNUAL WELLNESS SUBSEQUENT VISIT: ICD-10-PCS | Mod: S$GLB,,, | Performed by: NURSE PRACTITIONER

## 2020-01-15 PROCEDURE — 3080F PR MOST RECENT DIASTOLIC BLOOD PRESSURE >= 90 MM HG: ICD-10-PCS | Mod: CPTII,S$GLB,, | Performed by: NURSE PRACTITIONER

## 2020-01-15 PROCEDURE — 3075F PR MOST RECENT SYSTOLIC BLOOD PRESS GE 130-139MM HG: ICD-10-PCS | Mod: CPTII,S$GLB,, | Performed by: NURSE PRACTITIONER

## 2020-01-15 PROCEDURE — 3075F SYST BP GE 130 - 139MM HG: CPT | Mod: CPTII,S$GLB,, | Performed by: NURSE PRACTITIONER

## 2020-01-15 PROCEDURE — G0439 PPPS, SUBSEQ VISIT: HCPCS | Mod: S$GLB,,, | Performed by: NURSE PRACTITIONER

## 2020-01-15 PROCEDURE — 3080F DIAST BP >= 90 MM HG: CPT | Mod: CPTII,S$GLB,, | Performed by: NURSE PRACTITIONER

## 2020-01-15 NOTE — PATIENT INSTRUCTIONS
Counseling and Referral of Other Preventative  (Italic type indicates deductible and co-insurance are waived)    Patient Name: William Bueno  Today's Date: 1/15/2020    Health Maintenance       Date Due Completion Date    Colonoscopy 01/23/2020 (Originally 8/28/2018) 8/28/2015    TETANUS VACCINE 02/03/2020 (Originally [birth year]/24/1969) ---    High Dose Statin 02/03/2020 (Originally 12/24/1972) ---    Shingles Vaccine (1 of 2) 02/03/2020 (Originally 12/24/2001) ---    Lipid Panel 11/14/2024 11/14/2019        No orders of the defined types were placed in this encounter.    The following information is provided to all patients.  This information is to help you find resources for any of the problems found today that may be affecting your health:                Living healthy guide: www.[facility].[place].[facility]      Understanding Diabetes: www.diabetes.org      Eating healthy: www.cdc.gov/healthyweight      [facility] [place] home safety checklist: www.cdc.gov/steadi/patient.html      Agency on Aging: www.goea.[place].[facility]      Alcoholics anonymous (AA): www.aa.org      Physical Activity: www.[place].nih.gov/oi8gmoa      Tobacco use: www.quitwithusla.org

## 2020-01-15 NOTE — PROGRESS NOTES
"William Bueno presented for a  Medicare AWV and comprehensive Health Risk Assessment today. The following components were reviewed and updated:    · Medical history  · Family History  · Social history  · Allergies and Current Medications  · Health Risk Assessment  · Health Maintenance  · Care Team     ** See Completed Assessments for Annual Wellness Visit within the encounter summary.**       The following assessments were completed:  · Living Situation  · CAGE  · Depression Screening  · Timed Get Up and Go  · Whisper Test  · Cognitive Function Screening  ·   ·   ·   · Nutrition Screening  · ADL Screening  · PAQ Screening    Vitals:    01/15/20 0912   BP: (!) 133/90   Pulse: 84   Weight: 71.7 kg (158 lb)   Height: 5' 9" (1.753 m)     Body mass index is 23.33 kg/m².  Physical Exam   Constitutional: He is oriented to person, place, and time. He appears well-developed.   HENT:   Head: Normocephalic and atraumatic.   Eyes: Pupils are equal, round, and reactive to light. EOM are normal.   Neck: Normal range of motion.   Cardiovascular: Normal rate, regular rhythm and normal heart sounds.   Pulmonary/Chest: Effort normal and breath sounds normal. No respiratory distress.   Abdominal: Soft. Bowel sounds are normal. He exhibits no distension.   Musculoskeletal: Normal range of motion. He exhibits no edema.   Neurological: He is alert and oriented to person, place, and time.   Skin: Skin is warm and dry.   Psychiatric: He has a normal mood and affect. His behavior is normal.         Diagnoses and health risks identified today and associated recommendations/orders:    1. Encounter for preventive health examination  Assessment completed. Preventive measures reviewed with patient.    2. Other reduced mobility  Stable, followed by PCP.    3. Major depressive disorder with single episode, in full remission  Stable, followed by PCP.    4. Centrilobular emphysema  Stable, followed by PCP.    5. Essential hypertension  Stable, " followed by PCP.    6. Aortic atherosclerosis  Stable, followed by PCP.    7. Erectile dysfunction, unspecified erectile dysfunction type  Stable, followed by PCP.    8. Anemia, unspecified type  Stable, followed by PCP.    9. Tobacco dependency  Decline cessation therapy at this time. Followed by PCP.    Provided William with a 5-10 year written screening schedule and personal prevention plan. Recommendations were developed using the USPSTF age appropriate recommendations. Education, counseling, and referrals were provided as needed. After Visit Summary printed and given to patient which includes a list of additional screenings\tests needed.    No follow-ups on file.    Mahnaz Swanson NP  I offered to discuss end of life issues, including information on how to make advance directives that the patient could use to name someone who would make medical decisions on their behalf if they became too ill to make themselves.    ___Patient declined  _X_Patient is interested, I provided paper work and offered to discuss.

## 2020-02-04 ENCOUNTER — OFFICE VISIT (OUTPATIENT)
Dept: INTERNAL MEDICINE | Facility: CLINIC | Age: 70
End: 2020-02-04
Payer: MEDICARE

## 2020-02-04 VITALS
DIASTOLIC BLOOD PRESSURE: 82 MMHG | HEART RATE: 84 BPM | WEIGHT: 156.5 LBS | BODY MASS INDEX: 23.18 KG/M2 | OXYGEN SATURATION: 98 % | HEIGHT: 69 IN | SYSTOLIC BLOOD PRESSURE: 132 MMHG

## 2020-02-04 DIAGNOSIS — E78.5 HYPERLIPIDEMIA, UNSPECIFIED HYPERLIPIDEMIA TYPE: ICD-10-CM

## 2020-02-04 DIAGNOSIS — I70.0 AORTIC ATHEROSCLEROSIS: ICD-10-CM

## 2020-02-04 DIAGNOSIS — L30.9 ECZEMA, UNSPECIFIED TYPE: ICD-10-CM

## 2020-02-04 DIAGNOSIS — Z86.010 HISTORY OF ADENOMATOUS POLYP OF COLON: ICD-10-CM

## 2020-02-04 DIAGNOSIS — I10 HYPERTENSION, ESSENTIAL: ICD-10-CM

## 2020-02-04 DIAGNOSIS — K63.5 DYSPLASTIC COLON POLYP: ICD-10-CM

## 2020-02-04 DIAGNOSIS — F17.200 TOBACCO DEPENDENCY: ICD-10-CM

## 2020-02-04 DIAGNOSIS — Z00.00 ANNUAL PHYSICAL EXAM: Primary | ICD-10-CM

## 2020-02-04 PROCEDURE — 3075F SYST BP GE 130 - 139MM HG: CPT | Mod: CPTII,S$GLB,, | Performed by: INTERNAL MEDICINE

## 2020-02-04 PROCEDURE — 99214 OFFICE O/P EST MOD 30 MIN: CPT | Mod: S$GLB,,, | Performed by: INTERNAL MEDICINE

## 2020-02-04 PROCEDURE — 3079F PR MOST RECENT DIASTOLIC BLOOD PRESSURE 80-89 MM HG: ICD-10-PCS | Mod: CPTII,S$GLB,, | Performed by: INTERNAL MEDICINE

## 2020-02-04 PROCEDURE — 99214 PR OFFICE/OUTPT VISIT, EST, LEVL IV, 30-39 MIN: ICD-10-PCS | Mod: S$GLB,,, | Performed by: INTERNAL MEDICINE

## 2020-02-04 PROCEDURE — 99499 RISK ADDL DX/OHS AUDIT: ICD-10-PCS | Mod: S$GLB,,, | Performed by: INTERNAL MEDICINE

## 2020-02-04 PROCEDURE — 99999 PR PBB SHADOW E&M-EST. PATIENT-LVL III: ICD-10-PCS | Mod: PBBFAC,,, | Performed by: INTERNAL MEDICINE

## 2020-02-04 PROCEDURE — 3075F PR MOST RECENT SYSTOLIC BLOOD PRESS GE 130-139MM HG: ICD-10-PCS | Mod: CPTII,S$GLB,, | Performed by: INTERNAL MEDICINE

## 2020-02-04 PROCEDURE — 3079F DIAST BP 80-89 MM HG: CPT | Mod: CPTII,S$GLB,, | Performed by: INTERNAL MEDICINE

## 2020-02-04 PROCEDURE — 99999 PR PBB SHADOW E&M-EST. PATIENT-LVL III: CPT | Mod: PBBFAC,,, | Performed by: INTERNAL MEDICINE

## 2020-02-04 PROCEDURE — 99499 UNLISTED E&M SERVICE: CPT | Mod: S$GLB,,, | Performed by: INTERNAL MEDICINE

## 2020-02-04 RX ORDER — VARENICLINE TARTRATE 1 MG/1
1 TABLET, FILM COATED ORAL 2 TIMES DAILY
Qty: 60 TABLET | Refills: 5 | Status: SHIPPED | OUTPATIENT
Start: 2020-02-04 | End: 2020-03-05

## 2020-02-04 RX ORDER — VARENICLINE TARTRATE 0.5 (11)-1
KIT ORAL
Qty: 9 TABLET | Refills: 0 | Status: SHIPPED | OUTPATIENT
Start: 2020-02-04 | End: 2020-03-09

## 2020-02-04 RX ORDER — ATORVASTATIN CALCIUM 20 MG/1
20 TABLET, FILM COATED ORAL DAILY
Qty: 90 TABLET | Refills: 3 | Status: SHIPPED | OUTPATIENT
Start: 2020-02-04 | End: 2021-01-28

## 2020-02-04 RX ORDER — TRIAMCINOLONE ACETONIDE 1 MG/G
CREAM TOPICAL 2 TIMES DAILY
Qty: 15 G | Refills: 1 | Status: SHIPPED | OUTPATIENT
Start: 2020-02-04 | End: 2021-11-24

## 2020-02-04 NOTE — PROGRESS NOTES
Subjective:       Patient ID: William Bueno is a 69 y.o. male.    Chief Complaint: Follow-up    HPI   Has new insurance ([employer])    Was told to come in for a check up.  I just saw him Dec 17.    No interval complaints.      He has long standing htn, controlled.  Atorvastatin prescribed last visit, but he is not taking it..  ASCVD risk 28.2%.  Aortic arteriosclerosis present.     Last colonoscopy reviewed. Sessile serrated adenoma with low-grade dysplasia removed.  .     He would like to stop smoking.  Requests another rx of chantix, which helped him in past.  [facility].  Transportation prevents him from attending now.    Also with dry patch itchy skin top of r foot.    Review of Systems   Constitutional: Negative for activity change and unexpected weight change.   Respiratory: Negative for chest tightness and shortness of breath.    Cardiovascular: Negative for chest pain and leg swelling.   Gastrointestinal: Negative for abdominal pain.   Neurological: Negative for headaches.   Psychiatric/Behavioral: Negative for dysphoric mood.       Objective:      Physical Exam   Constitutional: He is oriented to person, place, and time. He appears well-developed and well-nourished.   Eyes: No scleral icterus.   Neck: No JVD present. No thyromegaly present.   Cardiovascular: Normal rate, regular rhythm and normal heart sounds.   Pulmonary/Chest: Effort normal and breath sounds normal. No respiratory distress. He has no wheezes. He has no rales.   Abdominal: Soft. He exhibits no mass. There is no tenderness.   Musculoskeletal: He exhibits no edema.   Neurological: He is alert and oriented to person, place, and time.   Skin:   Dry hyperpigmented patch over r 1st metatarsal -    Psychiatric: He has a normal mood and affect. His behavior is normal.       Assessment:       1. Annual physical exam    2. Hyperlipidemia, unspecified hyperlipidemia type    3. Hypertension, essential    4. History of  adenomatous polyp of colon    5. Dysplastic colon polyp    6. Aortic atherosclerosis    7. Tobacco dependency    8. Eczema, unspecified type        Plan:       William was seen today for follow-up.    Diagnoses and all orders for this visit:    Annual physical exam    Hyperlipidemia, unspecified hyperlipidemia type    Hypertension, essential    History of adenomatous polyp of colon  -     Case request GI: COLONOSCOPY    Dysplastic colon polyp    Aortic atherosclerosis    Tobacco dependency  -     varenicline (CHANTIX KYRA) 0.5 mg (11)- 1 mg (42) tablet; Take one 0.5mg tablet by mouth once daily for 3 days, then increase to one 0.5mg tablet twice daily for 3 days, then increase to one 1mg tablet twice daily.  -     varenicline (CHANTIX) 1 mg Tab; Take 1 tablet (1 mg total) by mouth 2 (two) times daily.    Eczema, unspecified type    Other orders  -     atorvastatin (LIPITOR) 20 MG tablet; Take 1 tablet (20 mg total) by mouth once daily.  -     triamcinolone acetonide 0.1% (KENALOG) 0.1 % cream; Apply topically 2 (two) times daily. for 10 days       tdap     shingrix rec'd

## 2020-03-07 DIAGNOSIS — F17.200 TOBACCO DEPENDENCY: ICD-10-CM

## 2020-03-09 RX ORDER — VARENICLINE TARTRATE 0.5 MG/1
0.5 TABLET, FILM COATED ORAL DAILY
Qty: 60 TABLET | Refills: 5 | Status: SHIPPED | OUTPATIENT
Start: 2020-03-09 | End: 2021-04-21

## 2020-03-09 RX ORDER — VARENICLINE TARTRATE 0.5 (11)-1
KIT ORAL
Qty: 1 TABLET | Refills: 0 | Status: SHIPPED | OUTPATIENT
Start: 2020-03-09 | End: 2021-04-21

## 2020-03-09 NOTE — PROGRESS NOTES
Refill Routing Note     Medication(s) are appropriate for refill:    Medication Outside of Protocol    Appointments  past 15m or future 3m with PCP    Date Provider   Last Visit   2/4/2020 Lexie Tesfaye MD   Next Visit   Visit date not found Lexie Tesfaye MD       Automatic Epic Protocol Generated Data:    Requested Prescriptions   Pending Prescriptions Disp Refills    varenicline (CHANTIX STARTING MONTH BOX) 0.5 mg (11)- 1 mg (42) tablet 1 tablet 0     Sig: PLEASE SEE ATTACHED FOR DETAILED DIRECTIONS       There is no refill protocol information for this order       varenicline (CHANTIX) 1 mg Tab 60 tablet 0     Sig: Take 1 tablet (1 mg total) by mouth once daily.       There is no refill protocol information for this order           Note created:8:25 AM 03/09/2020

## 2020-03-17 ENCOUNTER — LAB VISIT (OUTPATIENT)
Dept: LAB | Facility: HOSPITAL | Age: 70
End: 2020-03-17
Attending: INTERNAL MEDICINE
Payer: MEDICARE

## 2020-03-17 DIAGNOSIS — E78.5 HYPERLIPIDEMIA, UNSPECIFIED HYPERLIPIDEMIA TYPE: ICD-10-CM

## 2020-03-17 LAB
ALT SERPL W/O P-5'-P-CCNC: 33 U/L (ref 10–44)
AST SERPL-CCNC: 25 U/L (ref 10–40)
CHOLEST SERPL-MCNC: 122 MG/DL (ref 120–199)
CHOLEST/HDLC SERPL: 2.5 {RATIO} (ref 2–5)
HDLC SERPL-MCNC: 49 MG/DL (ref 40–75)
HDLC SERPL: 40.2 % (ref 20–50)
LDLC SERPL CALC-MCNC: 63 MG/DL (ref 63–159)
NONHDLC SERPL-MCNC: 73 MG/DL
TRIGL SERPL-MCNC: 50 MG/DL (ref 30–150)

## 2020-03-17 PROCEDURE — 80061 LIPID PANEL: CPT

## 2020-03-17 PROCEDURE — 84450 TRANSFERASE (AST) (SGOT): CPT

## 2020-03-17 PROCEDURE — 36415 COLL VENOUS BLD VENIPUNCTURE: CPT | Mod: PO

## 2020-03-17 PROCEDURE — 84460 ALANINE AMINO (ALT) (SGPT): CPT

## 2020-06-19 DIAGNOSIS — Z12.11 SPECIAL SCREENING FOR MALIGNANT NEOPLASMS, COLON: Primary | ICD-10-CM

## 2020-06-19 RX ORDER — POLYETHYLENE GLYCOL 3350, SODIUM SULFATE ANHYDROUS, SODIUM BICARBONATE, SODIUM CHLORIDE, POTASSIUM CHLORIDE 236; 22.74; 6.74; 5.86; 2.97 G/4L; G/4L; G/4L; G/4L; G/4L
4 POWDER, FOR SOLUTION ORAL ONCE
Qty: 4000 ML | Refills: 0 | Status: SHIPPED | OUTPATIENT
Start: 2020-06-19 | End: 2020-06-19

## 2020-06-20 ENCOUNTER — LAB VISIT (OUTPATIENT)
Dept: URGENT CARE | Facility: CLINIC | Age: 70
End: 2020-06-20
Payer: MEDICARE

## 2020-06-20 PROCEDURE — U0003 INFECTIOUS AGENT DETECTION BY NUCLEIC ACID (DNA OR RNA); SEVERE ACUTE RESPIRATORY SYNDROME CORONAVIRUS 2 (SARS-COV-2) (CORONAVIRUS DISEASE [COVID-19]), AMPLIFIED PROBE TECHNIQUE, MAKING USE OF HIGH THROUGHPUT TECHNOLOGIES AS DESCRIBED BY CMS-2020-01-R: HCPCS

## 2020-06-21 LAB — SARS-COV-2 RNA RESP QL NAA+PROBE: NOT DETECTED

## 2020-06-22 ENCOUNTER — ANESTHESIA (OUTPATIENT)
Dept: ENDOSCOPY | Facility: HOSPITAL | Age: 70
End: 2020-06-22
Payer: MEDICARE

## 2020-06-22 ENCOUNTER — ANESTHESIA EVENT (OUTPATIENT)
Dept: ENDOSCOPY | Facility: HOSPITAL | Age: 70
End: 2020-06-22
Payer: MEDICARE

## 2020-06-22 ENCOUNTER — HOSPITAL ENCOUNTER (OUTPATIENT)
Facility: HOSPITAL | Age: 70
Discharge: HOME | End: 2020-06-22
Attending: COLON & RECTAL SURGERY | Admitting: COLON & RECTAL SURGERY
Payer: MEDICARE

## 2020-06-22 VITALS
HEART RATE: 74 BPM | BODY MASS INDEX: 23.7 KG/M2 | SYSTOLIC BLOOD PRESSURE: 135 MMHG | WEIGHT: 160 LBS | DIASTOLIC BLOOD PRESSURE: 78 MMHG | RESPIRATION RATE: 16 BRPM | HEIGHT: 69 IN | TEMPERATURE: 98 F | OXYGEN SATURATION: 98 %

## 2020-06-22 DIAGNOSIS — Z86.010 HISTORY OF COLON POLYPS: ICD-10-CM

## 2020-06-22 PROBLEM — Z86.0100 HISTORY OF COLON POLYPS: Status: ACTIVE | Noted: 2020-06-22

## 2020-06-22 PROCEDURE — 27201012 HC FORCEPS, HOT/COLD, DISP: Performed by: COLON & RECTAL SURGERY

## 2020-06-22 PROCEDURE — E9220 PRA ENDO ANESTHESIA: ICD-10-PCS | Mod: PT,,, | Performed by: NURSE ANESTHETIST, CERTIFIED REGISTERED

## 2020-06-22 PROCEDURE — 37000008 HC ANESTHESIA 1ST 15 MINUTES: Performed by: COLON & RECTAL SURGERY

## 2020-06-22 PROCEDURE — 45380 PR COLONOSCOPY,BIOPSY: ICD-10-PCS | Mod: PT,,, | Performed by: COLON & RECTAL SURGERY

## 2020-06-22 PROCEDURE — E9220 PRA ENDO ANESTHESIA: HCPCS | Mod: PT,,, | Performed by: NURSE ANESTHETIST, CERTIFIED REGISTERED

## 2020-06-22 PROCEDURE — 45380 COLONOSCOPY AND BIOPSY: CPT | Mod: PT,,, | Performed by: COLON & RECTAL SURGERY

## 2020-06-22 PROCEDURE — 63600175 PHARM REV CODE 636 W HCPCS: Performed by: NURSE ANESTHETIST, CERTIFIED REGISTERED

## 2020-06-22 PROCEDURE — 25000003 PHARM REV CODE 250: Performed by: COLON & RECTAL SURGERY

## 2020-06-22 PROCEDURE — 88305 TISSUE EXAM BY PATHOLOGIST: ICD-10-PCS | Mod: 26,,, | Performed by: PATHOLOGY

## 2020-06-22 PROCEDURE — 37000009 HC ANESTHESIA EA ADD 15 MINS: Performed by: COLON & RECTAL SURGERY

## 2020-06-22 PROCEDURE — 88305 TISSUE EXAM BY PATHOLOGIST: CPT | Performed by: PATHOLOGY

## 2020-06-22 PROCEDURE — 45380 COLONOSCOPY AND BIOPSY: CPT | Performed by: COLON & RECTAL SURGERY

## 2020-06-22 PROCEDURE — 88305 TISSUE EXAM BY PATHOLOGIST: CPT | Mod: 26,,, | Performed by: PATHOLOGY

## 2020-06-22 RX ORDER — SODIUM CHLORIDE 9 MG/ML
INJECTION, SOLUTION INTRAVENOUS CONTINUOUS
Status: DISCONTINUED | OUTPATIENT
Start: 2020-06-22 | End: 2020-06-22 | Stop reason: HOSPADM

## 2020-06-22 RX ORDER — PROPOFOL 10 MG/ML
VIAL (ML) INTRAVENOUS CONTINUOUS PRN
Status: DISCONTINUED | OUTPATIENT
Start: 2020-06-22 | End: 2020-06-22

## 2020-06-22 RX ORDER — PROPOFOL 10 MG/ML
VIAL (ML) INTRAVENOUS
Status: DISCONTINUED | OUTPATIENT
Start: 2020-06-22 | End: 2020-06-22

## 2020-06-22 RX ADMIN — PROPOFOL 100 MG: 10 INJECTION, EMULSION INTRAVENOUS at 01:06

## 2020-06-22 RX ADMIN — SODIUM CHLORIDE: 0.9 INJECTION, SOLUTION INTRAVENOUS at 01:06

## 2020-06-22 RX ADMIN — PROPOFOL 175 MCG/KG/MIN: 10 INJECTION, EMULSION INTRAVENOUS at 01:06

## 2020-06-22 NOTE — [employer] PATIENT INSTRUCTIONS
Discharge Summary/Instructions after an Endoscopic Procedure  Patient Name: William Bueno  Patient MRN: [id number]  Patient YOB: 1950 Monday, June 22, 2020  Woody Mckeon MD  RESTRICTIONS:  During your procedure today, you received medications for sedation.  These   medications may affect your judgment, balance and coordination.  Therefore,   for 24 hours, you have the following restrictions:   - DO NOT drive a car, operate machinery, make legal/financial decisions,   sign important papers or drink alcohol.    ACTIVITY:  Today: no heavy lifting, straining or running due to procedural   sedation/anesthesia.  The following day: return to full activity including work.  DIET:  Eat and drink normally unless instructed otherwise.     TREATMENT FOR COMMON SIDE EFFECTS:  - Mild abdominal pain, nausea, belching, bloating or excessive gas:  rest,   eat lightly and use a heating pad.  - Sore Throat: treat with throat lozenges and/or gargle with warm salt   water.  - Because air was used during the procedure, expelling large amounts of air   from your rectum or belching is normal.  - If a bowel prep was taken, you may not have a bowel movement for 1-3 days.    This is normal.  SYMPTOMS TO WATCH FOR AND REPORT TO YOUR PHYSICIAN:  1. Abdominal pain or bloating, other than gas cramps.  2. Chest pain.  3. Back pain.  4. Signs of infection such as: chills or fever occurring within 24 hours   after the procedure.  5. Rectal bleeding, which would show as bright red, maroon, or black stools.   (A tablespoon of blood from the rectum is not serious, especially if   hemorrhoids are present.)  6. Vomiting.  7. Weakness or dizziness.  GO DIRECTLY TO THE NEAREST EMERGENCY ROOM IF YOU HAVE ANY OF THE FOLLOWING:      Difficulty breathing              Chills and/or fever over 101 F   Persistent vomiting and/or vomiting blood   Severe abdominal pain   Severe chest pain   Black, tarry stools   Bleeding- more than one tablespoon   Any  other symptom or condition that you feel may need urgent attention  Your doctor recommends these additional instructions:  If any biopsies were taken, your doctors clinic will contact you in 1 to 2   weeks with any results.  - Patient has a contact number available for emergencies.  The signs and   symptoms of potential delayed complications were discussed with the   patient.  Return to normal activities tomorrow.  Written discharge   instructions were provided to the patient.   - Discharge patient to home (ambulatory).   - Resume regular diet indefinitely.   - Repeat colonoscopy in 3 - 5 years for surveillance.   - Continue present medications.  For questions, problems or results please call your physician - Woody Mckeon MD at Work:  [phone].  [facility], EMERGENCY ROOM PHONE NUMBER: [phone]  IF A COMPLICATION OR EMERGENCY SITUATION ARISES AND YOU ARE UNABLE TO REACH   YOUR PHYSICIAN - GO DIRECTLY TO THE EMERGENCY ROOM.  Woody Mckeon MD  6/22/2020 1:56:14 PM  This report has been verified and signed electronically.  [employer]

## 2020-06-22 NOTE — DISCHARGE INSTRUCTIONS
Colonoscopy     A camera attached to a flexible tube with a viewing lens is used to take video pictures.     Colonoscopy is a test to view the inside of your lower digestive tract (colon and rectum). Sometimes it can show the last part of the small intestine (ileum). During the test, small pieces of tissue may be removed for testing. This is called a biopsy. Small growths, such as polyps, may also be removed.   Why is colonoscopy done?  The test is done to help look for colon cancer. And it can help find the source of abdominal pain, bleeding, and changes in bowel habits. It may be needed once a year, depending on factors such as your:  · Age  · Health history  · Family health history  · Symptoms  · Results from any prior colonoscopy  Risks and possible complications  These include:  · Bleeding               · A puncture or tear in the colon   · Risks of anesthesia  · A cancer lesion not being seen  Getting ready   To prepare for the test:  · Talk with your healthcare provider about the risks of the test (see below). Also ask your healthcare provider about alternatives to the test.  · Tell your healthcare provider about any medicines you take. Also tell him or her about any health conditions you may have.  · Make sure your rectum and colon are empty for the test. Follow the diet and bowel prep instructions exactly. If you dont, the test may need to be rescheduled.  · Plan for a friend or family member to drive you home after the test.     Colonoscopy provides an inside view of the entire colon.     You may discuss the results with your doctor right away or at a future visit.  During the test   The test is usually done in the hospital on an outpatient basis. This means you go home the same day. The procedure takes about 30 minutes. During that time:  · You are given relaxing (sedating) medicine through an IV line. You may be drowsy, or fully asleep.  · The healthcare provider will first give you a physical exam to  check for anal and rectal problems.  · Then the anus is lubricated and the scope inserted.  · If you are awake, you may have a feeling similar to needing to have a bowel movement. You may also feel pressure as air is pumped into the colon. Its OK to pass gas during the procedure.  · Biopsy, polyp removal, or other treatments may be done during the test.  After the test   You may have gas right after the test. It can help to try to pass it to help prevent later bloating. Your healthcare provider may discuss the results with you right away. Or you may need to schedule a follow-up visit to talk about the results. After the test, you can go back to your normal eating and other activities. You may be tired from the sedation and need to rest for a few hours.  Date Last Reviewed: 11/1/2016 © 2000-2017 [employer]. [address] [place] [address], [place] [address]. All rights reserved. This information is not intended as a substitute for professional medical care. Always follow your healthcare professional's instructions.

## 2020-06-22 NOTE — TRANSFER OF CARE
"Anesthesia Transfer of Care Note    Patient: William Bueno    Procedure(s) Performed: Procedure(s) (LRB):  COLONOSCOPY (N/A)    Patient location: PACU    Anesthesia Type: general    Transport from OR: Transported from OR on room air with adequate spontaneous ventilation    Post pain: adequate analgesia    Post assessment: no apparent anesthetic complications    Post vital signs: stable    Level of consciousness: awake    Nausea/Vomiting: no nausea/vomiting    Complications: none    Transfer of care protocol was followed      Last vitals:   Visit Vitals  BP (!) 141/76 (BP Location: Left arm, Patient Position: Lying)   Pulse 90   Temp 37.1 °C (98.8 °F) (Temporal)   Resp 17   Ht 5' 9" (1.753 m)   Wt 72.6 kg (160 lb)   SpO2 98%   BMI 23.63 kg/m²     "

## 2020-06-22 NOTE — ANESTHESIA PREPROCEDURE EVALUATION
06/22/2020  William Bueno is a 69 y.o., male.    Anesthesia Evaluation    I have reviewed the Patient Summary Reports.      I have reviewed the Medications.     Review of Systems  Anesthesia Hx:  No problems with previous Anesthesia Denies Hx of Anesthetic complications   Denies Personal Hx of Anesthesia complications.   Hematology/Oncology:  Hematology Normal   Oncology Normal     EENT/Dental:EENT/Dental Normal   Cardiovascular:   Exercise tolerance: good Hypertension    Pulmonary:   COPD    Renal/:  Renal/ Normal     Hepatic/GI:  Hepatic/GI Normal    Musculoskeletal:  Musculoskeletal Normal    Neurological:  Neurology Normal    Endocrine:  Endocrine Normal    Dermatological:  Skin Normal    Psych:   depression          Physical Exam  General:  Well nourished    Airway/Jaw/Neck:  AIRWAY FINDINGS: Normal      Eyes/Ears/Nose:  EYES/EARS/NOSE FINDINGS: Normal   Dental:  DENTAL FINDINGS: Normal   Chest/Lungs:  Chest/Lungs Clear    Heart/Vascular:  Heart Findings: Normal Heart murmur: negative Vascular Findings: Normal    Abdomen:  Abdomen Findings: Normal    Musculoskeletal:  Musculoskeletal Findings: Normal   Skin:  Skin Findings: Normal    Mental Status:  Mental Status Findings: Normal        Anesthesia Plan  Type of Anesthesia, risks & benefits discussed:  Anesthesia Type:  general  Patient's Preference: General  Intra-op Monitoring Plan: standard ASA monitors  Intra-op Monitoring Plan Comments:   Post Op Pain Control Plan:   Post Op Pain Control Plan Comments:   Induction:   IV  Beta Blocker:  Patient is not currently on a Beta-Blocker (No further documentation required).       Informed Consent: Patient understands risks and agrees with Anesthesia plan.  Questions answered. Anesthesia consent signed with patient.  ASA Score: 2     Day of Surgery Review of History & Physical:    H&P update referred to the  surgeon.         Ready For Surgery From Anesthesia Perspective.

## 2020-06-23 NOTE — ANESTHESIA POSTPROCEDURE EVALUATION
Anesthesia Post Evaluation    Patient: William Bueno    Procedure(s) Performed: Procedure(s) (LRB):  COLONOSCOPY (N/A)    Final Anesthesia Type: general    Patient location during evaluation: PACU  Patient participation: Yes- Able to Participate  Level of consciousness: awake and alert  Post-procedure vital signs: reviewed and stable  Pain management: adequate  Airway patency: patent    PONV status at discharge: No PONV  Anesthetic complications: no      Cardiovascular status: blood pressure returned to baseline  Respiratory status: unassisted  Hydration status: euvolemic  Follow-up not needed.          Vitals Value Taken Time   /78 06/22/20 1427   Temp 36.4 °C (97.5 °F) 06/22/20 1357   Pulse 74 06/22/20 1427   Resp 16 06/22/20 1427   SpO2 98 % 06/22/20 1427         Event Time   Out of Recovery 14:35:43         Pain/Scott Score: Scott Score: 9 (6/22/2020  1:57 PM)

## 2020-06-24 LAB
FINAL PATHOLOGIC DIAGNOSIS: NORMAL
GROSS: NORMAL

## 2021-02-04 ENCOUNTER — TELEPHONE (OUTPATIENT)
Dept: INTERNAL MEDICINE | Facility: CLINIC | Age: 71
End: 2021-02-04

## 2021-02-04 DIAGNOSIS — I10 HYPERTENSION, ESSENTIAL: ICD-10-CM

## 2021-02-04 DIAGNOSIS — I70.0 AORTIC ATHEROSCLEROSIS: ICD-10-CM

## 2021-02-04 DIAGNOSIS — Z12.5 PROSTATE CANCER SCREENING: ICD-10-CM

## 2021-02-04 DIAGNOSIS — Z00.00 ANNUAL PHYSICAL EXAM: Primary | ICD-10-CM

## 2021-02-04 DIAGNOSIS — D64.9 ANEMIA, UNSPECIFIED TYPE: ICD-10-CM

## 2021-03-03 ENCOUNTER — TELEPHONE (OUTPATIENT)
Dept: INTERNAL MEDICINE | Facility: CLINIC | Age: 71
End: 2021-03-03

## 2021-03-03 DIAGNOSIS — I70.0 AORTIC ATHEROSCLEROSIS: ICD-10-CM

## 2021-03-03 DIAGNOSIS — D64.9 ANEMIA, UNSPECIFIED TYPE: ICD-10-CM

## 2021-03-03 DIAGNOSIS — Z12.5 PROSTATE CANCER SCREENING: ICD-10-CM

## 2021-03-03 DIAGNOSIS — E78.5 HYPERLIPIDEMIA, UNSPECIFIED HYPERLIPIDEMIA TYPE: ICD-10-CM

## 2021-03-03 DIAGNOSIS — I10 HYPERTENSION, ESSENTIAL: ICD-10-CM

## 2021-03-03 DIAGNOSIS — Z00.00 ANNUAL PHYSICAL EXAM: Primary | ICD-10-CM

## 2021-04-16 ENCOUNTER — LAB VISIT (OUTPATIENT)
Dept: LAB | Facility: HOSPITAL | Age: 71
End: 2021-04-16
Attending: INTERNAL MEDICINE
Payer: COMMERCIAL

## 2021-04-16 ENCOUNTER — PATIENT MESSAGE (OUTPATIENT)
Dept: RESEARCH | Facility: HOSPITAL | Age: 71
End: 2021-04-16

## 2021-04-16 DIAGNOSIS — I70.0 AORTIC ATHEROSCLEROSIS: ICD-10-CM

## 2021-04-16 DIAGNOSIS — Z00.00 ANNUAL PHYSICAL EXAM: ICD-10-CM

## 2021-04-16 DIAGNOSIS — I10 HYPERTENSION, ESSENTIAL: ICD-10-CM

## 2021-04-16 DIAGNOSIS — D64.9 ANEMIA, UNSPECIFIED TYPE: ICD-10-CM

## 2021-04-16 DIAGNOSIS — Z12.5 PROSTATE CANCER SCREENING: ICD-10-CM

## 2021-04-16 LAB
ALBUMIN SERPL BCP-MCNC: 3.6 G/DL (ref 3.5–5.2)
ALP SERPL-CCNC: 113 U/L (ref 55–135)
ALT SERPL W/O P-5'-P-CCNC: 27 U/L (ref 10–44)
ANION GAP SERPL CALC-SCNC: 8 MMOL/L (ref 8–16)
AST SERPL-CCNC: 23 U/L (ref 10–40)
BASOPHILS # BLD AUTO: 0.04 K/UL (ref 0–0.2)
BASOPHILS NFR BLD: 0.6 % (ref 0–1.9)
BILIRUB SERPL-MCNC: 0.5 MG/DL (ref 0.1–1)
BUN SERPL-MCNC: 15 MG/DL (ref 8–23)
CALCIUM SERPL-MCNC: 9.2 MG/DL (ref 8.7–10.5)
CHLORIDE SERPL-SCNC: 107 MMOL/L (ref 95–110)
CHOLEST SERPL-MCNC: 137 MG/DL (ref 120–199)
CHOLEST/HDLC SERPL: 2.4 {RATIO} (ref 2–5)
CO2 SERPL-SCNC: 26 MMOL/L (ref 23–29)
COMPLEXED PSA SERPL-MCNC: 5.6 NG/ML (ref 0–4)
CREAT SERPL-MCNC: 1.3 MG/DL (ref 0.5–1.4)
DIFFERENTIAL METHOD: ABNORMAL
EOSINOPHIL # BLD AUTO: 0.1 K/UL (ref 0–0.5)
EOSINOPHIL NFR BLD: 2.1 % (ref 0–8)
ERYTHROCYTE [DISTWIDTH] IN BLOOD BY AUTOMATED COUNT: 13.7 % (ref 11.5–14.5)
EST. GFR  (AFRICAN AMERICAN): >60 ML/MIN/1.73 M^2
EST. GFR  (NON AFRICAN AMERICAN): 55.7 ML/MIN/1.73 M^2
GLUCOSE SERPL-MCNC: 94 MG/DL (ref 70–110)
HCT VFR BLD AUTO: 39.1 % (ref 40–54)
HDLC SERPL-MCNC: 58 MG/DL (ref 40–75)
HDLC SERPL: 42.3 % (ref 20–50)
HGB BLD-MCNC: 12.9 G/DL (ref 14–18)
IMM GRANULOCYTES # BLD AUTO: 0.01 K/UL (ref 0–0.04)
IMM GRANULOCYTES NFR BLD AUTO: 0.1 % (ref 0–0.5)
LDLC SERPL CALC-MCNC: 69 MG/DL (ref 63–159)
LYMPHOCYTES # BLD AUTO: 2.7 K/UL (ref 1–4.8)
LYMPHOCYTES NFR BLD: 39.7 % (ref 18–48)
MCH RBC QN AUTO: 31.2 PG (ref 27–31)
MCHC RBC AUTO-ENTMCNC: 33 G/DL (ref 32–36)
MCV RBC AUTO: 95 FL (ref 82–98)
MONOCYTES # BLD AUTO: 0.6 K/UL (ref 0.3–1)
MONOCYTES NFR BLD: 8.2 % (ref 4–15)
NEUTROPHILS # BLD AUTO: 3.3 K/UL (ref 1.8–7.7)
NEUTROPHILS NFR BLD: 49.3 % (ref 38–73)
NONHDLC SERPL-MCNC: 79 MG/DL
NRBC BLD-RTO: 0 /100 WBC
PLATELET # BLD AUTO: 332 K/UL (ref 150–450)
PMV BLD AUTO: 9.9 FL (ref 9.2–12.9)
POTASSIUM SERPL-SCNC: 4.2 MMOL/L (ref 3.5–5.1)
PROT SERPL-MCNC: 7.3 G/DL (ref 6–8.4)
RBC # BLD AUTO: 4.13 M/UL (ref 4.6–6.2)
SODIUM SERPL-SCNC: 141 MMOL/L (ref 136–145)
TRIGL SERPL-MCNC: 50 MG/DL (ref 30–150)
WBC # BLD AUTO: 6.72 K/UL (ref 3.9–12.7)

## 2021-04-16 PROCEDURE — 85025 COMPLETE CBC W/AUTO DIFF WBC: CPT | Performed by: INTERNAL MEDICINE

## 2021-04-16 PROCEDURE — 80053 COMPREHEN METABOLIC PANEL: CPT | Performed by: INTERNAL MEDICINE

## 2021-04-16 PROCEDURE — 84153 ASSAY OF PSA TOTAL: CPT | Performed by: INTERNAL MEDICINE

## 2021-04-16 PROCEDURE — 80061 LIPID PANEL: CPT | Performed by: INTERNAL MEDICINE

## 2021-04-16 PROCEDURE — 36415 COLL VENOUS BLD VENIPUNCTURE: CPT | Performed by: INTERNAL MEDICINE

## 2021-04-21 ENCOUNTER — OFFICE VISIT (OUTPATIENT)
Dept: INTERNAL MEDICINE | Facility: CLINIC | Age: 71
End: 2021-04-21
Payer: MEDICARE

## 2021-04-21 ENCOUNTER — LAB VISIT (OUTPATIENT)
Dept: LAB | Facility: HOSPITAL | Age: 71
End: 2021-04-21
Attending: INTERNAL MEDICINE
Payer: MEDICARE

## 2021-04-21 VITALS
SYSTOLIC BLOOD PRESSURE: 124 MMHG | HEART RATE: 83 BPM | HEIGHT: 69 IN | BODY MASS INDEX: 23.28 KG/M2 | WEIGHT: 157.19 LBS | OXYGEN SATURATION: 97 % | DIASTOLIC BLOOD PRESSURE: 84 MMHG

## 2021-04-21 DIAGNOSIS — E78.5 HYPERLIPIDEMIA, UNSPECIFIED HYPERLIPIDEMIA TYPE: ICD-10-CM

## 2021-04-21 DIAGNOSIS — N40.0 BENIGN PROSTATIC HYPERPLASIA, UNSPECIFIED WHETHER LOWER URINARY TRACT SYMPTOMS PRESENT: ICD-10-CM

## 2021-04-21 DIAGNOSIS — Z00.00 ANNUAL PHYSICAL EXAM: Primary | ICD-10-CM

## 2021-04-21 DIAGNOSIS — R53.83 FATIGUE, UNSPECIFIED TYPE: ICD-10-CM

## 2021-04-21 DIAGNOSIS — I70.0 AORTIC ATHEROSCLEROSIS: ICD-10-CM

## 2021-04-21 DIAGNOSIS — I10 HYPERTENSION, ESSENTIAL: ICD-10-CM

## 2021-04-21 DIAGNOSIS — R06.00 DYSPNEA, UNSPECIFIED TYPE: ICD-10-CM

## 2021-04-21 DIAGNOSIS — D64.9 CHRONIC ANEMIA: ICD-10-CM

## 2021-04-21 DIAGNOSIS — Z01.818 PRE-PROCEDURAL EXAMINATION: ICD-10-CM

## 2021-04-21 DIAGNOSIS — F17.200 TOBACCO DEPENDENCY: ICD-10-CM

## 2021-04-21 DIAGNOSIS — R97.20 PSA ELEVATION: ICD-10-CM

## 2021-04-21 DIAGNOSIS — Z12.2 ENCOUNTER FOR SCREENING FOR MALIGNANT NEOPLASM OF RESPIRATORY ORGANS: ICD-10-CM

## 2021-04-21 LAB
FERRITIN SERPL-MCNC: 195 NG/ML (ref 20–300)
IRON SERPL-MCNC: 93 UG/DL (ref 45–160)
SATURATED IRON: 25 % (ref 20–50)
TOTAL IRON BINDING CAPACITY: 367 UG/DL (ref 250–450)
TRANSFERRIN SERPL-MCNC: 248 MG/DL (ref 200–375)
TSH SERPL DL<=0.005 MIU/L-ACNC: 0.57 UIU/ML (ref 0.4–4)

## 2021-04-21 PROCEDURE — 99999 PR PBB SHADOW E&M-EST. PATIENT-LVL IV: CPT | Mod: PBBFAC,,, | Performed by: INTERNAL MEDICINE

## 2021-04-21 PROCEDURE — 99397 PER PM REEVAL EST PAT 65+ YR: CPT | Mod: S$GLB,,, | Performed by: INTERNAL MEDICINE

## 2021-04-21 PROCEDURE — 84443 ASSAY THYROID STIM HORMONE: CPT | Performed by: INTERNAL MEDICINE

## 2021-04-21 PROCEDURE — 99397 PR PREVENTIVE VISIT,EST,65 & OVER: ICD-10-PCS | Mod: S$GLB,,, | Performed by: INTERNAL MEDICINE

## 2021-04-21 PROCEDURE — 99999 PR PBB SHADOW E&M-EST. PATIENT-LVL IV: ICD-10-PCS | Mod: PBBFAC,,, | Performed by: INTERNAL MEDICINE

## 2021-04-21 PROCEDURE — 36415 COLL VENOUS BLD VENIPUNCTURE: CPT | Performed by: INTERNAL MEDICINE

## 2021-04-21 PROCEDURE — 82728 ASSAY OF FERRITIN: CPT | Performed by: INTERNAL MEDICINE

## 2021-04-21 PROCEDURE — 83540 ASSAY OF IRON: CPT | Performed by: INTERNAL MEDICINE

## 2021-04-21 RX ORDER — FELODIPINE 5 MG/1
5 TABLET, EXTENDED RELEASE ORAL DAILY
Qty: 90 TABLET | Refills: 3 | Status: SHIPPED | OUTPATIENT
Start: 2021-04-21 | End: 2021-05-24 | Stop reason: SDUPTHER

## 2021-04-21 RX ORDER — ATORVASTATIN CALCIUM 20 MG/1
20 TABLET, FILM COATED ORAL DAILY
Qty: 90 TABLET | Refills: 3 | Status: SHIPPED | OUTPATIENT
Start: 2021-04-21 | End: 2021-05-24 | Stop reason: SDUPTHER

## 2021-04-21 RX ORDER — VIT C/E/ZN/COPPR/LUTEIN/ZEAXAN 250MG-90MG
CAPSULE ORAL
Qty: 1 CAPSULE | Refills: 0
Start: 2021-04-21 | End: 2021-11-24

## 2021-04-26 ENCOUNTER — TELEPHONE (OUTPATIENT)
Dept: INTERNAL MEDICINE | Facility: CLINIC | Age: 71
End: 2021-04-26

## 2021-04-30 ENCOUNTER — HOSPITAL ENCOUNTER (OUTPATIENT)
Dept: RADIOLOGY | Facility: HOSPITAL | Age: 71
Discharge: HOME | End: 2021-04-30
Attending: INTERNAL MEDICINE
Payer: MEDICARE

## 2021-04-30 ENCOUNTER — LAB VISIT (OUTPATIENT)
Dept: INTERNAL MEDICINE | Facility: CLINIC | Age: 71
End: 2021-04-30
Payer: MEDICARE

## 2021-04-30 DIAGNOSIS — Z01.818 PRE-PROCEDURAL EXAMINATION: ICD-10-CM

## 2021-04-30 DIAGNOSIS — Z12.2 ENCOUNTER FOR SCREENING FOR MALIGNANT NEOPLASM OF RESPIRATORY ORGANS: ICD-10-CM

## 2021-04-30 PROCEDURE — 71271 CT THORAX LUNG CANCER SCR C-: CPT | Mod: TC

## 2021-04-30 PROCEDURE — U0003 INFECTIOUS AGENT DETECTION BY NUCLEIC ACID (DNA OR RNA); SEVERE ACUTE RESPIRATORY SYNDROME CORONAVIRUS 2 (SARS-COV-2) (CORONAVIRUS DISEASE [COVID-19]), AMPLIFIED PROBE TECHNIQUE, MAKING USE OF HIGH THROUGHPUT TECHNOLOGIES AS DESCRIBED BY CMS-2020-01-R: HCPCS | Performed by: INTERNAL MEDICINE

## 2021-04-30 PROCEDURE — 71271 CT CHEST LUNG SCREENING LOW DOSE: ICD-10-PCS | Mod: 26,,, | Performed by: RADIOLOGY

## 2021-04-30 PROCEDURE — 71271 CT THORAX LUNG CANCER SCR C-: CPT | Mod: 26,,, | Performed by: RADIOLOGY

## 2021-04-30 PROCEDURE — U0005 INFEC AGEN DETEC AMPLI PROBE: HCPCS | Performed by: INTERNAL MEDICINE

## 2021-05-01 LAB — SARS-COV-2 RNA RESP QL NAA+PROBE: NOT DETECTED

## 2021-05-03 ENCOUNTER — OFFICE VISIT (OUTPATIENT)
Dept: UROLOGY | Facility: CLINIC | Age: 71
End: 2021-05-03
Payer: MEDICARE

## 2021-05-03 ENCOUNTER — HOSPITAL ENCOUNTER (OUTPATIENT)
Dept: PULMONOLOGY | Facility: CLINIC | Age: 71
Discharge: HOME | End: 2021-05-03
Payer: MEDICARE

## 2021-05-03 ENCOUNTER — TELEPHONE (OUTPATIENT)
Dept: SMOKING CESSATION | Facility: CLINIC | Age: 71
End: 2021-05-03

## 2021-05-03 VITALS
DIASTOLIC BLOOD PRESSURE: 88 MMHG | HEIGHT: 68 IN | HEART RATE: 81 BPM | BODY MASS INDEX: 23.34 KG/M2 | SYSTOLIC BLOOD PRESSURE: 136 MMHG | WEIGHT: 154 LBS

## 2021-05-03 DIAGNOSIS — R06.00 DYSPNEA, UNSPECIFIED TYPE: ICD-10-CM

## 2021-05-03 DIAGNOSIS — F17.200 TOBACCO DEPENDENCY: ICD-10-CM

## 2021-05-03 DIAGNOSIS — N40.0 BENIGN PROSTATIC HYPERPLASIA, UNSPECIFIED WHETHER LOWER URINARY TRACT SYMPTOMS PRESENT: ICD-10-CM

## 2021-05-03 DIAGNOSIS — R97.20 PSA ELEVATION: ICD-10-CM

## 2021-05-03 LAB
FEF 25 75 LLN: 0.75
FEF 25 75 PRE REF: 79.5 %
FEF 25 75 REF: 2.06
FET100 CHG: -0.8 %
FEV05 LLN: 1.35
FEV05 REF: 2.48
FEV1 CHG: 11.6 %
FEV1 FVC LLN: 64
FEV1 FVC PRE REF: 86 %
FEV1 FVC REF: 77
FEV1 LLN: 1.8
FEV1 PRE REF: 112.2 %
FEV1 REF: 2.6
FEV1 VOL CHG: 0.34
FVC CHG: 9.4 %
FVC LLN: 2.44
FVC PRE REF: 130.2 %
FVC REF: 3.38
FVC VOL CHG: 0.41
PEF LLN: 5.08
PEF PRE REF: 89.8 %
PEF REF: 7.59
PHYSICIAN COMMENT: ABNORMAL
POST FEF 25 75: 1.9 L/S (ref 0.75–3.37)
POST FET 100: 5.82 SEC
POST FEV1 FVC: 67.6 % (ref 64.09–89.93)
POST FEV1: 3.25 L (ref 1.8–3.4)
POST FEV5: 2.59 L (ref 1.35–3.62)
POST FVC: 4.82 L (ref 2.44–4.32)
POST PEF: 7.71 L/S (ref 5.08–10.1)
PRE FEF 25 75: 1.64 L/S (ref 0.75–3.37)
PRE FET 100: 5.87 SEC
PRE FEV05 REF: 92.6 %
PRE FEV1 FVC: 66.27 % (ref 64.09–89.93)
PRE FEV1: 2.92 L (ref 1.8–3.4)
PRE FEV5: 2.3 L (ref 1.35–3.62)
PRE FVC: 4.4 L (ref 2.44–4.32)
PRE PEF: 6.82 L/S (ref 5.08–10.1)

## 2021-05-03 PROCEDURE — 99204 PR OFFICE/OUTPT VISIT, NEW, LEVL IV, 45-59 MIN: ICD-10-PCS | Mod: S$GLB,,, | Performed by: UROLOGY

## 2021-05-03 PROCEDURE — 99999 PR PBB SHADOW E&M-EST. PATIENT-LVL III: CPT | Mod: PBBFAC,,, | Performed by: UROLOGY

## 2021-05-03 PROCEDURE — 94060 PR EVAL OF BRONCHOSPASM: ICD-10-PCS | Mod: S$GLB,,, | Performed by: INTERNAL MEDICINE

## 2021-05-03 PROCEDURE — 94060 EVALUATION OF WHEEZING: CPT | Mod: S$GLB,,, | Performed by: INTERNAL MEDICINE

## 2021-05-03 PROCEDURE — 99999 PR PBB SHADOW E&M-EST. PATIENT-LVL III: ICD-10-PCS | Mod: PBBFAC,,, | Performed by: UROLOGY

## 2021-05-03 PROCEDURE — 99204 OFFICE O/P NEW MOD 45 MIN: CPT | Mod: S$GLB,,, | Performed by: UROLOGY

## 2021-05-24 ENCOUNTER — TELEPHONE (OUTPATIENT)
Dept: SMOKING CESSATION | Facility: CLINIC | Age: 71
End: 2021-05-24

## 2021-05-24 ENCOUNTER — OFFICE VISIT (OUTPATIENT)
Dept: INTERNAL MEDICINE | Facility: CLINIC | Age: 71
End: 2021-05-24
Payer: MEDICARE

## 2021-05-24 VITALS
HEIGHT: 69 IN | SYSTOLIC BLOOD PRESSURE: 124 MMHG | OXYGEN SATURATION: 97 % | BODY MASS INDEX: 23.11 KG/M2 | DIASTOLIC BLOOD PRESSURE: 82 MMHG | WEIGHT: 156 LBS | HEART RATE: 79 BPM

## 2021-05-24 DIAGNOSIS — F17.200 TOBACCO DEPENDENCY: ICD-10-CM

## 2021-05-24 DIAGNOSIS — D64.9 CHRONIC ANEMIA: ICD-10-CM

## 2021-05-24 DIAGNOSIS — R91.1 LUNG NODULE, SOLITARY: Primary | ICD-10-CM

## 2021-05-24 DIAGNOSIS — I10 HYPERTENSION, ESSENTIAL: ICD-10-CM

## 2021-05-24 DIAGNOSIS — I70.0 AORTIC ATHEROSCLEROSIS: ICD-10-CM

## 2021-05-24 DIAGNOSIS — R97.20 PSA ELEVATION: ICD-10-CM

## 2021-05-24 PROCEDURE — 99213 OFFICE O/P EST LOW 20 MIN: CPT | Mod: S$GLB,,, | Performed by: INTERNAL MEDICINE

## 2021-05-24 PROCEDURE — 99999 PR PBB SHADOW E&M-EST. PATIENT-LVL III: ICD-10-PCS | Mod: PBBFAC,,, | Performed by: INTERNAL MEDICINE

## 2021-05-24 PROCEDURE — 99999 PR PBB SHADOW E&M-EST. PATIENT-LVL III: CPT | Mod: PBBFAC,,, | Performed by: INTERNAL MEDICINE

## 2021-05-24 PROCEDURE — 99213 PR OFFICE/OUTPT VISIT, EST, LEVL III, 20-29 MIN: ICD-10-PCS | Mod: S$GLB,,, | Performed by: INTERNAL MEDICINE

## 2021-05-24 RX ORDER — ATORVASTATIN CALCIUM 20 MG/1
20 TABLET, FILM COATED ORAL DAILY
Qty: 90 TABLET | Refills: 3 | Status: SHIPPED | OUTPATIENT
Start: 2021-05-24 | End: 2021-09-14 | Stop reason: SDUPTHER

## 2021-05-24 RX ORDER — FELODIPINE 5 MG/1
5 TABLET, EXTENDED RELEASE ORAL DAILY
Qty: 90 TABLET | Refills: 3 | Status: SHIPPED | OUTPATIENT
Start: 2021-05-24 | End: 2021-09-14 | Stop reason: SDUPTHER

## 2021-06-14 ENCOUNTER — LAB VISIT (OUTPATIENT)
Dept: LAB | Facility: HOSPITAL | Age: 71
End: 2021-06-14
Attending: UROLOGY
Payer: MEDICARE

## 2021-06-14 DIAGNOSIS — R97.20 PSA ELEVATION: ICD-10-CM

## 2021-06-14 LAB — COMPLEXED PSA SERPL-MCNC: 4.8 NG/ML (ref 0–4)

## 2021-06-14 PROCEDURE — 36415 COLL VENOUS BLD VENIPUNCTURE: CPT | Performed by: UROLOGY

## 2021-06-14 PROCEDURE — 84153 ASSAY OF PSA TOTAL: CPT | Performed by: UROLOGY

## 2021-06-17 DIAGNOSIS — R97.20 PSA ELEVATION: Primary | ICD-10-CM

## 2021-06-22 ENCOUNTER — TELEPHONE (OUTPATIENT)
Dept: UROLOGY | Facility: CLINIC | Age: 71
End: 2021-06-22

## 2021-06-30 ENCOUNTER — TELEPHONE (OUTPATIENT)
Dept: UROLOGY | Facility: CLINIC | Age: 71
End: 2021-06-30

## 2021-07-01 ENCOUNTER — TELEPHONE (OUTPATIENT)
Dept: UROLOGY | Facility: CLINIC | Age: 71
End: 2021-07-01

## 2021-07-12 ENCOUNTER — TELEPHONE (OUTPATIENT)
Dept: UROLOGY | Facility: CLINIC | Age: 71
End: 2021-07-12

## 2021-07-14 ENCOUNTER — TELEPHONE (OUTPATIENT)
Dept: UROLOGY | Facility: CLINIC | Age: 71
End: 2021-07-14

## 2021-08-19 ENCOUNTER — HOSPITAL ENCOUNTER (OUTPATIENT)
Dept: RADIOLOGY | Facility: HOSPITAL | Age: 71
Discharge: HOME | End: 2021-08-19
Attending: INTERNAL MEDICINE
Payer: MEDICARE

## 2021-08-19 DIAGNOSIS — R91.1 SOLITARY PULMONARY NODULE: ICD-10-CM

## 2021-08-19 PROCEDURE — 71250 CT THORAX DX C-: CPT | Mod: TC

## 2021-08-19 PROCEDURE — 71250 CT CHEST WITHOUT CONTRAST: ICD-10-PCS | Mod: 26,,, | Performed by: INTERNAL MEDICINE

## 2021-08-19 PROCEDURE — 71250 CT THORAX DX C-: CPT | Mod: 26,,, | Performed by: INTERNAL MEDICINE

## 2021-08-22 DIAGNOSIS — R91.1 SOLITARY PULMONARY NODULE: ICD-10-CM

## 2021-08-23 ENCOUNTER — TELEPHONE (OUTPATIENT)
Dept: INTERNAL MEDICINE | Facility: CLINIC | Age: 71
End: 2021-08-23

## 2021-09-08 ENCOUNTER — TELEPHONE (OUTPATIENT)
Dept: INTERNAL MEDICINE | Facility: CLINIC | Age: 71
End: 2021-09-08

## 2021-09-13 ENCOUNTER — TELEPHONE (OUTPATIENT)
Dept: INTERNAL MEDICINE | Facility: CLINIC | Age: 71
End: 2021-09-13

## 2021-09-14 DIAGNOSIS — I70.0 AORTIC ATHEROSCLEROSIS: ICD-10-CM

## 2021-09-14 RX ORDER — FELODIPINE 5 MG/1
5 TABLET, EXTENDED RELEASE ORAL DAILY
Qty: 30 TABLET | Refills: 0 | Status: SHIPPED | OUTPATIENT
Start: 2021-09-14 | End: 2022-04-28 | Stop reason: SDUPTHER

## 2021-09-14 RX ORDER — ATORVASTATIN CALCIUM 20 MG/1
20 TABLET, FILM COATED ORAL DAILY
Qty: 30 TABLET | Refills: 0 | Status: SHIPPED | OUTPATIENT
Start: 2021-09-14 | End: 2021-10-19 | Stop reason: SDUPTHER

## 2021-09-21 ENCOUNTER — TELEPHONE (OUTPATIENT)
Dept: UROLOGY | Facility: CLINIC | Age: 71
End: 2021-09-21

## 2021-09-28 ENCOUNTER — OFFICE VISIT (OUTPATIENT)
Dept: UROLOGY | Facility: CLINIC | Age: 71
End: 2021-09-28
Payer: MEDICARE

## 2021-09-28 VITALS
BODY MASS INDEX: 22.66 KG/M2 | WEIGHT: 153 LBS | HEART RATE: 108 BPM | DIASTOLIC BLOOD PRESSURE: 82 MMHG | SYSTOLIC BLOOD PRESSURE: 128 MMHG | HEIGHT: 69 IN

## 2021-09-28 DIAGNOSIS — R97.20 ELEVATED PSA: ICD-10-CM

## 2021-09-28 DIAGNOSIS — N40.1 BENIGN NON-NODULAR PROSTATIC HYPERPLASIA WITH LOWER URINARY TRACT SYMPTOMS: Primary | ICD-10-CM

## 2021-09-28 PROCEDURE — 99214 OFFICE O/P EST MOD 30 MIN: CPT | Mod: S$GLB,,, | Performed by: UROLOGY

## 2021-09-28 PROCEDURE — 99214 PR OFFICE/OUTPT VISIT, EST, LEVL IV, 30-39 MIN: ICD-10-PCS | Mod: S$GLB,,, | Performed by: UROLOGY

## 2021-09-28 PROCEDURE — 99999 PR PBB SHADOW E&M-EST. PATIENT-LVL III: ICD-10-PCS | Mod: PBBFAC,,, | Performed by: UROLOGY

## 2021-09-28 PROCEDURE — 99999 PR PBB SHADOW E&M-EST. PATIENT-LVL III: CPT | Mod: PBBFAC,,, | Performed by: UROLOGY

## 2021-09-28 PROCEDURE — 87086 URINE CULTURE/COLONY COUNT: CPT | Performed by: UROLOGY

## 2021-09-28 RX ORDER — TAMSULOSIN HYDROCHLORIDE 0.4 MG/1
0.4 CAPSULE ORAL
Qty: 30 CAPSULE | Refills: 12 | Status: SHIPPED | OUTPATIENT
Start: 2021-09-28 | End: 2022-05-09

## 2021-09-28 RX ORDER — AMLODIPINE BESYLATE 5 MG/1
5 TABLET ORAL DAILY
COMMUNITY
Start: 2021-09-14 | End: 2021-10-19

## 2021-09-30 LAB — BACTERIA UR CULT: NO GROWTH

## 2021-10-05 ENCOUNTER — TELEPHONE (OUTPATIENT)
Dept: INTERNAL MEDICINE | Facility: CLINIC | Age: 71
End: 2021-10-05
Payer: MEDICARE

## 2021-10-07 ENCOUNTER — TELEPHONE (OUTPATIENT)
Dept: INTERNAL MEDICINE | Facility: CLINIC | Age: 71
End: 2021-10-07
Payer: MEDICARE

## 2021-10-09 ENCOUNTER — PES CALL (OUTPATIENT)
Dept: ADMINISTRATIVE | Facility: CLINIC | Age: 71
End: 2021-10-09

## 2021-10-14 ENCOUNTER — TELEPHONE (OUTPATIENT)
Dept: INTERNAL MEDICINE | Facility: CLINIC | Age: 71
End: 2021-10-14

## 2021-10-19 ENCOUNTER — HOSPITAL ENCOUNTER (OUTPATIENT)
Dept: RADIOLOGY | Facility: HOSPITAL | Age: 71
Discharge: HOME | End: 2021-10-19
Attending: INTERNAL MEDICINE
Payer: MEDICARE

## 2021-10-19 ENCOUNTER — HOSPITAL ENCOUNTER (OUTPATIENT)
Dept: PULMONOLOGY | Facility: CLINIC | Age: 71
Discharge: HOME | End: 2021-10-19
Payer: MEDICARE

## 2021-10-19 ENCOUNTER — OFFICE VISIT (OUTPATIENT)
Dept: INTERNAL MEDICINE | Facility: CLINIC | Age: 71
End: 2021-10-19
Payer: MEDICARE

## 2021-10-19 VITALS
HEART RATE: 110 BPM | BODY MASS INDEX: 23.11 KG/M2 | OXYGEN SATURATION: 95 % | HEIGHT: 69 IN | WEIGHT: 156 LBS | SYSTOLIC BLOOD PRESSURE: 114 MMHG | DIASTOLIC BLOOD PRESSURE: 68 MMHG

## 2021-10-19 VITALS — WEIGHT: 156 LBS | BODY MASS INDEX: 23.11 KG/M2 | HEIGHT: 69 IN

## 2021-10-19 DIAGNOSIS — J44.9 CHRONIC OBSTRUCTIVE PULMONARY DISEASE, UNSPECIFIED COPD TYPE: ICD-10-CM

## 2021-10-19 DIAGNOSIS — I10 ESSENTIAL HYPERTENSION: ICD-10-CM

## 2021-10-19 DIAGNOSIS — R06.00 DYSPNEA, UNSPECIFIED TYPE: ICD-10-CM

## 2021-10-19 DIAGNOSIS — D64.9 ANEMIA, UNSPECIFIED TYPE: ICD-10-CM

## 2021-10-19 DIAGNOSIS — I70.0 AORTIC ATHEROSCLEROSIS: ICD-10-CM

## 2021-10-19 DIAGNOSIS — Z86.16 HISTORY OF COVID-19: ICD-10-CM

## 2021-10-19 DIAGNOSIS — R00.0 TACHYCARDIA: ICD-10-CM

## 2021-10-19 DIAGNOSIS — Z86.16 HISTORY OF COVID-19: Primary | ICD-10-CM

## 2021-10-19 DIAGNOSIS — Z87.891 HISTORY OF CIGAR SMOKING: ICD-10-CM

## 2021-10-19 DIAGNOSIS — R53.83 FATIGUE, UNSPECIFIED TYPE: ICD-10-CM

## 2021-10-19 DIAGNOSIS — R05.3 CHRONIC COUGH: ICD-10-CM

## 2021-10-19 PROCEDURE — 71046 X-RAY EXAM CHEST 2 VIEWS: CPT | Mod: 26,,, | Performed by: RADIOLOGY

## 2021-10-19 PROCEDURE — 94618 PULMONARY STRESS TESTING: CPT | Mod: S$GLB,,, | Performed by: INTERNAL MEDICINE

## 2021-10-19 PROCEDURE — 99999 PR PBB SHADOW E&M-EST. PATIENT-LVL III: ICD-10-PCS | Mod: PBBFAC,,, | Performed by: INTERNAL MEDICINE

## 2021-10-19 PROCEDURE — 71046 X-RAY EXAM CHEST 2 VIEWS: CPT | Mod: TC

## 2021-10-19 PROCEDURE — 99214 PR OFFICE/OUTPT VISIT, EST, LEVL IV, 30-39 MIN: ICD-10-PCS | Mod: S$GLB,,, | Performed by: INTERNAL MEDICINE

## 2021-10-19 PROCEDURE — 71046 XR CHEST PA AND LATERAL: ICD-10-PCS | Mod: 26,,, | Performed by: RADIOLOGY

## 2021-10-19 PROCEDURE — 99999 PR PBB SHADOW E&M-EST. PATIENT-LVL III: CPT | Mod: PBBFAC,,, | Performed by: INTERNAL MEDICINE

## 2021-10-19 PROCEDURE — 99214 OFFICE O/P EST MOD 30 MIN: CPT | Mod: S$GLB,,, | Performed by: INTERNAL MEDICINE

## 2021-10-19 PROCEDURE — 94618 PULMONARY STRESS TESTING: ICD-10-PCS | Mod: S$GLB,,, | Performed by: INTERNAL MEDICINE

## 2021-10-19 RX ORDER — ATORVASTATIN CALCIUM 20 MG/1
20 TABLET, FILM COATED ORAL DAILY
Qty: 90 TABLET | Refills: 3 | Status: SHIPPED | OUTPATIENT
Start: 2021-10-19 | End: 2023-09-24 | Stop reason: SDUPTHER

## 2021-10-21 DIAGNOSIS — D64.9 ANEMIA, UNSPECIFIED TYPE: Primary | ICD-10-CM

## 2021-10-21 DIAGNOSIS — R93.89 ABNORMAL CHEST X-RAY: ICD-10-CM

## 2021-10-21 DIAGNOSIS — I10 ESSENTIAL HYPERTENSION: ICD-10-CM

## 2021-10-22 ENCOUNTER — TELEPHONE (OUTPATIENT)
Dept: INTERNAL MEDICINE | Facility: CLINIC | Age: 71
End: 2021-10-22
Payer: MEDICARE

## 2021-10-22 DIAGNOSIS — J96.90 RESPIRATORY FAILURE, UNSPECIFIED CHRONICITY, UNSPECIFIED WHETHER WITH HYPOXIA OR HYPERCAPNIA: Primary | ICD-10-CM

## 2021-10-26 ENCOUNTER — PATIENT OUTREACH (OUTPATIENT)
Dept: ADMINISTRATIVE | Facility: OTHER | Age: 71
End: 2021-10-26
Payer: MEDICARE

## 2021-10-27 ENCOUNTER — OFFICE VISIT (OUTPATIENT)
Dept: PULMONOLOGY | Facility: CLINIC | Age: 71
End: 2021-10-27
Payer: MEDICARE

## 2021-10-27 ENCOUNTER — HOSPITAL ENCOUNTER (OUTPATIENT)
Dept: PULMONOLOGY | Facility: CLINIC | Age: 71
Discharge: HOME | End: 2021-10-27
Payer: MEDICARE

## 2021-10-27 VITALS
WEIGHT: 158.31 LBS | DIASTOLIC BLOOD PRESSURE: 78 MMHG | SYSTOLIC BLOOD PRESSURE: 110 MMHG | OXYGEN SATURATION: 93 % | HEART RATE: 117 BPM | HEIGHT: 69 IN | BODY MASS INDEX: 23.45 KG/M2

## 2021-10-27 VITALS — BODY MASS INDEX: 23.45 KG/M2 | WEIGHT: 158.31 LBS | HEIGHT: 69 IN

## 2021-10-27 DIAGNOSIS — J43.2 CENTRILOBULAR EMPHYSEMA: ICD-10-CM

## 2021-10-27 DIAGNOSIS — Z86.16 HISTORY OF COVID-19: ICD-10-CM

## 2021-10-27 DIAGNOSIS — J44.9 CHRONIC OBSTRUCTIVE PULMONARY DISEASE, UNSPECIFIED COPD TYPE: ICD-10-CM

## 2021-10-27 DIAGNOSIS — J96.21 ACUTE ON CHRONIC RESPIRATORY FAILURE WITH HYPOXEMIA: ICD-10-CM

## 2021-10-27 DIAGNOSIS — R05.3 CHRONIC COUGH: ICD-10-CM

## 2021-10-27 DIAGNOSIS — J44.9 CHRONIC OBSTRUCTIVE PULMONARY DISEASE, UNSPECIFIED COPD TYPE: Primary | ICD-10-CM

## 2021-10-27 PROCEDURE — 99999 PR PBB SHADOW E&M-EST. PATIENT-LVL IV: CPT | Mod: PBBFAC,,, | Performed by: INTERNAL MEDICINE

## 2021-10-27 PROCEDURE — 99999 PR PBB SHADOW E&M-EST. PATIENT-LVL IV: ICD-10-PCS | Mod: PBBFAC,,, | Performed by: INTERNAL MEDICINE

## 2021-10-27 PROCEDURE — 94618 PULMONARY STRESS TESTING: ICD-10-PCS | Mod: S$GLB,,, | Performed by: INTERNAL MEDICINE

## 2021-10-27 PROCEDURE — 99204 OFFICE O/P NEW MOD 45 MIN: CPT | Mod: 25,S$GLB,, | Performed by: INTERNAL MEDICINE

## 2021-10-27 PROCEDURE — 99204 PR OFFICE/OUTPT VISIT, NEW, LEVL IV, 45-59 MIN: ICD-10-PCS | Mod: 25,S$GLB,, | Performed by: INTERNAL MEDICINE

## 2021-10-27 PROCEDURE — 94618 PULMONARY STRESS TESTING: CPT | Mod: S$GLB,,, | Performed by: INTERNAL MEDICINE

## 2021-10-27 RX ORDER — AZITHROMYCIN 250 MG/1
TABLET, FILM COATED ORAL
Qty: 1 TABLET | Refills: 0 | Status: SHIPPED | OUTPATIENT
Start: 2021-10-27 | End: 2021-11-24 | Stop reason: ALTCHOICE

## 2021-10-27 RX ORDER — FLUTICASONE FUROATE AND VILANTEROL TRIFENATATE 200; 25 UG/1; UG/1
1 POWDER RESPIRATORY (INHALATION) DAILY
Qty: 1 EACH | Refills: 5 | Status: SHIPPED | OUTPATIENT
Start: 2021-10-27 | End: 2022-11-15

## 2021-10-27 RX ORDER — PREDNISONE 20 MG/1
40 TABLET ORAL DAILY
Qty: 10 TABLET | Refills: 0 | Status: SHIPPED | OUTPATIENT
Start: 2021-10-27 | End: 2021-11-01

## 2021-10-29 ENCOUNTER — IMMUNIZATION (OUTPATIENT)
Dept: PHARMACY | Facility: CLINIC | Age: 71
End: 2021-10-29
Payer: MEDICARE

## 2021-11-05 ENCOUNTER — HOSPITAL ENCOUNTER (OUTPATIENT)
Dept: PULMONOLOGY | Facility: OTHER | Age: 71
Discharge: HOME | End: 2021-11-05
Attending: INTERNAL MEDICINE
Payer: MEDICARE

## 2021-11-05 VITALS — HEIGHT: 69 IN | WEIGHT: 158 LBS | BODY MASS INDEX: 23.4 KG/M2

## 2021-11-05 DIAGNOSIS — R05.3 CHRONIC COUGH: ICD-10-CM

## 2021-11-05 DIAGNOSIS — J44.9 CHRONIC OBSTRUCTIVE PULMONARY DISEASE, UNSPECIFIED COPD TYPE: ICD-10-CM

## 2021-11-05 DIAGNOSIS — J96.21 ACUTE ON CHRONIC RESPIRATORY FAILURE WITH HYPOXEMIA: ICD-10-CM

## 2021-11-05 DIAGNOSIS — J44.9 COPD (CHRONIC OBSTRUCTIVE PULMONARY DISEASE): Primary | ICD-10-CM

## 2021-11-05 PROCEDURE — 94618 PULMONARY STRESS TESTING: CPT

## 2021-11-24 ENCOUNTER — OFFICE VISIT (OUTPATIENT)
Dept: HOME HEALTH SERVICES | Facility: CLINIC | Age: 71
End: 2021-11-24
Payer: MEDICARE

## 2021-11-24 VITALS
BODY MASS INDEX: 24.14 KG/M2 | HEART RATE: 80 BPM | TEMPERATURE: 98 F | WEIGHT: 163 LBS | OXYGEN SATURATION: 95 % | SYSTOLIC BLOOD PRESSURE: 136 MMHG | HEIGHT: 69 IN | DIASTOLIC BLOOD PRESSURE: 90 MMHG

## 2021-11-24 DIAGNOSIS — Z59.9 FINANCIAL DIFFICULTIES: ICD-10-CM

## 2021-11-24 DIAGNOSIS — Z00.00 ENCOUNTER FOR PREVENTIVE HEALTH EXAMINATION: Primary | ICD-10-CM

## 2021-11-24 DIAGNOSIS — J96.01 ACUTE RESPIRATORY FAILURE WITH HYPOXIA: ICD-10-CM

## 2021-11-24 DIAGNOSIS — Z86.16 HISTORY OF COVID-19: ICD-10-CM

## 2021-11-24 DIAGNOSIS — Z74.09 OTHER REDUCED MOBILITY: ICD-10-CM

## 2021-11-24 DIAGNOSIS — I10 ESSENTIAL HYPERTENSION: ICD-10-CM

## 2021-11-24 DIAGNOSIS — F32.5 MAJOR DEPRESSIVE DISORDER WITH SINGLE EPISODE, IN FULL REMISSION: ICD-10-CM

## 2021-11-24 DIAGNOSIS — I70.0 AORTIC ATHEROSCLEROSIS: ICD-10-CM

## 2021-11-24 DIAGNOSIS — J44.9 CHRONIC OBSTRUCTIVE PULMONARY DISEASE, UNSPECIFIED COPD TYPE: ICD-10-CM

## 2021-11-24 DIAGNOSIS — Z99.81 DEPENDENCE ON SUPPLEMENTAL OXYGEN: ICD-10-CM

## 2021-11-24 PROCEDURE — G0439 PPPS, SUBSEQ VISIT: HCPCS | Mod: S$GLB,,, | Performed by: NURSE PRACTITIONER

## 2021-11-24 PROCEDURE — G0439 PR MEDICARE ANNUAL WELLNESS SUBSEQUENT VISIT: ICD-10-PCS | Mod: S$GLB,,, | Performed by: NURSE PRACTITIONER

## 2021-11-24 SDOH — SOCIAL DETERMINANTS OF HEALTH (SDOH): PROBLEM RELATED TO HOUSING AND ECONOMIC CIRCUMSTANCES, UNSPECIFIED: Z59.9

## 2021-11-26 PROBLEM — J96.01 ACUTE RESPIRATORY FAILURE WITH HYPOXIA: Status: ACTIVE | Noted: 2021-11-26

## 2021-11-26 PROBLEM — F17.200 TOBACCO DEPENDENCY: Status: RESOLVED | Noted: 2019-01-23 | Resolved: 2021-11-26

## 2021-12-12 ENCOUNTER — TELEPHONE (OUTPATIENT)
Dept: INTERNAL MEDICINE | Facility: CLINIC | Age: 71
End: 2021-12-12
Payer: MEDICARE

## 2021-12-12 DIAGNOSIS — R26.9 GAIT ABNORMALITY: Primary | ICD-10-CM

## 2022-02-18 ENCOUNTER — TELEPHONE (OUTPATIENT)
Dept: INTERNAL MEDICINE | Facility: CLINIC | Age: 72
End: 2022-02-18
Payer: COMMERCIAL

## 2022-02-18 NOTE — TELEPHONE ENCOUNTER
Pls r/s to ct at location below:    The auth for CT CHEST WITHOUT CONTRAST will be denied due to the pt's insurance ([facility]) being out of network with the facility the pt is scheduled. I'm not sure if you are aware but [facility] is only in network with [facility], [facility], and [facility] when it comes to Outpatient Radiology Imaging.  Please schedule the pt at an in network facility.

## 2022-02-24 ENCOUNTER — HOSPITAL ENCOUNTER (OUTPATIENT)
Dept: RADIOLOGY | Facility: OTHER | Age: 72
Discharge: HOME | End: 2022-02-24
Attending: INTERNAL MEDICINE
Payer: COMMERCIAL

## 2022-02-24 PROCEDURE — 71250 CT THORAX DX C-: CPT | Mod: TC

## 2022-02-24 PROCEDURE — 71250 CT THORAX DX C-: CPT | Mod: 26,,, | Performed by: RADIOLOGY

## 2022-02-24 PROCEDURE — 71250 CT CHEST WITHOUT CONTRAST: ICD-10-PCS | Mod: 26,,, | Performed by: RADIOLOGY

## 2022-04-11 ENCOUNTER — OFFICE VISIT (OUTPATIENT)
Dept: INTERNAL MEDICINE | Facility: CLINIC | Age: 72
End: 2022-04-11
Payer: COMMERCIAL

## 2022-04-11 ENCOUNTER — LAB VISIT (OUTPATIENT)
Dept: LAB | Facility: HOSPITAL | Age: 72
End: 2022-04-11
Attending: INTERNAL MEDICINE
Payer: COMMERCIAL

## 2022-04-11 VITALS
DIASTOLIC BLOOD PRESSURE: 86 MMHG | SYSTOLIC BLOOD PRESSURE: 130 MMHG | HEIGHT: 69 IN | BODY MASS INDEX: 26.55 KG/M2 | WEIGHT: 179.25 LBS | HEART RATE: 82 BPM | OXYGEN SATURATION: 96 %

## 2022-04-11 DIAGNOSIS — R97.20 PSA ELEVATION: ICD-10-CM

## 2022-04-11 DIAGNOSIS — J96.90 RESPIRATORY FAILURE, UNSPECIFIED CHRONICITY, UNSPECIFIED WHETHER WITH HYPOXIA OR HYPERCAPNIA: Primary | ICD-10-CM

## 2022-04-11 DIAGNOSIS — I10 ESSENTIAL HYPERTENSION: ICD-10-CM

## 2022-04-11 DIAGNOSIS — D64.9 ANEMIA, UNSPECIFIED TYPE: ICD-10-CM

## 2022-04-11 DIAGNOSIS — E78.5 HYPERLIPIDEMIA, UNSPECIFIED HYPERLIPIDEMIA TYPE: ICD-10-CM

## 2022-04-11 DIAGNOSIS — J96.01 ACUTE RESPIRATORY FAILURE WITH HYPOXIA: ICD-10-CM

## 2022-04-11 DIAGNOSIS — J43.2 CENTRILOBULAR EMPHYSEMA: ICD-10-CM

## 2022-04-11 LAB
ALBUMIN SERPL BCP-MCNC: 4 G/DL (ref 3.5–5.2)
ALP SERPL-CCNC: 132 U/L (ref 55–135)
ALT SERPL W/O P-5'-P-CCNC: 24 U/L (ref 10–44)
ANION GAP SERPL CALC-SCNC: 8 MMOL/L (ref 8–16)
AST SERPL-CCNC: 19 U/L (ref 10–40)
BILIRUB SERPL-MCNC: 0.8 MG/DL (ref 0.1–1)
BUN SERPL-MCNC: 16 MG/DL (ref 8–23)
CALCIUM SERPL-MCNC: 10.2 MG/DL (ref 8.7–10.5)
CHLORIDE SERPL-SCNC: 106 MMOL/L (ref 95–110)
CHOLEST SERPL-MCNC: 163 MG/DL (ref 120–199)
CHOLEST/HDLC SERPL: 2.7 {RATIO} (ref 2–5)
CO2 SERPL-SCNC: 26 MMOL/L (ref 23–29)
COMPLEXED PSA SERPL-MCNC: 7.4 NG/ML (ref 0–4)
CREAT SERPL-MCNC: 1.5 MG/DL (ref 0.5–1.4)
ERYTHROCYTE [DISTWIDTH] IN BLOOD BY AUTOMATED COUNT: 13.8 % (ref 11.5–14.5)
EST. GFR  (AFRICAN AMERICAN): 53.8 ML/MIN/1.73 M^2
EST. GFR  (NON AFRICAN AMERICAN): 46.5 ML/MIN/1.73 M^2
GLUCOSE SERPL-MCNC: 93 MG/DL (ref 70–110)
HCT VFR BLD AUTO: 43.1 % (ref 40–54)
HDLC SERPL-MCNC: 60 MG/DL (ref 40–75)
HDLC SERPL: 36.8 % (ref 20–50)
HGB BLD-MCNC: 13.4 G/DL (ref 14–18)
LDLC SERPL CALC-MCNC: 86.6 MG/DL (ref 63–159)
MCH RBC QN AUTO: 29.5 PG (ref 27–31)
MCHC RBC AUTO-ENTMCNC: 31.1 G/DL (ref 32–36)
MCV RBC AUTO: 95 FL (ref 82–98)
NONHDLC SERPL-MCNC: 103 MG/DL
PLATELET # BLD AUTO: 351 K/UL (ref 150–450)
PMV BLD AUTO: 9.8 FL (ref 9.2–12.9)
POTASSIUM SERPL-SCNC: 4.7 MMOL/L (ref 3.5–5.1)
PROT SERPL-MCNC: 8.2 G/DL (ref 6–8.4)
RBC # BLD AUTO: 4.55 M/UL (ref 4.6–6.2)
SODIUM SERPL-SCNC: 140 MMOL/L (ref 136–145)
TRIGL SERPL-MCNC: 82 MG/DL (ref 30–150)
WBC # BLD AUTO: 6.75 K/UL (ref 3.9–12.7)

## 2022-04-11 PROCEDURE — 1101F PT FALLS ASSESS-DOCD LE1/YR: CPT | Mod: CPTII,S$GLB,, | Performed by: INTERNAL MEDICINE

## 2022-04-11 PROCEDURE — 3079F PR MOST RECENT DIASTOLIC BLOOD PRESSURE 80-89 MM HG: ICD-10-PCS | Mod: CPTII,S$GLB,, | Performed by: INTERNAL MEDICINE

## 2022-04-11 PROCEDURE — 3075F PR MOST RECENT SYSTOLIC BLOOD PRESS GE 130-139MM HG: ICD-10-PCS | Mod: CPTII,S$GLB,, | Performed by: INTERNAL MEDICINE

## 2022-04-11 PROCEDURE — 3075F SYST BP GE 130 - 139MM HG: CPT | Mod: CPTII,S$GLB,, | Performed by: INTERNAL MEDICINE

## 2022-04-11 PROCEDURE — 80061 LIPID PANEL: CPT | Performed by: INTERNAL MEDICINE

## 2022-04-11 PROCEDURE — 99214 PR OFFICE/OUTPT VISIT, EST, LEVL IV, 30-39 MIN: ICD-10-PCS | Mod: S$GLB,,, | Performed by: INTERNAL MEDICINE

## 2022-04-11 PROCEDURE — 99214 OFFICE O/P EST MOD 30 MIN: CPT | Mod: S$GLB,,, | Performed by: INTERNAL MEDICINE

## 2022-04-11 PROCEDURE — 3079F DIAST BP 80-89 MM HG: CPT | Mod: CPTII,S$GLB,, | Performed by: INTERNAL MEDICINE

## 2022-04-11 PROCEDURE — 1159F PR MEDICATION LIST DOCUMENTED IN MEDICAL RECORD: ICD-10-PCS | Mod: CPTII,S$GLB,, | Performed by: INTERNAL MEDICINE

## 2022-04-11 PROCEDURE — 3008F PR BODY MASS INDEX (BMI) DOCUMENTED: ICD-10-PCS | Mod: CPTII,S$GLB,, | Performed by: INTERNAL MEDICINE

## 2022-04-11 PROCEDURE — 99999 PR PBB SHADOW E&M-EST. PATIENT-LVL III: ICD-10-PCS | Mod: PBBFAC,,, | Performed by: INTERNAL MEDICINE

## 2022-04-11 PROCEDURE — 3288F PR FALLS RISK ASSESSMENT DOCUMENTED: ICD-10-PCS | Mod: CPTII,S$GLB,, | Performed by: INTERNAL MEDICINE

## 2022-04-11 PROCEDURE — 3008F BODY MASS INDEX DOCD: CPT | Mod: CPTII,S$GLB,, | Performed by: INTERNAL MEDICINE

## 2022-04-11 PROCEDURE — 84153 ASSAY OF PSA TOTAL: CPT | Performed by: INTERNAL MEDICINE

## 2022-04-11 PROCEDURE — 1101F PR PT FALLS ASSESS DOC 0-1 FALLS W/OUT INJ PAST YR: ICD-10-PCS | Mod: CPTII,S$GLB,, | Performed by: INTERNAL MEDICINE

## 2022-04-11 PROCEDURE — 1126F PR PAIN SEVERITY QUANTIFIED, NO PAIN PRESENT: ICD-10-PCS | Mod: CPTII,S$GLB,, | Performed by: INTERNAL MEDICINE

## 2022-04-11 PROCEDURE — 99999 PR PBB SHADOW E&M-EST. PATIENT-LVL III: CPT | Mod: PBBFAC,,, | Performed by: INTERNAL MEDICINE

## 2022-04-11 PROCEDURE — 36415 COLL VENOUS BLD VENIPUNCTURE: CPT | Performed by: INTERNAL MEDICINE

## 2022-04-11 PROCEDURE — 1126F AMNT PAIN NOTED NONE PRSNT: CPT | Mod: CPTII,S$GLB,, | Performed by: INTERNAL MEDICINE

## 2022-04-11 PROCEDURE — 3288F FALL RISK ASSESSMENT DOCD: CPT | Mod: CPTII,S$GLB,, | Performed by: INTERNAL MEDICINE

## 2022-04-11 PROCEDURE — 80053 COMPREHEN METABOLIC PANEL: CPT | Performed by: INTERNAL MEDICINE

## 2022-04-11 PROCEDURE — 85027 COMPLETE CBC AUTOMATED: CPT | Performed by: INTERNAL MEDICINE

## 2022-04-11 PROCEDURE — 1159F MED LIST DOCD IN RCRD: CPT | Mod: CPTII,S$GLB,, | Performed by: INTERNAL MEDICINE

## 2022-04-11 NOTE — PROGRESS NOTES
Subjective:       Patient ID: William Bueno is a 71 y.o. male.    Chief Complaint: Follow-up    HPI   C/o poor stamina.     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     Inactive x 7 mo .      He had severe covid, and has home oxygen.  Pulse sat may fall to  88, but quickly corrects,  he stopped using it.  He is not sob if he walks slowly.   Insurance did not cover pulm rehab.  He is now walking 2 miles 3 days a week.   Hasn't smoked since September.    We have been following lung nodules.  By 2/22 CT:   1. There has been interval resolution of the left lower lobe and right middle lobe semi-solid lid nodular foci.  2. When compared to the recent chest radiograph, there is improved aeration in the right lower lobe.  The right lower lobe does show new cylindrical bronchiectasis the which may be post infectious in nature.  3. The mild alveolar filling in the right lower lobe may be related to incompletely resolved pneumonia.  Pneumonitis and small airways disease may also be contributing to this appearance.    Urination normal with flomax.    bmi- up to 26.5 co-incident with stopping smoking.    Long standing htn.      Lab review:  Chronic anemia .  Acute bump in cr last fall to 1.6.    Elevated psa - Dr Chavarria following.    Depression much improved.  Coping well with wife's death.  Review of Systems   Constitutional: Negative for activity change and unexpected weight change.   HENT: Negative for postnasal drip, rhinorrhea and sinus pressure/congestion.    Respiratory: Negative for chest tightness and shortness of breath.    Cardiovascular: Negative for chest pain and leg swelling.   Gastrointestinal: Negative for abdominal pain, nausea and vomiting.   Genitourinary: Negative for difficulty urinating, dysuria, hematuria and urgency.   Integumentary:  Negative for rash.   Neurological: Negative for headaches.   Psychiatric/Behavioral: Negative for dysphoric mood and sleep disturbance. The patient is not  nervous/anxious.          Objective:      Physical Exam  Constitutional:       Appearance: He is well-developed.   Eyes:      General: No scleral icterus.  Neck:      Thyroid: No thyromegaly.      Vascular: No JVD.   Cardiovascular:      Rate and Rhythm: Normal rate and regular rhythm.      Heart sounds: Normal heart sounds.   Pulmonary:      Effort: Pulmonary effort is normal. No respiratory distress.      Breath sounds: Normal breath sounds. No wheezing or rales.   Abdominal:      Palpations: Abdomen is soft. There is no mass.      Tenderness: There is no abdominal tenderness.   Neurological:      Mental Status: He is alert and oriented to person, place, and time.   Psychiatric:         Behavior: Behavior normal.         Assessment:       Problem List Items Addressed This Visit     Essential hypertension    Centrilobular emphysema    Anemia, unspecified    Relevant Orders    CBC Without Differential    Acute respiratory failure with hypoxia      Other Visit Diagnoses     Respiratory failure, unspecified chronicity, unspecified whether with hypoxia or hypercapnia    -  Primary    Relevant Orders    Six Minute Walk Test to qualify for Home Oxygen    Hyperlipidemia, unspecified hyperlipidemia type        Relevant Orders    Comprehensive Metabolic Panel    Lipid Panel    PSA elevation        Relevant Orders    PSA, Screening          Plan:       William was seen today for follow-up.    Diagnoses and all orders for this visit:    Respiratory failure, unspecified chronicity, unspecified whether with hypoxia or hypercapnia  -     Six Minute Walk Test to qualify for Home Oxygen; Future    Acute respiratory failure with hypoxia    Centrilobular emphysema    Essential hypertension    Hyperlipidemia, unspecified hyperlipidemia type  -     Comprehensive Metabolic Panel; Future  -     Lipid Panel; Future    PSA elevation  -     PSA, Screening; Future    Anemia, unspecified type  -     CBC Without Differential; Future          Repeat 6 min walk, to ensure he no longer needs 02.

## 2022-04-13 ENCOUNTER — HOSPITAL ENCOUNTER (OUTPATIENT)
Dept: PULMONOLOGY | Facility: CLINIC | Age: 72
Discharge: HOME | End: 2022-04-13
Payer: COMMERCIAL

## 2022-04-13 VITALS — BODY MASS INDEX: 26.51 KG/M2 | WEIGHT: 179 LBS | HEIGHT: 69 IN

## 2022-04-13 DIAGNOSIS — J96.90 RESPIRATORY FAILURE, UNSPECIFIED CHRONICITY, UNSPECIFIED WHETHER WITH HYPOXIA OR HYPERCAPNIA: ICD-10-CM

## 2022-04-13 PROCEDURE — 94618 PULMONARY STRESS TESTING: ICD-10-PCS | Mod: S$GLB,,, | Performed by: INTERNAL MEDICINE

## 2022-04-13 PROCEDURE — 94618 PULMONARY STRESS TESTING: CPT | Mod: S$GLB,,, | Performed by: INTERNAL MEDICINE

## 2022-04-13 NOTE — PROCEDURES
William Bueno is a 71 y.o.  male patient, who presents for a 6 minute walk test ordered by MD Brien.  The diagnosis is Qualify for Oxygen; COPD/Emphysema; S/P COVID.  The patient's BMI is 26.4 kg/m2.  Predicted distance (lower limit of normal) is 353.1 meters.      Test Results:    The test was completed without stopping.  The total time walked was 360 seconds.  During walking, the patient reported:  Dyspnea.  The patient used no assistive devices during testing.     04/13/2022---------Distance: 328.27 meters (1077 feet)     O2 Sat % Supplemental Oxygen Heart Rate Blood Pressure Melissa Scale   Pre-exercise  (Resting) 96 % Room Air 92 bpm 135/87 mmHg 1   During Exercise 92 % Room Air 106 bpm 143/93 mmHg 2   Post-exercise  (Recovery) 96 % Room Air  85 bpm 141/90 mmHg      Recovery Time: 160 seconds    Performing nurse/tech: Marie LUTHER      PREVIOUS STUDY:   10/27/2021---------Distance: 365.76 meters (1200 feet)       O2 Sat % Supplemental Oxygen Heart Rate Blood Pressure Melissa Scale   Pre-exercise  (Resting) 92 % Room Air 106 bpm 104/62 mmHg 2   During Exercise 83 % Room Air 133 bpm 119/57 mmHg 4   Post-exercise    92 % Room Air  113 bpm           CLINICAL INTERPRETATION:  Six minute walk distance is 328.27 meters (1077 feet) with light dyspnea.  During exercise, there was desaturation while breathing room air.  Blood pressure increased significantly and Heart rate remained stable with walking.  The patient did not report non-pulmonary symptoms during exercise.  Since the previous study in October 2021, exercise capacity may be somewhat worse.  Based upon age and body mass index, exercise capacity is less than predicted.

## 2022-04-14 ENCOUNTER — TELEPHONE (OUTPATIENT)
Dept: INTERNAL MEDICINE | Facility: CLINIC | Age: 72
End: 2022-04-14
Payer: COMMERCIAL

## 2022-04-14 NOTE — TELEPHONE ENCOUNTER
----- Message from Lexie Tesfaye MD sent at 4/14/2022 10:15 AM CDT -----  Pls call - he can stop oxygen.  Let supplier know.

## 2022-04-17 DIAGNOSIS — R97.20 PSA ELEVATION: Primary | ICD-10-CM

## 2022-04-18 ENCOUNTER — TELEPHONE (OUTPATIENT)
Dept: INTERNAL MEDICINE | Facility: CLINIC | Age: 72
End: 2022-04-18
Payer: COMMERCIAL

## 2022-04-18 NOTE — TELEPHONE ENCOUNTER
----- Message from Lexie Tesfaye MD sent at 4/17/2022  5:08 PM CDT -----  Pls review with pt and schedule URology appt:    Mr Bueno - PSA is higher, so I'd like you to follow up with Dr Chavarria in Urology.  Anemia has improved and kidney function is stable  NE

## 2022-04-18 NOTE — TELEPHONE ENCOUNTER
Called the pt to discuss test results. The pt expressed understanding. I have the pt scheduled to see Dr. Chavarria in urology.

## 2022-04-28 ENCOUNTER — TELEPHONE (OUTPATIENT)
Dept: INTERNAL MEDICINE | Facility: CLINIC | Age: 72
End: 2022-04-28
Payer: COMMERCIAL

## 2022-04-28 RX ORDER — FELODIPINE 5 MG/1
5 TABLET, EXTENDED RELEASE ORAL DAILY
Qty: 90 TABLET | Refills: 3 | Status: SHIPPED | OUTPATIENT
Start: 2022-04-28 | End: 2023-04-24 | Stop reason: SDUPTHER

## 2022-04-28 NOTE — TELEPHONE ENCOUNTER
Refill Routing Note   Medication(s) are not appropriate for processing by [facility] for the following reason(s):      - temporary refill 9/14/21     [facility] action(s):  Defer       Medication Therapy Plan: noted as temporary refill 9/14/21  Medication reconciliation completed: No     Appointments  past 12m or future 3m with PCP    Date Provider   Last Visit   4/11/2022 Lexie Tesfaye MD   Next Visit   Visit date not found Lexie Tesfaye MD   ED visits in past 90 days: 0        Note composed:2:00 PM 04/28/2022

## 2022-04-28 NOTE — TELEPHONE ENCOUNTER
----- Message from Rossana Pugh sent at 4/28/2022  1:53 PM CDT -----  Contact: Self/[phone]  Requesting an RX refill or new RX.  Is this a refill or new RX: New  RX name and strength:  felodipine (PLENDIL) 5 MG 24 hr tablet  Is this a 30 day or 90 day RX: 90  [facility]/pharm[facility] #[id number] - [place] - [address]  [address]  [facility] [place] [address]  Phone: [phone] Fax: [phone]      The doctors have asked that we provide their patients with the following 2 reminders -- prescription refills can take up to 72 hours, and a friendly reminder that in the future you can use your [facility] account to request refills:       Pt stated that he only has 1 pill left

## 2022-04-28 NOTE — TELEPHONE ENCOUNTER
Refill Routing Note   Medication(s) are not appropriate for processing by [facility] for the following reason(s):      - Indication is outside of scope for [facility]    [facility] action(s):  Defer Medication-related problems identified: Drug-drug interaction     Medication Therapy Plan: noted as a temporary fill - please verify for maintenance therapy  Medication reconciliation completed: No     Appointments  past 12m or future 3m with PCP    Date Provider   Last Visit   4/11/2022 Lexie Tesfaye MD   Next Visit   Visit date not found Lexie Tesfaye MD   ED visits in past 90 days: 0        Note composed:1:55 PM 04/28/2022

## 2022-04-28 NOTE — TELEPHONE ENCOUNTER
No new care gaps identified.  Powered by [facility] [employer]. Reference number: [id number].   4/28/2022 7:26:07 AM CDT

## 2022-05-04 ENCOUNTER — TELEPHONE (OUTPATIENT)
Dept: UROLOGY | Facility: CLINIC | Age: 72
End: 2022-05-04
Payer: COMMERCIAL

## 2022-05-04 NOTE — TELEPHONE ENCOUNTER
1802-I gave pt the message that he will be responsible for appointment.  Pt will call Central Pricing Office.  Also gave pt Jeremy's number in case pt needs to reschedule.    From: Michelle Leslie   Sent: 5/4/2022   4:27 PM CDT   To: Deon Garcia Staff   Subject: MRN: [id number]                                     [facility] is out of network with this members plan. If the patient is seen they will be fully responsible . Please advise patient

## 2022-05-05 ENCOUNTER — TELEPHONE (OUTPATIENT)
Dept: UROLOGY | Facility: CLINIC | Age: 72
End: 2022-05-05
Payer: COMMERCIAL

## 2022-05-09 ENCOUNTER — OFFICE VISIT (OUTPATIENT)
Dept: UROLOGY | Facility: CLINIC | Age: 72
End: 2022-05-09
Payer: COMMERCIAL

## 2022-05-09 VITALS
HEART RATE: 85 BPM | BODY MASS INDEX: 26.77 KG/M2 | HEIGHT: 69 IN | DIASTOLIC BLOOD PRESSURE: 88 MMHG | SYSTOLIC BLOOD PRESSURE: 145 MMHG | WEIGHT: 180.75 LBS

## 2022-05-09 DIAGNOSIS — I10 PRIMARY HYPERTENSION: ICD-10-CM

## 2022-05-09 DIAGNOSIS — N40.1 BENIGN NON-NODULAR PROSTATIC HYPERPLASIA WITH LOWER URINARY TRACT SYMPTOMS: ICD-10-CM

## 2022-05-09 DIAGNOSIS — R97.20 PSA ELEVATION: Primary | ICD-10-CM

## 2022-05-09 PROCEDURE — 1160F RVW MEDS BY RX/DR IN RCRD: CPT | Mod: CPTII,S$GLB,, | Performed by: UROLOGY

## 2022-05-09 PROCEDURE — 1160F PR REVIEW ALL MEDS BY PRESCRIBER/CLIN PHARMACIST DOCUMENTED: ICD-10-PCS | Mod: CPTII,S$GLB,, | Performed by: UROLOGY

## 2022-05-09 PROCEDURE — 1101F PT FALLS ASSESS-DOCD LE1/YR: CPT | Mod: CPTII,S$GLB,, | Performed by: UROLOGY

## 2022-05-09 PROCEDURE — 3008F PR BODY MASS INDEX (BMI) DOCUMENTED: ICD-10-PCS | Mod: CPTII,S$GLB,, | Performed by: UROLOGY

## 2022-05-09 PROCEDURE — 1126F PR PAIN SEVERITY QUANTIFIED, NO PAIN PRESENT: ICD-10-PCS | Mod: CPTII,S$GLB,, | Performed by: UROLOGY

## 2022-05-09 PROCEDURE — 1101F PR PT FALLS ASSESS DOC 0-1 FALLS W/OUT INJ PAST YR: ICD-10-PCS | Mod: CPTII,S$GLB,, | Performed by: UROLOGY

## 2022-05-09 PROCEDURE — 1159F MED LIST DOCD IN RCRD: CPT | Mod: CPTII,S$GLB,, | Performed by: UROLOGY

## 2022-05-09 PROCEDURE — 3077F PR MOST RECENT SYSTOLIC BLOOD PRESSURE >= 140 MM HG: ICD-10-PCS | Mod: CPTII,S$GLB,, | Performed by: UROLOGY

## 2022-05-09 PROCEDURE — 3288F PR FALLS RISK ASSESSMENT DOCUMENTED: ICD-10-PCS | Mod: CPTII,S$GLB,, | Performed by: UROLOGY

## 2022-05-09 PROCEDURE — 99999 PR PBB SHADOW E&M-EST. PATIENT-LVL III: ICD-10-PCS | Mod: PBBFAC,,, | Performed by: UROLOGY

## 2022-05-09 PROCEDURE — 3077F SYST BP >= 140 MM HG: CPT | Mod: CPTII,S$GLB,, | Performed by: UROLOGY

## 2022-05-09 PROCEDURE — 3008F BODY MASS INDEX DOCD: CPT | Mod: CPTII,S$GLB,, | Performed by: UROLOGY

## 2022-05-09 PROCEDURE — 3079F DIAST BP 80-89 MM HG: CPT | Mod: CPTII,S$GLB,, | Performed by: UROLOGY

## 2022-05-09 PROCEDURE — 1126F AMNT PAIN NOTED NONE PRSNT: CPT | Mod: CPTII,S$GLB,, | Performed by: UROLOGY

## 2022-05-09 PROCEDURE — 99999 PR PBB SHADOW E&M-EST. PATIENT-LVL III: CPT | Mod: PBBFAC,,, | Performed by: UROLOGY

## 2022-05-09 PROCEDURE — 3079F PR MOST RECENT DIASTOLIC BLOOD PRESSURE 80-89 MM HG: ICD-10-PCS | Mod: CPTII,S$GLB,, | Performed by: UROLOGY

## 2022-05-09 PROCEDURE — 1159F PR MEDICATION LIST DOCUMENTED IN MEDICAL RECORD: ICD-10-PCS | Mod: CPTII,S$GLB,, | Performed by: UROLOGY

## 2022-05-09 PROCEDURE — 3288F FALL RISK ASSESSMENT DOCD: CPT | Mod: CPTII,S$GLB,, | Performed by: UROLOGY

## 2022-05-09 PROCEDURE — 99214 PR OFFICE/OUTPT VISIT, EST, LEVL IV, 30-39 MIN: ICD-10-PCS | Mod: S$GLB,,, | Performed by: UROLOGY

## 2022-05-09 PROCEDURE — 99214 OFFICE O/P EST MOD 30 MIN: CPT | Mod: S$GLB,,, | Performed by: UROLOGY

## 2022-05-09 RX ORDER — TAMSULOSIN HYDROCHLORIDE 0.4 MG/1
0.4 CAPSULE ORAL
Qty: 90 CAPSULE | Refills: 3 | Status: SHIPPED | OUTPATIENT
Start: 2022-05-09 | End: 2022-11-15 | Stop reason: SDUPTHER

## 2022-05-09 NOTE — PROGRESS NOTES
Subjective:      Patient ID: William Bueno is a 71 y.o. male.    Chief Complaint: Benign Prostatic Hypertrophy/Elevated psa    HPI  Patient is a 71 y.o. male who is established to our clinic and referred by their PCP, Dr. Tesfaye for evaluation of bph/elevated psa.  Patient reports significant improvement in his LUTs since starting on flomax last year.  He is happy with how he pees.  He quit smoking 8 months ago.  Reports a 30lb weight gain.       Of note, he underwent a prostate MRI on 7/1/21 (report in media).  MRI prostate volume 37.6cc. no prostate lesions.      He denies any bone pain or unexpected weight loss.  He denies any fh of prostate cancer.     Lab Results   Component Value Date    PSA 7.4 (H) 04/11/2022    PSA 5.6 (H) 04/16/2021    PSA 4.0 11/14/2019    PSA 3.5 10/18/2018    PSA 3.2 04/20/2015    PSADIAG 4.8 (H) 06/14/2021       IPSS Questionnaire (AUA-SS) 5/9/22:  Over the past month    1)  Incomplete Emptying - How often have you had a sensation of not emptying your bladder completely after you finish urinating?  1 - Less than 1 time in 5   2)  Frequency - How often have you had to urinate again less than two hours after you finished urinating? 0 - Not at all   3)  Intermittency - How often have you found you stopped and started again several times when you urinated?  0 - Not at all   4) Urgency - How difficult have you found it to postpone urination?  0 - Not at all   5) Weak Stream - How often have you had a weak urinary stream?  3 - About half the time   6) Straining  - How often have you had to push or strain to begin urination?  0 - Not at all   7) Nocturia - How many times did you most typically get up to urinate from the time you went to bed until the time you got up in the morning?  1 - 1 time   Total score:  0-7 mild, 8-19 moderate, 20-35 severe 5   Quality of Life:  1 - Pleased          IPSS Questionnaire (AUA-SS) 9/28/21:  Over the past month    1)  Incomplete Emptying - How often have you  had a sensation of not emptying your bladder completely after you finish urinating?  4 - More than half the time   2)  Frequency - How often have you had to urinate again less than two hours after you finished urinating? 4 - More than half the time   3)  Intermittency - How often have you found you stopped and started again several times when you urinated?  3 - About half the time   4) Urgency - How difficult have you found it to postpone urination?  4 - More than half the time   5) Weak Stream - How often have you had a weak urinary stream?  5 - Almost always   6) Straining  - How often have you had to push or strain to begin urination?  4 - More than half the time   7) Nocturia - How many times did you most typically get up to urinate from the time you went to bed until the time you got up in the morning?  5 - 5+ times   Total score:  0-7 mild, 8-19 moderate, 20-35 severe 29   Quality of Life:  4 - Mostly Dissatisfied        IPSS Questionnaire (AUA-SS)5/3/21:  Over the past month    1)  Incomplete Emptying - How often have you had a sensation of not emptying your bladder completely after you finish urinating?  1 - Less than 1 time in 5   2)  Frequency - How often have you had to urinate again less than two hours after you finished urinating? 0 - Not at all   3)  Intermittency - How often have you found you stopped and started again several times when you urinated?  1 - Less than 1 time in 5   4) Urgency - How difficult have you found it to postpone urination?  1 - Less than 1 time in 5   5) Weak Stream - How often have you had a weak urinary stream?  1 - Less than 1 time in 5   6) Straining  - How often have you had to push or strain to begin urination?  1 - Less than 1 time in 5   7) Nocturia - How many times did you most typically get up to urinate from the time you went to bed until the time you got up in the morning?  1 - 1 time   Total score:  0-7 mild, 8-19 moderate, 20-35 severe 6   Quality of Life:  1 -  Pleased           Lab Results   Component Value Date    PSA 7.4 (H) 04/11/2022    PSA 5.6 (H) 04/16/2021    PSA 4.0 11/14/2019    PSA 3.5 10/18/2018    PSA 3.2 04/20/2015    PSADIAG 4.8 (H) 06/14/2021         Review of Systems   All other systems reviewed and negative except pertinent positives noted in HPI.    Objective:      Physical Exam  Constitutional:       General: He is not in acute distress.     Appearance: He is well-developed.   HENT:      Head: Normocephalic and atraumatic.   Eyes:      General: No scleral icterus.  Neck:      Trachea: No tracheal deviation.   Pulmonary:      Effort: Pulmonary effort is normal. No respiratory distress.   Neurological:      Mental Status: He is alert and oriented to person, place, and time.   Psychiatric:         Behavior: Behavior normal.         Thought Content: Thought content normal.         Judgment: Judgment normal.         Assessment:       1. Benign non-nodular prostatic hyperplasia with lower urinary tract symptoms    2. PSA elevation    3. Primary hypertension        Plan:     1. Benign non-nodular prostatic hyperplasia with lower urinary tract symptoms    2. PSA elevation    3. Primary hypertension        No orders of the defined types were placed in this encounter.      1. Elevated PSA:   - The patient was counseled on the implications of an elevated PSA. It was explained in detail that this is a screening test and does not indicate any known presence of prostate cancer but that he is at increased risk given he has an elevated PSA. All his questions were answered by me fully.   -MRI again reviewed from July of 2021 and negative.  PSA increased since last checked.   -discussed prostate biopsy vs psa surveillance.   -patient would like to pursue psa surveillance only at this time.  Plan for a psa in 6 months.     2. BPH:  -urine dip: leuks neg, nitrite neg, blood neg.   -symptoms dramatically improved.    -Flomax refills sent to pharmacy  -A post void residual  bladder scan was performed immediately after voiding. The patient's PVR was noted to be 15cc.      3. HTN:  -BP reviewed today and elevated  -continue current medications  -encouraged f/u and discussion of BP with PCP.

## 2022-10-17 ENCOUNTER — PES CALL (OUTPATIENT)
Dept: ADMINISTRATIVE | Facility: CLINIC | Age: 72
End: 2022-10-17
Payer: COMMERCIAL

## 2022-10-18 ENCOUNTER — PES CALL (OUTPATIENT)
Dept: ADMINISTRATIVE | Facility: CLINIC | Age: 72
End: 2022-10-18
Payer: COMMERCIAL

## 2022-11-07 ENCOUNTER — LAB VISIT (OUTPATIENT)
Dept: LAB | Facility: HOSPITAL | Age: 72
End: 2022-11-07
Attending: UROLOGY
Payer: COMMERCIAL

## 2022-11-07 DIAGNOSIS — R97.20 PSA ELEVATION: ICD-10-CM

## 2022-11-07 LAB — COMPLEXED PSA SERPL-MCNC: 6.3 NG/ML (ref 0–4)

## 2022-11-07 PROCEDURE — 84153 ASSAY OF PSA TOTAL: CPT | Performed by: UROLOGY

## 2022-11-07 PROCEDURE — 36415 COLL VENOUS BLD VENIPUNCTURE: CPT | Performed by: UROLOGY

## 2022-11-15 ENCOUNTER — OFFICE VISIT (OUTPATIENT)
Dept: UROLOGY | Facility: CLINIC | Age: 72
End: 2022-11-15
Payer: COMMERCIAL

## 2022-11-15 VITALS
HEART RATE: 95 BPM | SYSTOLIC BLOOD PRESSURE: 137 MMHG | BODY MASS INDEX: 26.59 KG/M2 | WEIGHT: 179.5 LBS | DIASTOLIC BLOOD PRESSURE: 87 MMHG | HEIGHT: 69 IN

## 2022-11-15 DIAGNOSIS — R97.20 PSA ELEVATION: Primary | ICD-10-CM

## 2022-11-15 DIAGNOSIS — N40.1 BENIGN NON-NODULAR PROSTATIC HYPERPLASIA WITH LOWER URINARY TRACT SYMPTOMS: ICD-10-CM

## 2022-11-15 DIAGNOSIS — N52.01 ERECTILE DYSFUNCTION DUE TO ARTERIAL INSUFFICIENCY: ICD-10-CM

## 2022-11-15 DIAGNOSIS — I10 PRIMARY HYPERTENSION: ICD-10-CM

## 2022-11-15 PROCEDURE — 3288F FALL RISK ASSESSMENT DOCD: CPT | Mod: CPTII,S$GLB,, | Performed by: UROLOGY

## 2022-11-15 PROCEDURE — 1160F RVW MEDS BY RX/DR IN RCRD: CPT | Mod: CPTII,S$GLB,, | Performed by: UROLOGY

## 2022-11-15 PROCEDURE — 1126F AMNT PAIN NOTED NONE PRSNT: CPT | Mod: CPTII,S$GLB,, | Performed by: UROLOGY

## 2022-11-15 PROCEDURE — 99214 PR OFFICE/OUTPT VISIT, EST, LEVL IV, 30-39 MIN: ICD-10-PCS | Mod: S$GLB,,, | Performed by: UROLOGY

## 2022-11-15 PROCEDURE — 99214 OFFICE O/P EST MOD 30 MIN: CPT | Mod: S$GLB,,, | Performed by: UROLOGY

## 2022-11-15 PROCEDURE — 3008F BODY MASS INDEX DOCD: CPT | Mod: CPTII,S$GLB,, | Performed by: UROLOGY

## 2022-11-15 PROCEDURE — 99999 PR PBB SHADOW E&M-EST. PATIENT-LVL III: ICD-10-PCS | Mod: PBBFAC,,, | Performed by: UROLOGY

## 2022-11-15 PROCEDURE — 1160F PR REVIEW ALL MEDS BY PRESCRIBER/CLIN PHARMACIST DOCUMENTED: ICD-10-PCS | Mod: CPTII,S$GLB,, | Performed by: UROLOGY

## 2022-11-15 PROCEDURE — 1101F PR PT FALLS ASSESS DOC 0-1 FALLS W/OUT INJ PAST YR: ICD-10-PCS | Mod: CPTII,S$GLB,, | Performed by: UROLOGY

## 2022-11-15 PROCEDURE — 1159F PR MEDICATION LIST DOCUMENTED IN MEDICAL RECORD: ICD-10-PCS | Mod: CPTII,S$GLB,, | Performed by: UROLOGY

## 2022-11-15 PROCEDURE — 3288F PR FALLS RISK ASSESSMENT DOCUMENTED: ICD-10-PCS | Mod: CPTII,S$GLB,, | Performed by: UROLOGY

## 2022-11-15 PROCEDURE — 1126F PR PAIN SEVERITY QUANTIFIED, NO PAIN PRESENT: ICD-10-PCS | Mod: CPTII,S$GLB,, | Performed by: UROLOGY

## 2022-11-15 PROCEDURE — 3075F PR MOST RECENT SYSTOLIC BLOOD PRESS GE 130-139MM HG: ICD-10-PCS | Mod: CPTII,S$GLB,, | Performed by: UROLOGY

## 2022-11-15 PROCEDURE — 1101F PT FALLS ASSESS-DOCD LE1/YR: CPT | Mod: CPTII,S$GLB,, | Performed by: UROLOGY

## 2022-11-15 PROCEDURE — 3079F DIAST BP 80-89 MM HG: CPT | Mod: CPTII,S$GLB,, | Performed by: UROLOGY

## 2022-11-15 PROCEDURE — 99499 UNLISTED E&M SERVICE: CPT | Mod: S$GLB,,, | Performed by: UROLOGY

## 2022-11-15 PROCEDURE — 3008F PR BODY MASS INDEX (BMI) DOCUMENTED: ICD-10-PCS | Mod: CPTII,S$GLB,, | Performed by: UROLOGY

## 2022-11-15 PROCEDURE — 3075F SYST BP GE 130 - 139MM HG: CPT | Mod: CPTII,S$GLB,, | Performed by: UROLOGY

## 2022-11-15 PROCEDURE — 99999 PR PBB SHADOW E&M-EST. PATIENT-LVL III: CPT | Mod: PBBFAC,,, | Performed by: UROLOGY

## 2022-11-15 PROCEDURE — 99499 RISK ADDL DX/OHS AUDIT: ICD-10-PCS | Mod: S$GLB,,, | Performed by: UROLOGY

## 2022-11-15 PROCEDURE — 3079F PR MOST RECENT DIASTOLIC BLOOD PRESSURE 80-89 MM HG: ICD-10-PCS | Mod: CPTII,S$GLB,, | Performed by: UROLOGY

## 2022-11-15 PROCEDURE — 1159F MED LIST DOCD IN RCRD: CPT | Mod: CPTII,S$GLB,, | Performed by: UROLOGY

## 2022-11-15 RX ORDER — TAMSULOSIN HYDROCHLORIDE 0.4 MG/1
0.4 CAPSULE ORAL
Qty: 90 CAPSULE | Refills: 3 | Status: SHIPPED | OUTPATIENT
Start: 2022-11-15 | End: 2023-11-14 | Stop reason: SDUPTHER

## 2022-11-15 RX ORDER — SILDENAFIL 100 MG/1
100 TABLET, FILM COATED ORAL DAILY PRN
Qty: 10 TABLET | Refills: 3 | Status: SHIPPED | OUTPATIENT
Start: 2022-11-15 | End: 2023-11-14

## 2022-11-15 RX ORDER — FAMOTIDINE 40 MG/1
40 TABLET, FILM COATED ORAL 2 TIMES DAILY
COMMUNITY
Start: 2022-09-26 | End: 2023-07-11

## 2022-11-15 NOTE — PROGRESS NOTES
Subjective:      Patient ID: William Bueno is a 71 y.o. male.    Chief Complaint: Benign Prostatic Hypertrophy/Elevated psa    HPI  Patient is a 71 y.o. male who is established to our clinic and referred by their PCP, Dr. Tesfaye for evaluation of bph/elevated psa.  Patient reports significant improvement in his LUTs since starting on flomax last year.  He is happy with how he pees.   He does report anejaculation with the flomax but this is not bothersome to him.      He quit smoking 8 months ago.  Reports a 30lb weight gain.       Of note, he underwent a prostate MRI on 7/1/21 (report in media).  MRI prostate volume 37.6cc. no prostate lesions.      He denies any bone pain or unexpected weight loss.  He denies any fh of prostate cancer.     Lab Results   Component Value Date    PSA 7.4 (H) 04/11/2022    PSA 5.6 (H) 04/16/2021    PSA 4.0 11/14/2019    PSA 3.5 10/18/2018    PSA 3.2 04/20/2015    PSADIAG 6.3 (H) 11/07/2022    PSADIAG 4.8 (H) 06/14/2021       IPSS Questionnaire (AUA-SS) 5/9/22:  Over the past month    1)  Incomplete Emptying - How often have you had a sensation of not emptying your bladder completely after you finish urinating?  1 - Less than 1 time in 5   2)  Frequency - How often have you had to urinate again less than two hours after you finished urinating? 0 - Not at all   3)  Intermittency - How often have you found you stopped and started again several times when you urinated?  0 - Not at all   4) Urgency - How difficult have you found it to postpone urination?  0 - Not at all   5) Weak Stream - How often have you had a weak urinary stream?  3 - About half the time   6) Straining  - How often have you had to push or strain to begin urination?  0 - Not at all   7) Nocturia - How many times did you most typically get up to urinate from the time you went to bed until the time you got up in the morning?  1 - 1 time   Total score:  0-7 mild, 8-19 moderate, 20-35 severe 5   Quality of Life:  1 -  Pleased          IPSS Questionnaire (AUA-SS) 9/28/21:  Over the past month    1)  Incomplete Emptying - How often have you had a sensation of not emptying your bladder completely after you finish urinating?  4 - More than half the time   2)  Frequency - How often have you had to urinate again less than two hours after you finished urinating? 4 - More than half the time   3)  Intermittency - How often have you found you stopped and started again several times when you urinated?  3 - About half the time   4) Urgency - How difficult have you found it to postpone urination?  4 - More than half the time   5) Weak Stream - How often have you had a weak urinary stream?  5 - Almost always   6) Straining  - How often have you had to push or strain to begin urination?  4 - More than half the time   7) Nocturia - How many times did you most typically get up to urinate from the time you went to bed until the time you got up in the morning?  5 - 5+ times   Total score:  0-7 mild, 8-19 moderate, 20-35 severe 29   Quality of Life:  4 - Mostly Dissatisfied        IPSS Questionnaire (AUA-SS)5/3/21:  Over the past month    1)  Incomplete Emptying - How often have you had a sensation of not emptying your bladder completely after you finish urinating?  1 - Less than 1 time in 5   2)  Frequency - How often have you had to urinate again less than two hours after you finished urinating? 0 - Not at all   3)  Intermittency - How often have you found you stopped and started again several times when you urinated?  1 - Less than 1 time in 5   4) Urgency - How difficult have you found it to postpone urination?  1 - Less than 1 time in 5   5) Weak Stream - How often have you had a weak urinary stream?  1 - Less than 1 time in 5   6) Straining  - How often have you had to push or strain to begin urination?  1 - Less than 1 time in 5   7) Nocturia - How many times did you most typically get up to urinate from the time you went to bed until the  time you got up in the morning?  1 - 1 time   Total score:  0-7 mild, 8-19 moderate, 20-35 severe 6   Quality of Life:  1 - Pleased           Lab Results   Component Value Date    PSA 7.4 (H) 04/11/2022    PSA 5.6 (H) 04/16/2021    PSA 4.0 11/14/2019    PSA 3.5 10/18/2018    PSA 3.2 04/20/2015    PSADIAG 6.3 (H) 11/07/2022    PSADIAG 4.8 (H) 06/14/2021         Review of Systems   All other systems reviewed and negative except pertinent positives noted in HPI.    Objective:      Physical Exam  Constitutional:       General: He is not in acute distress.     Appearance: He is well-developed.   HENT:      Head: Normocephalic and atraumatic.   Eyes:      General: No scleral icterus.  Neck:      Trachea: No tracheal deviation.   Pulmonary:      Effort: Pulmonary effort is normal. No respiratory distress.   Neurological:      Mental Status: He is alert and oriented to person, place, and time.   Psychiatric:         Behavior: Behavior normal.         Thought Content: Thought content normal.         Judgment: Judgment normal.       Assessment:       1. PSA elevation    2. Benign non-nodular prostatic hyperplasia with lower urinary tract symptoms    3. Primary hypertension          Plan:     1. PSA elevation    2. Benign non-nodular prostatic hyperplasia with lower urinary tract symptoms    3. Primary hypertension          No orders of the defined types were placed in this encounter.      1. Elevated PSA:   - The patient was counseled on the implications of an elevated PSA. It was explained in detail that this is a screening test and does not indicate any known presence of prostate cancer but that he is at increased risk given he has an elevated PSA. All his questions were answered by me fully.   -Age adjusted PSA normals for  men:   40-49    0-2  50-59    0-4  60-69    0-4.5  70-79    0-5.5    -MRI again reviewed from July of 2021 and negative.  PSA decreased since last checked.   -discussed prostate biopsy  vs psa surveillance.   -patient would like to pursue psa surveillance only at this time.  Plan for a psa in 12 months.     2. BPH:  -symptoms dramatically improved.    -Flomax refills sent to pharmacy    3. ED:   -viagra sent to pharmacy        4 HTN:  -BP reviewed today and normal  -continue current medications  -encouraged f/u and discussion of BP with PCP.

## 2022-12-02 ENCOUNTER — SSC ENCOUNTER (OUTPATIENT)
Dept: ADMINISTRATIVE | Facility: OTHER | Age: 72
End: 2022-12-02
Payer: COMMERCIAL

## 2022-12-02 NOTE — PROGRESS NOTES
Thank you for the referral. Patient's information has been forwarded to [employer] Case Management team for screening.    Please contact Outpatient Care Management at Ext. [phone] with questions.    Thank you,    Helen Juarez, [facility]  Outpatient Case Mgmnt  [phone]

## 2023-02-07 DIAGNOSIS — Z00.00 ENCOUNTER FOR MEDICARE ANNUAL WELLNESS EXAM: ICD-10-CM

## 2023-02-09 DIAGNOSIS — Z00.00 ENCOUNTER FOR MEDICARE ANNUAL WELLNESS EXAM: ICD-10-CM

## 2023-04-04 ENCOUNTER — OFFICE VISIT (OUTPATIENT)
Dept: INTERNAL MEDICINE | Facility: CLINIC | Age: 73
End: 2023-04-04
Payer: MEDICARE

## 2023-04-04 VITALS
HEART RATE: 67 BPM | DIASTOLIC BLOOD PRESSURE: 86 MMHG | HEIGHT: 69 IN | BODY MASS INDEX: 27.07 KG/M2 | OXYGEN SATURATION: 96 % | SYSTOLIC BLOOD PRESSURE: 134 MMHG | WEIGHT: 182.75 LBS

## 2023-04-04 DIAGNOSIS — N18.31 STAGE 3A CHRONIC KIDNEY DISEASE: ICD-10-CM

## 2023-04-04 DIAGNOSIS — Z00.00 ENCOUNTER FOR PREVENTIVE HEALTH EXAMINATION: Primary | ICD-10-CM

## 2023-04-04 DIAGNOSIS — Z86.010 HISTORY OF COLON POLYPS: ICD-10-CM

## 2023-04-04 DIAGNOSIS — N40.1 BENIGN PROSTATIC HYPERPLASIA WITH LOWER URINARY TRACT SYMPTOMS, SYMPTOM DETAILS UNSPECIFIED: ICD-10-CM

## 2023-04-04 DIAGNOSIS — J43.2 CENTRILOBULAR EMPHYSEMA: ICD-10-CM

## 2023-04-04 DIAGNOSIS — R06.00 DYSPNEA, UNSPECIFIED TYPE: ICD-10-CM

## 2023-04-04 DIAGNOSIS — Z59.9 FINANCIAL DIFFICULTIES: ICD-10-CM

## 2023-04-04 DIAGNOSIS — Z00.00 ENCOUNTER FOR MEDICARE ANNUAL WELLNESS EXAM: ICD-10-CM

## 2023-04-04 DIAGNOSIS — J44.9 CHRONIC OBSTRUCTIVE PULMONARY DISEASE, UNSPECIFIED COPD TYPE: ICD-10-CM

## 2023-04-04 DIAGNOSIS — F32.5 MAJOR DEPRESSIVE DISORDER WITH SINGLE EPISODE, IN FULL REMISSION: ICD-10-CM

## 2023-04-04 DIAGNOSIS — D64.9 ANEMIA, UNSPECIFIED TYPE: ICD-10-CM

## 2023-04-04 DIAGNOSIS — R97.20 ELEVATED PSA: ICD-10-CM

## 2023-04-04 DIAGNOSIS — I10 ESSENTIAL HYPERTENSION: ICD-10-CM

## 2023-04-04 DIAGNOSIS — N52.01 ERECTILE DYSFUNCTION DUE TO ARTERIAL INSUFFICIENCY: ICD-10-CM

## 2023-04-04 DIAGNOSIS — I70.0 AORTIC ATHEROSCLEROSIS: ICD-10-CM

## 2023-04-04 PROCEDURE — 1160F RVW MEDS BY RX/DR IN RCRD: CPT | Mod: HCNC,CPTII,S$GLB, | Performed by: NURSE PRACTITIONER

## 2023-04-04 PROCEDURE — 3079F DIAST BP 80-89 MM HG: CPT | Mod: HCNC,CPTII,S$GLB, | Performed by: NURSE PRACTITIONER

## 2023-04-04 PROCEDURE — 1126F PR PAIN SEVERITY QUANTIFIED, NO PAIN PRESENT: ICD-10-PCS | Mod: HCNC,CPTII,S$GLB, | Performed by: NURSE PRACTITIONER

## 2023-04-04 PROCEDURE — 1170F FXNL STATUS ASSESSED: CPT | Mod: HCNC,CPTII,S$GLB, | Performed by: NURSE PRACTITIONER

## 2023-04-04 PROCEDURE — 1126F AMNT PAIN NOTED NONE PRSNT: CPT | Mod: HCNC,CPTII,S$GLB, | Performed by: NURSE PRACTITIONER

## 2023-04-04 PROCEDURE — 99999 PR PBB SHADOW E&M-EST. PATIENT-LVL V: CPT | Mod: PBBFAC,HCNC,, | Performed by: NURSE PRACTITIONER

## 2023-04-04 PROCEDURE — 3075F SYST BP GE 130 - 139MM HG: CPT | Mod: HCNC,CPTII,S$GLB, | Performed by: NURSE PRACTITIONER

## 2023-04-04 PROCEDURE — 1101F PT FALLS ASSESS-DOCD LE1/YR: CPT | Mod: HCNC,CPTII,S$GLB, | Performed by: NURSE PRACTITIONER

## 2023-04-04 PROCEDURE — 3079F PR MOST RECENT DIASTOLIC BLOOD PRESSURE 80-89 MM HG: ICD-10-PCS | Mod: HCNC,CPTII,S$GLB, | Performed by: NURSE PRACTITIONER

## 2023-04-04 PROCEDURE — G0439 PR MEDICARE ANNUAL WELLNESS SUBSEQUENT VISIT: ICD-10-PCS | Mod: HCNC,S$GLB,, | Performed by: NURSE PRACTITIONER

## 2023-04-04 PROCEDURE — 3288F FALL RISK ASSESSMENT DOCD: CPT | Mod: HCNC,CPTII,S$GLB, | Performed by: NURSE PRACTITIONER

## 2023-04-04 PROCEDURE — 1101F PR PT FALLS ASSESS DOC 0-1 FALLS W/OUT INJ PAST YR: ICD-10-PCS | Mod: HCNC,CPTII,S$GLB, | Performed by: NURSE PRACTITIONER

## 2023-04-04 PROCEDURE — 1159F PR MEDICATION LIST DOCUMENTED IN MEDICAL RECORD: ICD-10-PCS | Mod: HCNC,CPTII,S$GLB, | Performed by: NURSE PRACTITIONER

## 2023-04-04 PROCEDURE — 3008F BODY MASS INDEX DOCD: CPT | Mod: HCNC,CPTII,S$GLB, | Performed by: NURSE PRACTITIONER

## 2023-04-04 PROCEDURE — 3288F PR FALLS RISK ASSESSMENT DOCUMENTED: ICD-10-PCS | Mod: HCNC,CPTII,S$GLB, | Performed by: NURSE PRACTITIONER

## 2023-04-04 PROCEDURE — 1160F PR REVIEW ALL MEDS BY PRESCRIBER/CLIN PHARMACIST DOCUMENTED: ICD-10-PCS | Mod: HCNC,CPTII,S$GLB, | Performed by: NURSE PRACTITIONER

## 2023-04-04 PROCEDURE — 1170F PR FUNCTIONAL STATUS ASSESSED: ICD-10-PCS | Mod: HCNC,CPTII,S$GLB, | Performed by: NURSE PRACTITIONER

## 2023-04-04 PROCEDURE — 3008F PR BODY MASS INDEX (BMI) DOCUMENTED: ICD-10-PCS | Mod: HCNC,CPTII,S$GLB, | Performed by: NURSE PRACTITIONER

## 2023-04-04 PROCEDURE — G0439 PPPS, SUBSEQ VISIT: HCPCS | Mod: HCNC,S$GLB,, | Performed by: NURSE PRACTITIONER

## 2023-04-04 PROCEDURE — 99999 PR PBB SHADOW E&M-EST. PATIENT-LVL V: ICD-10-PCS | Mod: PBBFAC,HCNC,, | Performed by: NURSE PRACTITIONER

## 2023-04-04 PROCEDURE — 1159F MED LIST DOCD IN RCRD: CPT | Mod: HCNC,CPTII,S$GLB, | Performed by: NURSE PRACTITIONER

## 2023-04-04 PROCEDURE — 3075F PR MOST RECENT SYSTOLIC BLOOD PRESS GE 130-139MM HG: ICD-10-PCS | Mod: HCNC,CPTII,S$GLB, | Performed by: NURSE PRACTITIONER

## 2023-04-04 SDOH — SOCIAL DETERMINANTS OF HEALTH (SDOH): PROBLEM RELATED TO HOUSING AND ECONOMIC CIRCUMSTANCES, UNSPECIFIED: Z59.9

## 2023-04-04 NOTE — PROGRESS NOTES
"William Bueno presented for a  Medicare AWV and comprehensive Health Risk Assessment today. The following components were reviewed and updated:    Medical history  Family History  Social history  Allergies and Current Medications  Health Risk Assessment  Health Maintenance  Care Team         ** See Completed Assessments for Annual Wellness Visit within the encounter summary.**         The following assessments were completed:  Living Situation  CAGE  Depression Screening  Timed Get Up and Go  Whisper Test  Cognitive Function Screening    Nutrition Screening  ADL Screening  PAQ Screening  Review for Opioid Screening: Pt does not have Rx for Opioids.  Review for Substance Use Disorders: Patient does not use substances.        Vitals:    04/04/23 1303   BP: 134/86   BP Location: Left arm   Pulse: 67   SpO2: 96%   Weight: 82.9 kg (182 lb 12.2 oz)   Height: 5' 9" (1.753 m)     Body mass index is 26.99 kg/m².    Physical Exam  Vitals reviewed.   Constitutional:       Appearance: Normal appearance.   HENT:      Head: Normocephalic.   Cardiovascular:      Rate and Rhythm: Normal rate.   Pulmonary:      Effort: Pulmonary effort is normal.   Abdominal:      General: Bowel sounds are normal.   Musculoskeletal:         General: Normal range of motion.      Right lower leg: No edema.      Left lower leg: No edema.   Skin:     General: Skin is warm and dry.      Capillary Refill: Capillary refill takes less than 2 seconds.   Neurological:      Mental Status: He is alert and oriented to person, place, and time.   Psychiatric:         Behavior: Behavior normal.         Thought Content: Thought content normal.         Judgment: Judgment normal.             Diagnoses and health risks identified today and associated recommendations/orders:    1. Encounter for preventive health examination  Assessments completed.  HM recommendations reviewed. Discussed shingrix and covid bivalent booster. Will discuss LDCT at annual with PCP. Colonoscopy " due 06/2023.  F/u with PCP as instructed.    2. Stage 3a chronic kidney disease  Chronic, stable on current regimen. Followed by PCP.    3. Chronic obstructive pulmonary disease, unspecified COPD type  Chronic, stable on current regimen. Followed by pulmonology.  - Ambulatory referral/consult to Outpatient Case Management    4. Centrilobular emphysema  Chronic, stable on current regimen. Followed by pulmonology.    5. Aortic atherosclerosis  Chronic, stable on current regimen. Followed by PCP. On statin.    6. Major depressive disorder with single episode, in full remission  Chronic, stable on current regimen. Followed by PPC.  - Ambulatory referral/consult to Outpatient Case Management    7. Financial difficulties  Chronic, stable. Referred to outpatient case management for food and utilities assistance. Followed by PCP.  - Ambulatory referral/consult to Outpatient Case Management    8. Essential hypertension  Chronic, stable on current regimen. Followed by PCP.    9. Dyspnea, unspecified type  Chronic, stable on current regimen. Followed by pulmonology.    10. Anemia, unspecified type  Chronic, stable on current regimen. Followed by PCP.    11. Elevated PSA  Chronic, stable on current regimen. Followed by urology.    12. Benign prostatic hyperplasia with lower urinary tract symptoms, symptom details unspecified  Chronic, stable on current regimen. Followed by urology.    13. Erectile dysfunction due to arterial insufficiency  Chronic, stable on current regimen. Followed by urology.    14. History of colon polyps  Chronic, stable. Colonoscopy due 06/2023. Followed by PCP.    15. Encounter for Medicare annual wellness exam  Medicare AWV completed.  - Ambulatory Referral/Consult to Enhanced Annual Wellness Visit (eAWV)      Provided William with a 5-10 year written screening schedule and personal prevention plan. Recommendations were developed using the USPSTF age appropriate recommendations. Education, counseling, and  referrals were provided as needed. After Visit Summary printed and given to patient which includes a list of additional screenings\tests needed.    Follow up in about 1 year (around 4/4/2024) for Medicare AWV and with PCP as instructed.       Deedee Hargrove NP    I offered to discuss advanced care planning, including how to pick a person who would make decisions for you if you were unable to make them for yourself, called a health care power of , and what kind of decisions you might make such as use of life sustaining treatments such as ventilators and tube feeding when faced with a life limiting illness recorded on a living will that they will need to know. (How you want to be cared for as you near the end of your natural life)     X Patient is interested in learning more about how to make advanced directives.  I provided them paperwork and offered to discuss this with them.

## 2023-04-04 NOTE — PATIENT INSTRUCTIONS
1. Schedule annual with Dr. Lexie Tesfaye MD .    2. Listen out for call from case management team.    3. Discuss repeated chest scan with Dr. Lexie Tesfaye MD at annual.    4. Second shingles shot, SHINGRIX, at pharmacy.    5. Covid bivalent booster if desired.    Counseling and Referral of Other Preventative  (Italic type indicates deductible and co-insurance are waived)    Patient Name: William Bueno  Today's Date: 4/4/2023    Health Maintenance         Date Due Completion Date    Shingles Vaccine (2 of 2) 07/29/2022 6/3/2022    LDCT Lung Screen 02/24/2023 2/24/2022    Colorectal Cancer Screening 06/22/2023 6/22/2020    High Dose Statin 11/15/2023 11/15/2022    Lipid Panel 04/11/2027 4/11/2022    TETANUS VACCINE 10/27/2031 10/27/2021          Orders Placed This Encounter   Procedures    Ambulatory referral/consult to Outpatient Case Management       The following information is provided to all patients.  This information is to help you find resources for any of the problems found today that may be affecting your health:                Living healthy guide: www.[facility].[place].gov      Understanding Diabetes: www.diabetes.org      Eating healthy: www.cdc.gov/healthyweight      CDC home safety checklist: www.cdc.gov/steadi/patient.html      Agency on Aging: www.goea.[place].gov      Alcoholics anonymous (AA): www.aa.org      Physical Activity: www.[place].nih.gov/tz9pown      Tobacco use: www.quitwithusla.org

## 2023-04-18 ENCOUNTER — OUTPATIENT CASE MANAGEMENT (OUTPATIENT)
Dept: ADMINISTRATIVE | Facility: OTHER | Age: 73
End: 2023-04-18
Payer: MEDICARE

## 2023-04-18 NOTE — PROGRESS NOTES
Outpatient Care Management   - Low Risk Patient Assessment    Patient: William Bueno  MRN:  [id number]  Date of Service:  4/18/2023  Completed by:  Kristine Smith LMSW  Referral Date: 04/04/2023    Reason for Visit   Patient presents with    Social Work Assessment - Low/Mod Risk     04/18/2023    Plan Of Care     04/18/2023    Case Closure     04/18/2023       Brief Summary:  received a referral from YESY Hargrove. SW completed assessment with patient. Patient reports residing in a  senior facility. Patient reports he can complete ADL's without assistance. Patient denied needing assistance with food, shelter, medication or medical but interested in future resources for utilities.  Patient aware of [employer] OTC benefits.   Patient receives [employer] healthy food card. Patient aware of [employer] transportation benefit. Patient drives himself to and from appointment. Patient denied any current symptoms. Care plan was created in collaboration with patient/caregiver input.

## 2023-04-19 DIAGNOSIS — N18.31 STAGE 3A CHRONIC KIDNEY DISEASE: ICD-10-CM

## 2023-06-26 ENCOUNTER — PATIENT MESSAGE (OUTPATIENT)
Dept: ADMINISTRATIVE | Facility: OTHER | Age: 73
End: 2023-06-26
Payer: MEDICARE

## 2023-07-11 ENCOUNTER — LAB VISIT (OUTPATIENT)
Dept: LAB | Facility: HOSPITAL | Age: 73
End: 2023-07-11
Attending: INTERNAL MEDICINE
Payer: MEDICARE

## 2023-07-11 ENCOUNTER — OFFICE VISIT (OUTPATIENT)
Dept: INTERNAL MEDICINE | Facility: CLINIC | Age: 73
End: 2023-07-11
Payer: MEDICARE

## 2023-07-11 VITALS
HEART RATE: 77 BPM | WEIGHT: 181.44 LBS | SYSTOLIC BLOOD PRESSURE: 128 MMHG | HEIGHT: 69 IN | BODY MASS INDEX: 26.87 KG/M2 | DIASTOLIC BLOOD PRESSURE: 92 MMHG | OXYGEN SATURATION: 95 %

## 2023-07-11 DIAGNOSIS — D64.9 ANEMIA, UNSPECIFIED TYPE: ICD-10-CM

## 2023-07-11 DIAGNOSIS — F32.5 MAJOR DEPRESSIVE DISORDER WITH SINGLE EPISODE, IN FULL REMISSION: ICD-10-CM

## 2023-07-11 DIAGNOSIS — N18.31 STAGE 3A CHRONIC KIDNEY DISEASE: ICD-10-CM

## 2023-07-11 DIAGNOSIS — Z00.00 ANNUAL PHYSICAL EXAM: Primary | ICD-10-CM

## 2023-07-11 DIAGNOSIS — J44.9 CHRONIC OBSTRUCTIVE PULMONARY DISEASE, UNSPECIFIED COPD TYPE: ICD-10-CM

## 2023-07-11 DIAGNOSIS — I10 ESSENTIAL HYPERTENSION: ICD-10-CM

## 2023-07-11 DIAGNOSIS — R06.00 DYSPNEA, UNSPECIFIED TYPE: ICD-10-CM

## 2023-07-11 DIAGNOSIS — R97.20 ELEVATED PSA: ICD-10-CM

## 2023-07-11 DIAGNOSIS — Z87.891 HISTORY OF CIGARETTE SMOKING: ICD-10-CM

## 2023-07-11 PROBLEM — R53.83 FATIGUE: Status: RESOLVED | Noted: 2021-10-19 | Resolved: 2023-07-11

## 2023-07-11 PROBLEM — Z87.19 S/P INGUINAL HERNIA REPAIR: Status: RESOLVED | Noted: 2017-11-10 | Resolved: 2023-07-11

## 2023-07-11 PROBLEM — J96.01 ACUTE RESPIRATORY FAILURE WITH HYPOXIA: Status: RESOLVED | Noted: 2021-11-26 | Resolved: 2023-07-11

## 2023-07-11 PROBLEM — Z98.890 S/P INGUINAL HERNIA REPAIR: Status: RESOLVED | Noted: 2017-11-10 | Resolved: 2023-07-11

## 2023-07-11 LAB
ALBUMIN SERPL BCP-MCNC: 4 G/DL (ref 3.5–5.2)
ALP SERPL-CCNC: 127 U/L (ref 55–135)
ALT SERPL W/O P-5'-P-CCNC: 20 U/L (ref 10–44)
ANION GAP SERPL CALC-SCNC: 9 MMOL/L (ref 8–16)
AST SERPL-CCNC: 20 U/L (ref 10–40)
BILIRUB SERPL-MCNC: 0.6 MG/DL (ref 0.1–1)
BUN SERPL-MCNC: 12 MG/DL (ref 8–23)
CALCIUM SERPL-MCNC: 9.6 MG/DL (ref 8.7–10.5)
CHLORIDE SERPL-SCNC: 109 MMOL/L (ref 95–110)
CHOLEST SERPL-MCNC: 147 MG/DL (ref 120–199)
CHOLEST/HDLC SERPL: 2.8 {RATIO} (ref 2–5)
CO2 SERPL-SCNC: 23 MMOL/L (ref 23–29)
CREAT SERPL-MCNC: 1.5 MG/DL (ref 0.5–1.4)
ERYTHROCYTE [DISTWIDTH] IN BLOOD BY AUTOMATED COUNT: 13.1 % (ref 11.5–14.5)
EST. GFR  (NO RACE VARIABLE): 49.5 ML/MIN/1.73 M^2
GLUCOSE SERPL-MCNC: 85 MG/DL (ref 70–110)
HCT VFR BLD AUTO: 42.2 % (ref 40–54)
HDLC SERPL-MCNC: 52 MG/DL (ref 40–75)
HDLC SERPL: 35.4 % (ref 20–50)
HGB BLD-MCNC: 13.9 G/DL (ref 14–18)
LDLC SERPL CALC-MCNC: 76 MG/DL (ref 63–159)
MCH RBC QN AUTO: 30.5 PG (ref 27–31)
MCHC RBC AUTO-ENTMCNC: 32.9 G/DL (ref 32–36)
MCV RBC AUTO: 93 FL (ref 82–98)
NONHDLC SERPL-MCNC: 95 MG/DL
PLATELET # BLD AUTO: 331 K/UL (ref 150–450)
PMV BLD AUTO: 9.7 FL (ref 9.2–12.9)
POTASSIUM SERPL-SCNC: 4.2 MMOL/L (ref 3.5–5.1)
PROT SERPL-MCNC: 7.7 G/DL (ref 6–8.4)
RBC # BLD AUTO: 4.56 M/UL (ref 4.6–6.2)
SODIUM SERPL-SCNC: 141 MMOL/L (ref 136–145)
TRIGL SERPL-MCNC: 95 MG/DL (ref 30–150)
WBC # BLD AUTO: 6.06 K/UL (ref 3.9–12.7)

## 2023-07-11 PROCEDURE — 3074F SYST BP LT 130 MM HG: CPT | Mod: HCNC,CPTII,S$GLB, | Performed by: INTERNAL MEDICINE

## 2023-07-11 PROCEDURE — 99999 PR PBB SHADOW E&M-EST. PATIENT-LVL III: ICD-10-PCS | Mod: PBBFAC,HCNC,, | Performed by: INTERNAL MEDICINE

## 2023-07-11 PROCEDURE — 3008F BODY MASS INDEX DOCD: CPT | Mod: HCNC,CPTII,S$GLB, | Performed by: INTERNAL MEDICINE

## 2023-07-11 PROCEDURE — 3288F FALL RISK ASSESSMENT DOCD: CPT | Mod: HCNC,CPTII,S$GLB, | Performed by: INTERNAL MEDICINE

## 2023-07-11 PROCEDURE — 3074F PR MOST RECENT SYSTOLIC BLOOD PRESSURE < 130 MM HG: ICD-10-PCS | Mod: HCNC,CPTII,S$GLB, | Performed by: INTERNAL MEDICINE

## 2023-07-11 PROCEDURE — 1159F PR MEDICATION LIST DOCUMENTED IN MEDICAL RECORD: ICD-10-PCS | Mod: HCNC,CPTII,S$GLB, | Performed by: INTERNAL MEDICINE

## 2023-07-11 PROCEDURE — 80061 LIPID PANEL: CPT | Mod: HCNC | Performed by: INTERNAL MEDICINE

## 2023-07-11 PROCEDURE — 99397 PER PM REEVAL EST PAT 65+ YR: CPT | Mod: HCNC,S$GLB,, | Performed by: INTERNAL MEDICINE

## 2023-07-11 PROCEDURE — 1126F AMNT PAIN NOTED NONE PRSNT: CPT | Mod: HCNC,CPTII,S$GLB, | Performed by: INTERNAL MEDICINE

## 2023-07-11 PROCEDURE — 1101F PT FALLS ASSESS-DOCD LE1/YR: CPT | Mod: HCNC,CPTII,S$GLB, | Performed by: INTERNAL MEDICINE

## 2023-07-11 PROCEDURE — 36415 COLL VENOUS BLD VENIPUNCTURE: CPT | Mod: HCNC | Performed by: INTERNAL MEDICINE

## 2023-07-11 PROCEDURE — 3008F PR BODY MASS INDEX (BMI) DOCUMENTED: ICD-10-PCS | Mod: HCNC,CPTII,S$GLB, | Performed by: INTERNAL MEDICINE

## 2023-07-11 PROCEDURE — 85027 COMPLETE CBC AUTOMATED: CPT | Mod: HCNC | Performed by: INTERNAL MEDICINE

## 2023-07-11 PROCEDURE — 3080F PR MOST RECENT DIASTOLIC BLOOD PRESSURE >= 90 MM HG: ICD-10-PCS | Mod: HCNC,CPTII,S$GLB, | Performed by: INTERNAL MEDICINE

## 2023-07-11 PROCEDURE — 80053 COMPREHEN METABOLIC PANEL: CPT | Mod: HCNC | Performed by: INTERNAL MEDICINE

## 2023-07-11 PROCEDURE — 1126F PR PAIN SEVERITY QUANTIFIED, NO PAIN PRESENT: ICD-10-PCS | Mod: HCNC,CPTII,S$GLB, | Performed by: INTERNAL MEDICINE

## 2023-07-11 PROCEDURE — 99999 PR PBB SHADOW E&M-EST. PATIENT-LVL III: CPT | Mod: PBBFAC,HCNC,, | Performed by: INTERNAL MEDICINE

## 2023-07-11 PROCEDURE — 99397 PR PREVENTIVE VISIT,EST,65 & OVER: ICD-10-PCS | Mod: HCNC,S$GLB,, | Performed by: INTERNAL MEDICINE

## 2023-07-11 PROCEDURE — 3080F DIAST BP >= 90 MM HG: CPT | Mod: HCNC,CPTII,S$GLB, | Performed by: INTERNAL MEDICINE

## 2023-07-11 PROCEDURE — 3288F PR FALLS RISK ASSESSMENT DOCUMENTED: ICD-10-PCS | Mod: HCNC,CPTII,S$GLB, | Performed by: INTERNAL MEDICINE

## 2023-07-11 PROCEDURE — 1101F PR PT FALLS ASSESS DOC 0-1 FALLS W/OUT INJ PAST YR: ICD-10-PCS | Mod: HCNC,CPTII,S$GLB, | Performed by: INTERNAL MEDICINE

## 2023-07-11 PROCEDURE — 1159F MED LIST DOCD IN RCRD: CPT | Mod: HCNC,CPTII,S$GLB, | Performed by: INTERNAL MEDICINE

## 2023-07-11 NOTE — PROGRESS NOTES
Subjective     Patient ID: William Bueno is a 72 y.o. male.    Chief Complaint: Follow-up    Follow-up  Pertinent negatives include no abdominal pain, chest pain, headaches, nausea, rash or vomiting.   He c/o intermittent dyspnea, which he blames in part on Covid in 2021.  He was a long term smoker and stopped then.    Stamina improved.     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    Long standing htn, complicated by ckd3a, - elevated today.  He did not take his meds today.    He just joined digital htn program.    Hyperlipidemia, on atorvastatin.    PSA elevation , followed by Dr Chavarria.  Negative MRI.  To have recheck in November.    Major depression has resolved.  [place] has helped.    He has paid much more attention to diet, exercise after Covid.    H/o cigarette smoker.  Stopped 2021.    LDCT 2022 reviewed.            Review of Systems   Constitutional:  Negative for activity change and unexpected weight change.   HENT:  Negative for postnasal drip, rhinorrhea and sinus pressure/congestion.    Respiratory:  Positive for shortness of breath (with exertion). Negative for chest tightness.    Cardiovascular:  Negative for chest pain and leg swelling.   Gastrointestinal:  Negative for abdominal pain, nausea and vomiting.   Genitourinary:  Negative for difficulty urinating, dysuria, hematuria and urgency.   Integumentary:  Negative for rash.   Neurological:  Negative for headaches.   Psychiatric/Behavioral:  Negative for dysphoric mood and sleep disturbance. The patient is not nervous/anxious.         Objective     Physical Exam  Constitutional:       General: He is not in acute distress.     Appearance: He is well-developed. He is not ill-appearing, toxic-appearing or diaphoretic.   HENT:      Head: Normocephalic and atraumatic.   Eyes:      General: No scleral icterus.        Left eye: No discharge.      Conjunctiva/sclera: Conjunctivae normal.   Neck:      Thyroid: No  thyromegaly.      Vascular: No JVD.   Cardiovascular:      Rate and Rhythm: Normal rate and regular rhythm.      Heart sounds: Normal heart sounds. No murmur heard.  Pulmonary:      Effort: Pulmonary effort is normal. No respiratory distress.      Breath sounds: Normal breath sounds. No stridor. No wheezing, rhonchi or rales.   Abdominal:      General: There is no distension.      Palpations: Abdomen is soft. There is no mass.      Tenderness: There is no abdominal tenderness. There is no guarding.   Musculoskeletal:      Cervical back: Normal range of motion. No rigidity.      Right lower leg: No edema.      Left lower leg: No edema.   Lymphadenopathy:      Cervical: No cervical adenopathy.   Skin:     General: Skin is dry.      Findings: No rash.   Neurological:      Mental Status: He is alert and oriented to person, place, and time.   Psychiatric:         Behavior: Behavior normal.         Thought Content: Thought content normal.         Judgment: Judgment normal.          Assessment and Plan     1. Annual physical exam    2. Essential hypertension  -     Comprehensive Metabolic Panel; Future; Expected date: 07/11/2023    3. Chronic obstructive pulmonary disease, unspecified COPD type    4. Stage 3a chronic kidney disease  -     CBC Without Differential; Future; Expected date: 07/11/2023  -     Lipid Panel; Future; Expected date: 07/11/2023    5. Major depressive disorder with single episode, in full remission  Overview:  Depression following deaths of wife and brother in 2016.  Now stable off Zoloft.      6. Dyspnea, unspecified type    7. Anemia, unspecified type    8. Elevated PSA    9. History of cigarette smoking  -     CT Chest Lung Screening Low Dose; Future; Expected date: 07/11/2023    10. BMI 26.0-26.9,adult        He has already signed up for digital hypertension.  Exercise regularly.  Weight loss diet reviewed.  f  Take amlodipine daily!       F/u Dr Chavarria with PSA    Follow up in about 1 year  (around 7/11/2024) for PE.

## 2023-07-14 ENCOUNTER — PATIENT MESSAGE (OUTPATIENT)
Dept: ADMINISTRATIVE | Facility: OTHER | Age: 73
End: 2023-07-14
Payer: MEDICARE

## 2023-07-16 ENCOUNTER — TELEPHONE (OUTPATIENT)
Dept: INTERNAL MEDICINE | Facility: CLINIC | Age: 73
End: 2023-07-16
Payer: MEDICARE

## 2023-07-17 ENCOUNTER — PATIENT OUTREACH (OUTPATIENT)
Dept: ADMINISTRATIVE | Facility: HOSPITAL | Age: 73
End: 2023-07-17
Payer: MEDICARE

## 2023-07-17 VITALS — DIASTOLIC BLOOD PRESSURE: 83 MMHG | SYSTOLIC BLOOD PRESSURE: 122 MMHG

## 2023-07-17 NOTE — TELEPHONE ENCOUNTER
Obtained Bp 122/83, from patient's digital med flow sheet and updated chart.    Marlin Jesus LPN   Clinical Care Coordinator  Primary Care and Wellness

## 2023-07-17 NOTE — PROGRESS NOTES
Health Maintenance Due   Topic Date Due    COVID-19 Vaccine (6 - Moderna series) 07/29/2022    LDCT Lung Screen  02/24/2023       HM updated. Triggered LINKS and Care Everywhere. Obtained Bp 122/83, from patient's digital med flow sheet and updated chart.    Marlin Jesus LPN   Clinical Care Coordinator  Primary Care and Wellness

## 2023-07-21 RX ORDER — FELODIPINE 5 MG/1
5 TABLET, EXTENDED RELEASE ORAL DAILY
Qty: 90 TABLET | Refills: 3 | Status: SHIPPED | OUTPATIENT
Start: 2023-07-21

## 2023-09-20 ENCOUNTER — HOSPITAL ENCOUNTER (OUTPATIENT)
Dept: RADIOLOGY | Facility: HOSPITAL | Age: 73
Discharge: HOME | End: 2023-09-20
Attending: INTERNAL MEDICINE
Payer: MEDICARE

## 2023-09-20 DIAGNOSIS — Z87.891 HISTORY OF CIGARETTE SMOKING: ICD-10-CM

## 2023-09-20 PROCEDURE — 71271 CT THORAX LUNG CANCER SCR C-: CPT | Mod: TC,HCNC

## 2023-09-20 PROCEDURE — 71271 CT CHEST LUNG SCREENING LOW DOSE: ICD-10-PCS | Mod: 26,HCNC,, | Performed by: RADIOLOGY

## 2023-09-20 PROCEDURE — 71271 CT THORAX LUNG CANCER SCR C-: CPT | Mod: 26,HCNC,, | Performed by: RADIOLOGY

## 2023-09-24 DIAGNOSIS — I70.0 AORTIC ATHEROSCLEROSIS: ICD-10-CM

## 2023-09-24 RX ORDER — ATORVASTATIN CALCIUM 40 MG/1
40 TABLET, FILM COATED ORAL DAILY
Qty: 90 TABLET | Refills: 3 | Status: SHIPPED | OUTPATIENT
Start: 2023-09-24

## 2023-11-14 ENCOUNTER — LAB VISIT (OUTPATIENT)
Dept: LAB | Facility: HOSPITAL | Age: 73
End: 2023-11-14
Attending: UROLOGY
Payer: MEDICARE

## 2023-11-14 ENCOUNTER — OFFICE VISIT (OUTPATIENT)
Dept: UROLOGY | Facility: CLINIC | Age: 73
End: 2023-11-14
Payer: MEDICARE

## 2023-11-14 VITALS
BODY MASS INDEX: 26.64 KG/M2 | SYSTOLIC BLOOD PRESSURE: 143 MMHG | HEART RATE: 78 BPM | WEIGHT: 179.88 LBS | DIASTOLIC BLOOD PRESSURE: 82 MMHG | HEIGHT: 69 IN

## 2023-11-14 DIAGNOSIS — R97.20 PSA ELEVATION: Primary | ICD-10-CM

## 2023-11-14 DIAGNOSIS — I10 ESSENTIAL HYPERTENSION: ICD-10-CM

## 2023-11-14 DIAGNOSIS — R97.20 PSA ELEVATION: ICD-10-CM

## 2023-11-14 DIAGNOSIS — I70.0 AORTIC ATHEROSCLEROSIS: ICD-10-CM

## 2023-11-14 DIAGNOSIS — N52.01 ERECTILE DYSFUNCTION DUE TO ARTERIAL INSUFFICIENCY: ICD-10-CM

## 2023-11-14 LAB
ALT SERPL W/O P-5'-P-CCNC: 21 U/L (ref 10–44)
AST SERPL-CCNC: 21 U/L (ref 10–40)
CHOLEST SERPL-MCNC: 133 MG/DL (ref 120–199)
CHOLEST/HDLC SERPL: 2.8 {RATIO} (ref 2–5)
COMPLEXED PSA SERPL-MCNC: 7 NG/ML (ref 0–4)
HDLC SERPL-MCNC: 48 MG/DL (ref 40–75)
HDLC SERPL: 36.1 % (ref 20–50)
LDLC SERPL CALC-MCNC: 71.8 MG/DL (ref 63–159)
NONHDLC SERPL-MCNC: 85 MG/DL
TRIGL SERPL-MCNC: 66 MG/DL (ref 30–150)

## 2023-11-14 PROCEDURE — 1160F PR REVIEW ALL MEDS BY PRESCRIBER/CLIN PHARMACIST DOCUMENTED: ICD-10-PCS | Mod: HCNC,CPTII,S$GLB, | Performed by: UROLOGY

## 2023-11-14 PROCEDURE — 3079F DIAST BP 80-89 MM HG: CPT | Mod: HCNC,CPTII,S$GLB, | Performed by: UROLOGY

## 2023-11-14 PROCEDURE — 99999 PR PBB SHADOW E&M-EST. PATIENT-LVL III: CPT | Mod: PBBFAC,HCNC,, | Performed by: UROLOGY

## 2023-11-14 PROCEDURE — 84460 ALANINE AMINO (ALT) (SGPT): CPT | Mod: HCNC | Performed by: INTERNAL MEDICINE

## 2023-11-14 PROCEDURE — 1126F PR PAIN SEVERITY QUANTIFIED, NO PAIN PRESENT: ICD-10-PCS | Mod: HCNC,CPTII,S$GLB, | Performed by: UROLOGY

## 2023-11-14 PROCEDURE — 3079F PR MOST RECENT DIASTOLIC BLOOD PRESSURE 80-89 MM HG: ICD-10-PCS | Mod: HCNC,CPTII,S$GLB, | Performed by: UROLOGY

## 2023-11-14 PROCEDURE — 84153 ASSAY OF PSA TOTAL: CPT | Mod: HCNC | Performed by: UROLOGY

## 2023-11-14 PROCEDURE — 3008F BODY MASS INDEX DOCD: CPT | Mod: HCNC,CPTII,S$GLB, | Performed by: UROLOGY

## 2023-11-14 PROCEDURE — 3077F PR MOST RECENT SYSTOLIC BLOOD PRESSURE >= 140 MM HG: ICD-10-PCS | Mod: HCNC,CPTII,S$GLB, | Performed by: UROLOGY

## 2023-11-14 PROCEDURE — 99214 PR OFFICE/OUTPT VISIT, EST, LEVL IV, 30-39 MIN: ICD-10-PCS | Mod: HCNC,S$GLB,, | Performed by: UROLOGY

## 2023-11-14 PROCEDURE — 80061 LIPID PANEL: CPT | Mod: HCNC | Performed by: INTERNAL MEDICINE

## 2023-11-14 PROCEDURE — 1159F PR MEDICATION LIST DOCUMENTED IN MEDICAL RECORD: ICD-10-PCS | Mod: HCNC,CPTII,S$GLB, | Performed by: UROLOGY

## 2023-11-14 PROCEDURE — 99214 OFFICE O/P EST MOD 30 MIN: CPT | Mod: HCNC,S$GLB,, | Performed by: UROLOGY

## 2023-11-14 PROCEDURE — 3008F PR BODY MASS INDEX (BMI) DOCUMENTED: ICD-10-PCS | Mod: HCNC,CPTII,S$GLB, | Performed by: UROLOGY

## 2023-11-14 PROCEDURE — 1159F MED LIST DOCD IN RCRD: CPT | Mod: HCNC,CPTII,S$GLB, | Performed by: UROLOGY

## 2023-11-14 PROCEDURE — 1160F RVW MEDS BY RX/DR IN RCRD: CPT | Mod: HCNC,CPTII,S$GLB, | Performed by: UROLOGY

## 2023-11-14 PROCEDURE — 3077F SYST BP >= 140 MM HG: CPT | Mod: HCNC,CPTII,S$GLB, | Performed by: UROLOGY

## 2023-11-14 PROCEDURE — 99999 PR PBB SHADOW E&M-EST. PATIENT-LVL III: ICD-10-PCS | Mod: PBBFAC,HCNC,, | Performed by: UROLOGY

## 2023-11-14 PROCEDURE — 3288F PR FALLS RISK ASSESSMENT DOCUMENTED: ICD-10-PCS | Mod: HCNC,CPTII,S$GLB, | Performed by: UROLOGY

## 2023-11-14 PROCEDURE — 1101F PR PT FALLS ASSESS DOC 0-1 FALLS W/OUT INJ PAST YR: ICD-10-PCS | Mod: HCNC,CPTII,S$GLB, | Performed by: UROLOGY

## 2023-11-14 PROCEDURE — 1101F PT FALLS ASSESS-DOCD LE1/YR: CPT | Mod: HCNC,CPTII,S$GLB, | Performed by: UROLOGY

## 2023-11-14 PROCEDURE — 84450 TRANSFERASE (AST) (SGOT): CPT | Mod: HCNC | Performed by: INTERNAL MEDICINE

## 2023-11-14 PROCEDURE — 1126F AMNT PAIN NOTED NONE PRSNT: CPT | Mod: HCNC,CPTII,S$GLB, | Performed by: UROLOGY

## 2023-11-14 PROCEDURE — 3288F FALL RISK ASSESSMENT DOCD: CPT | Mod: HCNC,CPTII,S$GLB, | Performed by: UROLOGY

## 2023-11-14 PROCEDURE — 36415 COLL VENOUS BLD VENIPUNCTURE: CPT | Mod: HCNC | Performed by: UROLOGY

## 2023-11-14 RX ORDER — TAMSULOSIN HYDROCHLORIDE 0.4 MG/1
0.4 CAPSULE ORAL
Qty: 90 CAPSULE | Refills: 3 | Status: SHIPPED | OUTPATIENT
Start: 2023-11-14 | End: 2024-11-08

## 2023-11-14 NOTE — PROGRESS NOTES
Subjective:      Patient ID: William Bueno is a 72 y.o. male.    Chief Complaint: Benign Prostatic Hypertrophy/Elevated psa    HPI  Patient is a 72 y.o. male who is established to our clinic and referred by their PCP, Dr. Tesfaye for evaluation of bph/elevated psa.  Patient reports significant improvement in his LUTs since starting on flomax last year.  He is happy with how he pees.   He does report anejaculation with the flomax but this is not bothersome to him.      He quit smoking last year. .  Reports a 30lb weight gain.       Of note, he underwent a prostate MRI on 7/1/21 (report in media).  MRI prostate volume 37.6cc. no prostate lesions.  He has never had a prostate biopsy but has not had an interest in a biopsy in the past.      He is happy with his urinating.  Taking flomax.    He denies any bone pain or unexpected weight loss.  He denies any fh of prostate cancer.     Lab Results   Component Value Date    PSA 7.4 (H) 04/11/2022    PSA 5.6 (H) 04/16/2021    PSA 4.0 11/14/2019    PSA 3.5 10/18/2018    PSA 3.2 04/20/2015    PSADIAG 7.0 (H) 11/14/2023    PSADIAG 6.3 (H) 11/07/2022    PSADIAG 4.8 (H) 06/14/2021       IPSS Questionnaire (AUA-SS) 5/9/22:  Over the past month    1)  Incomplete Emptying - How often have you had a sensation of not emptying your bladder completely after you finish urinating?  1 - Less than 1 time in 5   2)  Frequency - How often have you had to urinate again less than two hours after you finished urinating? 0 - Not at all   3)  Intermittency - How often have you found you stopped and started again several times when you urinated?  0 - Not at all   4) Urgency - How difficult have you found it to postpone urination?  0 - Not at all   5) Weak Stream - How often have you had a weak urinary stream?  3 - About half the time   6) Straining  - How often have you had to push or strain to begin urination?  0 - Not at all   7) Nocturia - How many times did you most typically get up to urinate  from the time you went to bed until the time you got up in the morning?  1 - 1 time   Total score:  0-7 mild, 8-19 moderate, 20-35 severe 5   Quality of Life:  1 - Pleased          IPSS Questionnaire (AUA-SS) 9/28/21:  Over the past month    1)  Incomplete Emptying - How often have you had a sensation of not emptying your bladder completely after you finish urinating?  4 - More than half the time   2)  Frequency - How often have you had to urinate again less than two hours after you finished urinating? 4 - More than half the time   3)  Intermittency - How often have you found you stopped and started again several times when you urinated?  3 - About half the time   4) Urgency - How difficult have you found it to postpone urination?  4 - More than half the time   5) Weak Stream - How often have you had a weak urinary stream?  5 - Almost always   6) Straining  - How often have you had to push or strain to begin urination?  4 - More than half the time   7) Nocturia - How many times did you most typically get up to urinate from the time you went to bed until the time you got up in the morning?  5 - 5+ times   Total score:  0-7 mild, 8-19 moderate, 20-35 severe 29   Quality of Life:  4 - Mostly Dissatisfied        IPSS Questionnaire (AUA-SS)5/3/21:  Over the past month    1)  Incomplete Emptying - How often have you had a sensation of not emptying your bladder completely after you finish urinating?  1 - Less than 1 time in 5   2)  Frequency - How often have you had to urinate again less than two hours after you finished urinating? 0 - Not at all   3)  Intermittency - How often have you found you stopped and started again several times when you urinated?  1 - Less than 1 time in 5   4) Urgency - How difficult have you found it to postpone urination?  1 - Less than 1 time in 5   5) Weak Stream - How often have you had a weak urinary stream?  1 - Less than 1 time in 5   6) Straining  - How often have you had to push or  strain to begin urination?  1 - Less than 1 time in 5   7) Nocturia - How many times did you most typically get up to urinate from the time you went to bed until the time you got up in the morning?  1 - 1 time   Total score:  0-7 mild, 8-19 moderate, 20-35 severe 6   Quality of Life:  1 - Pleased           Lab Results   Component Value Date    PSA 7.4 (H) 04/11/2022    PSA 5.6 (H) 04/16/2021    PSA 4.0 11/14/2019    PSA 3.5 10/18/2018    PSA 3.2 04/20/2015    PSADIAG 7.0 (H) 11/14/2023    PSADIAG 6.3 (H) 11/07/2022    PSADIAG 4.8 (H) 06/14/2021         Review of Systems   All other systems reviewed and negative except pertinent positives noted in HPI.    Objective:      Physical Exam  Constitutional:       General: He is not in acute distress.     Appearance: He is well-developed.   HENT:      Head: Normocephalic and atraumatic.   Eyes:      General: No scleral icterus.  Neck:      Trachea: No tracheal deviation.   Pulmonary:      Effort: Pulmonary effort is normal. No respiratory distress.   Neurological:      Mental Status: He is alert and oriented to person, place, and time.   Psychiatric:         Behavior: Behavior normal.         Thought Content: Thought content normal.         Judgment: Judgment normal.         Assessment:       1. PSA elevation    2. Erectile dysfunction due to arterial insufficiency    3. Essential hypertension            Plan:     1. PSA elevation    2. Erectile dysfunction due to arterial insufficiency    3. Essential hypertension            No orders of the defined types were placed in this encounter.        1. Elevated PSA:   - The patient was counseled on the implications of an elevated PSA. It was explained in detail that this is a screening test and does not indicate any known presence of prostate cancer but that he is at increased risk given he has an elevated PSA. All his questions were answered by me fully.   -Age adjusted PSA normals for  men:   40-49    0-2  50-59     0-4  60-69    0-4.5  70-79    0-5.5    -MRI again reviewed from July of 2021 and negative.  PSA decreased since last checked.   -discussed prostate biopsy vs psa surveillance.   -plan for repeat prostate mri, next available.  Will notify patient of results.     2. BPH:  -symptoms dramatically improved.    -Flomax refills sent to pharmacy    3. ED:   -viagra previously sent to pharmacy but he only took one pill and is not interested in using this further  -removed from med list.       4 HTN:  -BP reviewed today and elevated  -continue current medications  -encouraged f/u and discussion of BP with PCP.     -encouraged f/u and discussion of BP with PCP.

## 2023-12-15 ENCOUNTER — HOSPITAL ENCOUNTER (OUTPATIENT)
Dept: RADIOLOGY | Facility: HOSPITAL | Age: 73
Discharge: HOME | End: 2023-12-15
Attending: UROLOGY
Payer: MEDICARE

## 2023-12-15 DIAGNOSIS — R97.20 PSA ELEVATION: ICD-10-CM

## 2023-12-15 PROCEDURE — 25500020 PHARM REV CODE 255: Mod: HCNC | Performed by: UROLOGY

## 2023-12-15 PROCEDURE — 72197 MRI PELVIS W/O & W/DYE: CPT | Mod: 26,HCNC,, | Performed by: RADIOLOGY

## 2023-12-15 PROCEDURE — 72197 MRI PROSTATE W W/O CONTRAST: ICD-10-PCS | Mod: 26,HCNC,, | Performed by: RADIOLOGY

## 2023-12-15 PROCEDURE — 72197 MRI PELVIS W/O & W/DYE: CPT | Mod: TC,HCNC

## 2023-12-15 PROCEDURE — A9585 GADOBUTROL INJECTION: HCPCS | Mod: HCNC | Performed by: UROLOGY

## 2023-12-15 RX ORDER — GADOBUTROL 604.72 MG/ML
10 INJECTION INTRAVENOUS
Status: COMPLETED | OUTPATIENT
Start: 2023-12-15 | End: 2023-12-15

## 2023-12-15 RX ADMIN — GADOBUTROL 10 ML: 604.72 INJECTION INTRAVENOUS at 07:12

## 2023-12-21 ENCOUNTER — TELEPHONE (OUTPATIENT)
Dept: UROLOGY | Facility: CLINIC | Age: 73
End: 2023-12-21
Payer: MEDICARE

## 2023-12-21 DIAGNOSIS — R97.20 PSA ELEVATION: Primary | ICD-10-CM

## 2023-12-21 RX ORDER — ENEMA 19; 7 G/133ML; G/133ML
1 ENEMA RECTAL ONCE
Qty: 1 ENEMA | Refills: 0 | Status: SHIPPED | OUTPATIENT
Start: 2023-12-21 | End: 2023-12-21

## 2023-12-21 RX ORDER — CIPROFLOXACIN 500 MG/1
TABLET ORAL
Qty: 1 TABLET | Refills: 0 | Status: SHIPPED | OUTPATIENT
Start: 2023-12-21

## 2023-12-21 NOTE — TELEPHONE ENCOUNTER
I spoke with patient Nicole segura.  And scheduled Uro now prostate biopsy as per below.  Patient is not on any blood thinners.  All prep and instructions and what to expect after the biopsy explained to patient.  Patient verbalized understanding and repeated back instructions.  Thank you.    NICOLE FRAZIER MRN: [id number]    Date: 1/24/2024 Status: [facility]   Appt Time: 1:00 PM Length: 45   Visit Type: TRUS W/ BIOPSY [2279] Copay: $45.00   Provider: PROCEDURE, UROLOGY ROOM 2       ----- Message from Jose Chavarria MD sent at 12/21/2023  7:12 AM CST -----  Patient needs a URONAV prostate biopsy.  Orders are in.  Please arrange.

## 2024-01-09 ENCOUNTER — PATIENT MESSAGE (OUTPATIENT)
Dept: ADMINISTRATIVE | Facility: OTHER | Age: 74
End: 2024-01-09
Payer: MEDICARE

## 2024-01-24 ENCOUNTER — PROCEDURE VISIT (OUTPATIENT)
Dept: UROLOGY | Facility: CLINIC | Age: 74
End: 2024-01-24
Payer: MEDICARE

## 2024-01-24 VITALS
TEMPERATURE: 98 F | DIASTOLIC BLOOD PRESSURE: 88 MMHG | RESPIRATION RATE: 16 BRPM | HEART RATE: 85 BPM | SYSTOLIC BLOOD PRESSURE: 150 MMHG | WEIGHT: 182.31 LBS | BODY MASS INDEX: 26.92 KG/M2

## 2024-01-24 DIAGNOSIS — R97.20 PSA ELEVATION: Primary | ICD-10-CM

## 2024-01-24 PROCEDURE — 76872 US TRANSRECTAL: CPT | Mod: 26,HCNC,S$GLB, | Performed by: UROLOGY

## 2024-01-24 PROCEDURE — G0416 PROSTATE BIOPSY, ANY MTHD: HCPCS | Mod: 26,HCNC,, | Performed by: PATHOLOGY

## 2024-01-24 PROCEDURE — 96372 THER/PROPH/DIAG INJ SC/IM: CPT | Mod: HCNC,59,S$GLB, | Performed by: UROLOGY

## 2024-01-24 PROCEDURE — 88305 TISSUE EXAM BY PATHOLOGIST: CPT | Mod: 59,HCNC | Performed by: PATHOLOGY

## 2024-01-24 PROCEDURE — 55700 PR BIOPSY OF PROSTATE,NEEDLE/PUNCH: CPT | Mod: HCNC,S$GLB,, | Performed by: UROLOGY

## 2024-01-24 RX ORDER — CEFTRIAXONE 1 G/1
1 INJECTION, POWDER, FOR SOLUTION INTRAMUSCULAR; INTRAVENOUS
Status: COMPLETED | OUTPATIENT
Start: 2024-01-24 | End: 2024-01-24

## 2024-01-24 RX ORDER — LIDOCAINE HYDROCHLORIDE 10 MG/ML
20 INJECTION INFILTRATION; PERINEURAL
Status: COMPLETED | OUTPATIENT
Start: 2024-01-24 | End: 2024-01-24

## 2024-01-24 RX ORDER — LIDOCAINE HYDROCHLORIDE 20 MG/ML
JELLY TOPICAL
Status: COMPLETED | OUTPATIENT
Start: 2024-01-24 | End: 2024-01-24

## 2024-01-24 RX ADMIN — LIDOCAINE HYDROCHLORIDE 20 ML: 10 INJECTION INFILTRATION; PERINEURAL at 01:01

## 2024-01-24 RX ADMIN — LIDOCAINE HYDROCHLORIDE: 20 JELLY TOPICAL at 01:01

## 2024-01-24 RX ADMIN — CEFTRIAXONE 1 G: 1 INJECTION, POWDER, FOR SOLUTION INTRAMUSCULAR; INTRAVENOUS at 01:01

## 2024-01-24 NOTE — PROCEDURES
Pre-procedure diagnosis:   1. elevated psa  Lab Results   Component Value Date    PSA 7.4 (H) 04/11/2022    PSA 5.6 (H) 04/16/2021    PSA 4.0 11/14/2019    PSA 3.5 10/18/2018    PSA 3.2 04/20/2015    PSADIAG 7.0 (H) 11/14/2023    PSADIAG 6.3 (H) 11/07/2022    PSADIAG 4.8 (H) 06/14/2021       2. PIRADS 3 prostate lesion on MRI; 1.3cm left apex/anterior      Post-procedure diagnosis: same    Procedure: Transrectal URONAV MRI fusion-ultrasound guided prostate biopsy    TRUS volume: 36cc  MRI volume: 42.5cc    Complications: none    Blood loss: minimal    Surgeon: Jose Chavarria MD    Anesthesia: 10cc lidocaine jelly, 20cc 1% lidocaine for prostatic block    Procedure: The risks and benefits of the procedure were explained to the patient and consent was obtained.  The patient laid in the lateral decubitus position.  10cc of lidocaine jelly was used for local analgesia in the rectum.  The ultrasound probe was advanced into the rectum. The prostate was visualized.  There was not a median lobe.  20cc of 1% lidocaine without epi was used for a prostatic nerve block.    At this point, a sweep of the prostate was performed from base to apex and the transverse plane using the ultrasound and URONAV platform.  Landmarks were then labeled with anterior, posterior, right, left, base and apex were labeled in the axial as well as sagittal planes.  Segmentation was then performed and manual adjustments were made as needed starting in the sagittal plane followed by the axial plane.  At this point, alignment of the ultrasound with MRI images was ensured using the toggle between ultrasound and MRI.      At this point elastic deformation was computed.  Our images were satisfactory.  At this point, we performed 3 biopsies of a target lesion.  Subsequent to that, a standard 12core biopsy was performed, 2 from the apex, mid and base from the right and left side.    The patient tolerated the procedure well and was discharged home.  We  will call him when the results are available for review.

## 2024-01-24 NOTE — PATIENT INSTRUCTIONS
What to Expect After a Prostate Biopsy    You may have mild bleeding from the rectum or urine for about 1 week to 1 month, or in your ejaculate for several months. This bleeding is normal and expected, and it will stop. You may have mild discomfort in your rectal or urethral area for 24-48 hours.    You cannot do any strenuous lifting, straining, or exercising for 24 hours. You may return to full activity the day after the biopsy.    You may continue to take all your regular medications after the procedure except for the blood thinners.    You may resume all blood-thinning medications once you no longer see any bleeding or whenever your physician prescribing the medication says it is all right to do so. You may take Tylenol if you have a fever and your temperature is less than 100° F or if you have some discomfort.    You will receive a call from the Urology Department at [facility] with the results of your prostate biopsy within one week.    Signs and Symptoms to Report    Call your [facility] urologist at [phone] if you develop any of the following:  Temperature greater than 101°  F  Inability to urinate  A large amount of bleeding from the rectum or in the urine  Persistent or severe pain    After hours or on weekends, you may reach a urology resident on call at this number: [phone].

## 2024-01-26 LAB
FINAL PATHOLOGIC DIAGNOSIS: NORMAL
GROSS: NORMAL
Lab: NORMAL
MICROSCOPIC EXAM: NORMAL

## 2024-01-29 DIAGNOSIS — C61 PROSTATE CANCER: Primary | ICD-10-CM

## 2024-02-05 ENCOUNTER — OFFICE VISIT (OUTPATIENT)
Dept: INTERNAL MEDICINE | Facility: CLINIC | Age: 74
End: 2024-02-05
Payer: MEDICARE

## 2024-02-05 VITALS
SYSTOLIC BLOOD PRESSURE: 138 MMHG | DIASTOLIC BLOOD PRESSURE: 82 MMHG | WEIGHT: 183 LBS | OXYGEN SATURATION: 96 % | HEIGHT: 69 IN | HEART RATE: 88 BPM | BODY MASS INDEX: 27.11 KG/M2

## 2024-02-05 DIAGNOSIS — F32.5 MAJOR DEPRESSIVE DISORDER WITH SINGLE EPISODE, IN FULL REMISSION: ICD-10-CM

## 2024-02-05 DIAGNOSIS — I70.0 AORTIC ATHEROSCLEROSIS: ICD-10-CM

## 2024-02-05 DIAGNOSIS — J43.2 CENTRILOBULAR EMPHYSEMA: ICD-10-CM

## 2024-02-05 DIAGNOSIS — J44.9 CHRONIC OBSTRUCTIVE PULMONARY DISEASE, UNSPECIFIED COPD TYPE: ICD-10-CM

## 2024-02-05 DIAGNOSIS — R26.9 ABNORMALITY OF GAIT AND MOBILITY: ICD-10-CM

## 2024-02-05 DIAGNOSIS — N18.31 STAGE 3A CHRONIC KIDNEY DISEASE: ICD-10-CM

## 2024-02-05 DIAGNOSIS — I10 ESSENTIAL HYPERTENSION: ICD-10-CM

## 2024-02-05 DIAGNOSIS — C61 PROSTATE CANCER: ICD-10-CM

## 2024-02-05 DIAGNOSIS — D64.9 ANEMIA, UNSPECIFIED TYPE: ICD-10-CM

## 2024-02-05 DIAGNOSIS — Z00.00 ENCOUNTER FOR PREVENTIVE HEALTH EXAMINATION: Primary | ICD-10-CM

## 2024-02-05 PROCEDURE — 1126F AMNT PAIN NOTED NONE PRSNT: CPT | Mod: HCNC,CPTII,S$GLB, | Performed by: NURSE PRACTITIONER

## 2024-02-05 PROCEDURE — 1170F FXNL STATUS ASSESSED: CPT | Mod: HCNC,CPTII,S$GLB, | Performed by: NURSE PRACTITIONER

## 2024-02-05 PROCEDURE — 1160F RVW MEDS BY RX/DR IN RCRD: CPT | Mod: HCNC,CPTII,S$GLB, | Performed by: NURSE PRACTITIONER

## 2024-02-05 PROCEDURE — G0439 PPPS, SUBSEQ VISIT: HCPCS | Mod: HCNC,S$GLB,, | Performed by: NURSE PRACTITIONER

## 2024-02-05 PROCEDURE — 3079F DIAST BP 80-89 MM HG: CPT | Mod: HCNC,CPTII,S$GLB, | Performed by: NURSE PRACTITIONER

## 2024-02-05 PROCEDURE — 1101F PT FALLS ASSESS-DOCD LE1/YR: CPT | Mod: HCNC,CPTII,S$GLB, | Performed by: NURSE PRACTITIONER

## 2024-02-05 PROCEDURE — 3075F SYST BP GE 130 - 139MM HG: CPT | Mod: HCNC,CPTII,S$GLB, | Performed by: NURSE PRACTITIONER

## 2024-02-05 PROCEDURE — 99999 PR PBB SHADOW E&M-EST. PATIENT-LVL IV: CPT | Mod: PBBFAC,HCNC,, | Performed by: NURSE PRACTITIONER

## 2024-02-05 PROCEDURE — 1159F MED LIST DOCD IN RCRD: CPT | Mod: HCNC,CPTII,S$GLB, | Performed by: NURSE PRACTITIONER

## 2024-02-05 PROCEDURE — 3288F FALL RISK ASSESSMENT DOCD: CPT | Mod: HCNC,CPTII,S$GLB, | Performed by: NURSE PRACTITIONER

## 2024-02-05 NOTE — PROGRESS NOTES
"William Bueno presented for a follow-up Medicare AWV today. The following components were reviewed and updated:    Medical history  Family History  Social history  Allergies and Current Medications  Health Risk Assessment  Health Maintenance  Care Team    **See Completed Assessments for Annual Wellness visit with in the encounter summary    The following assessments were completed:  Depression Screening  Cognitive function Screening    Timed Get Up Test  Whisper Test      Opioid documentation:      Patient does not have a current opioid prescription.          Vitals:    02/05/24 1233 02/05/24 1309   BP: (!) 142/88 138/82   BP Location: Left arm    Pulse: 88    SpO2: 96%    Weight: 83 kg (182 lb 15.7 oz)    Height: 5' 9" (1.753 m)      Body mass index is 27.02 kg/m².       Physical Exam  Vitals reviewed.   Constitutional:       Appearance: Normal appearance.   HENT:      Head: Normocephalic.   Cardiovascular:      Rate and Rhythm: Normal rate.   Pulmonary:      Effort: Pulmonary effort is normal.   Abdominal:      General: Bowel sounds are normal.   Musculoskeletal:         General: Normal range of motion.      Right lower leg: No edema.      Left lower leg: No edema.   Skin:     General: Skin is warm and dry.      Capillary Refill: Capillary refill takes less than 2 seconds.   Neurological:      Mental Status: He is alert and oriented to person, place, and time.   Psychiatric:         Mood and Affect: Mood normal.         Behavior: Behavior normal.         Thought Content: Thought content normal.         Judgment: Judgment normal.           Diagnoses and health risks identified today and associated recommendations/orders:  1. Encounter for preventive health examination  Assessments completed.  HM recommendations reviewed. Discussed rsv vaccine.  F/u with PCP as instructed.    2. Centrilobular emphysema  Chronic, stable on current regimen. Followed by PCP / pulmonology.    3. Chronic obstructive pulmonary disease, " unspecified COPD type  Chronic, stable on current regimen. Followed by PCP / pulmonology.    4. Stage 3a chronic kidney disease  Chronic, stable on current regimen. Followed by PCP.    5. Major depressive disorder with single episode, in full remission  Chronic, stable on current regimen. Followed by PCP.    6. Aortic atherosclerosis  Chronic, stable on current regimen. Followed by PCP. On statin.    7. Prostate cancer  Chronic, stable on current regimen. Followed by urology.    8. Essential hypertension  Chronic, stable on current regimen. Followed by PCP.    9. Anemia, unspecified type  Chronic, stable on current regimen. Followed by PCP.    10. BMI 27.0-27.9,adult  Chronic, stable on current regimen. Followed by PCP.    11. Abnormality of gait and mobility  Chronic, stable. No recent falls. Does not use assistive devices. Followed by PCP.       Provided William with a 5-10 year written screening schedule and personal prevention plan. Recommendations were developed using the USPSTF age appropriate recommendations. Education, counseling, and referrals were provided as needed.  After Visit Summary printed and given to patient which includes a list of additional screenings\tests needed.    Follow up in about 1 year (around 2/5/2025) for Medicare AWV and with PCP as instructed.       Deedee Hargrove, YESY    I offered to discuss advanced care planning, including how to pick a person who would make decisions for you if you were unable to make them for yourself, called a health care power of , and what kind of decisions you might make such as use of life sustaining treatments such as ventilators and tube feeding when faced with a life limiting illness recorded on a living will that they will need to know. (How you want to be cared for as you near the end of your natural life)     X Patient is interested in learning more about how to make advanced directives.  I provided them paperwork and offered to discuss this with  them.

## 2024-02-05 NOTE — PATIENT INSTRUCTIONS
1. Next annual with Lexie Lares MD around July 2024.    2. Can consider rsv vaccine, available at pharmacy.    Counseling and Referral of Other Preventative  (Italic type indicates deductible and co-insurance are waived)    Patient Name: William Bueno  Today's Date: 2/5/2024    Health Maintenance       Date Due Completion Date    RSV Vaccine (Age 60+ and Pregnant patients) (1 - 1-dose 60+ series) Never done ---    COVID-19 Vaccine (7 - 2023-24 season) 11/28/2023 10/3/2023    LDCT Lung Screen 09/20/2024 9/20/2023    High Dose Statin 01/24/2025 1/24/2024    Colorectal Cancer Screening 06/22/2025 6/22/2020    Lipid Panel 11/14/2028 11/14/2023    TETANUS VACCINE 10/27/2031 10/27/2021        No orders of the defined types were placed in this encounter.      The following information is provided to all patients.  This information is to help you find resources for any of the problems found today that may be affecting your health:                  Living healthy guide: www.[facility].[place].gov      Understanding Diabetes: www.diabetes.org      Eating healthy: www.cdc.gov/healthyweight      CDC home safety checklist: www.cdc.gov/steadi/patient.html      Agency on Aging: www.goea.[place].gov      Alcoholics anonymous (AA): www.aa.org      Physical Activity: www.[place].nih.gov/aj0bkjb      Tobacco use: www.quitwithusla.org

## 2024-04-16 RX ORDER — FELODIPINE 5 MG/1
5 TABLET, EXTENDED RELEASE ORAL DAILY
Qty: 90 TABLET | Refills: 0 | Status: SHIPPED | OUTPATIENT
Start: 2024-04-16

## 2024-04-16 NOTE — TELEPHONE ENCOUNTER
No care due was identified.  [facility] Embedded Care Due Messages. Reference number: [id number].   4/16/2024 12:22:26 PM CDT

## 2024-04-17 NOTE — TELEPHONE ENCOUNTER
Refill Decision Note   William Bueno  is requesting a refill authorization.  Brief Assessment and Rationale for Refill:  Approve     Medication Therapy Plan:         Comments:     Note composed:9:41 PM 04/16/2024

## 2024-06-03 ENCOUNTER — LAB VISIT (OUTPATIENT)
Dept: LAB | Facility: HOSPITAL | Age: 74
End: 2024-06-03
Payer: MEDICARE

## 2024-06-03 DIAGNOSIS — I10 ESSENTIAL HYPERTENSION: ICD-10-CM

## 2024-06-03 LAB
ALBUMIN SERPL BCP-MCNC: 3.9 G/DL (ref 3.5–5.2)
ALP SERPL-CCNC: 135 U/L (ref 55–135)
ALT SERPL W/O P-5'-P-CCNC: 18 U/L (ref 10–44)
ANION GAP SERPL CALC-SCNC: 8 MMOL/L (ref 8–16)
AST SERPL-CCNC: 19 U/L (ref 10–40)
BILIRUB SERPL-MCNC: 0.6 MG/DL (ref 0.1–1)
BUN SERPL-MCNC: 15 MG/DL (ref 8–23)
CALCIUM SERPL-MCNC: 9.9 MG/DL (ref 8.7–10.5)
CHLORIDE SERPL-SCNC: 105 MMOL/L (ref 95–110)
CO2 SERPL-SCNC: 24 MMOL/L (ref 23–29)
CREAT SERPL-MCNC: 1.6 MG/DL (ref 0.5–1.4)
EST. GFR  (NO RACE VARIABLE): 45.5 ML/MIN/1.73 M^2
GLUCOSE SERPL-MCNC: 97 MG/DL (ref 70–110)
POTASSIUM SERPL-SCNC: 4.9 MMOL/L (ref 3.5–5.1)
PROT SERPL-MCNC: 8 G/DL (ref 6–8.4)
SODIUM SERPL-SCNC: 137 MMOL/L (ref 136–145)

## 2024-06-03 PROCEDURE — 80053 COMPREHEN METABOLIC PANEL: CPT | Mod: HCNC | Performed by: INTERNAL MEDICINE

## 2024-06-03 PROCEDURE — 36415 COLL VENOUS BLD VENIPUNCTURE: CPT | Mod: HCNC | Performed by: INTERNAL MEDICINE

## 2024-06-10 ENCOUNTER — PATIENT MESSAGE (OUTPATIENT)
Dept: INTERNAL MEDICINE | Facility: CLINIC | Age: 74
End: 2024-06-10
Payer: MEDICARE

## 2024-07-08 ENCOUNTER — PATIENT MESSAGE (OUTPATIENT)
Dept: ADMINISTRATIVE | Facility: OTHER | Age: 74
End: 2024-07-08
Payer: MEDICARE

## 2024-07-08 ENCOUNTER — ON-DEMAND VIRTUAL (OUTPATIENT)
Dept: URGENT CARE | Facility: CLINIC | Age: 74
End: 2024-07-08
Payer: MEDICARE

## 2024-07-08 DIAGNOSIS — R05.1 ACUTE COUGH: Primary | ICD-10-CM

## 2024-07-08 PROCEDURE — 99213 OFFICE O/P EST LOW 20 MIN: CPT | Mod: 95,,, | Performed by: NURSE PRACTITIONER

## 2024-07-08 RX ORDER — PREDNISONE 20 MG/1
20 TABLET ORAL DAILY
Qty: 5 TABLET | Refills: 0 | Status: SHIPPED | OUTPATIENT
Start: 2024-07-08 | End: 2024-07-13

## 2024-07-08 NOTE — PROGRESS NOTES
Subjective:      Patient ID: William Bueno is a 73 y.o. male.    Vitals:  vitals were not taken for this visit.     Chief Complaint: Sore Throat      Visit Type: TELE AUDIOVISUAL    Present with the patient at the time of consultation: TELEMED PRESENT WITH PATIENT: None        Past Medical History:   Diagnosis Date    Depression     Emphysema of lung     Hypertension      Past Surgical History:   Procedure Laterality Date    COLONOSCOPY      COLONOSCOPY N/A 6/22/2020    Procedure: COLONOSCOPY;  Surgeon: Woody Mckeon MD;  Location: [address];  Service: Endoscopy;  Laterality: N/A;  covid test [facility] urgent care 6/19    HERNIA REPAIR Right 2017    open [facility] with mesh    TONSILLECTOMY       Review of patient's allergies indicates:  No Known Allergies  Current Outpatient Medications on File Prior to Visit   Medication Sig Dispense Refill    atorvastatin (LIPITOR) 40 MG tablet Take 1 tablet (40 mg total) by mouth once daily. 90 tablet 3    ciprofloxacin HCl (CIPRO) 500 MG tablet 1 pill one hour before procedure (Patient not taking: Reported on 2/5/2024) 1 tablet 0    felodipine (PLENDIL) 5 MG 24 hr tablet Take 1 tablet (5 mg total) by mouth once daily. 90 tablet 0    hydroCHLOROthiazide (HYDRODIURIL) 12.5 MG Tab Take 1 tablet (12.5 mg total) by mouth once daily. 30 tablet 2    multivitamin (THERAGRAN) per tablet Take 1 tablet by mouth once daily.      tamsulosin (FLOMAX) 0.4 mg Cap Take 1 capsule (0.4 mg total) by mouth after dinner. 90 capsule 3     No current facility-administered medications on file prior to visit.     Family History   Problem Relation Name Age of Onset    Diabetes Mother      Kidney disease Mother      Heart disease Father          chf    Cancer Brother      Iron deficiency Daughter      No Known Problems Son      No Known Problems Brother         Medications Ordered                [id number] [place] - [address]   [address] [place] [address]    Telephone: [phone]   Fax: [phone]   Hours: Not open 24 hours                         E-Prescribed (1 of 1)              predniSONE (DELTASONE) 20 MG tablet    Sig: Take 1 tablet (20 mg total) by mouth once daily. for 5 days       Start: 7/8/24     Quantity: 5 tablet Refills: 0                           Ohs Peq Odvv Intake    7/8/2024  8:05 AM CDT - Filed by Patient   What is your current physical address in the event of a medical emergency? [address] [place] [address]   Are you able to take your vital signs? No   Please attach any relevant images or files          72 yo male with c/o sore throat and cough for the past several days. He has hx of copd. He denies fever, chest congestion, sob and wheezing. He has tried cough drops without relief.         Constitution: Negative.   HENT:  Positive for sore throat.    Cardiovascular: Negative.    Eyes: Negative.    Respiratory:  Positive for cough.    Gastrointestinal: Negative.  Negative for bowel incontinence.   Endocrine: negative.   Genitourinary: Negative.  Negative for dysuria, flank pain, bladder incontinence and pelvic pain.   Musculoskeletal: Negative.  Negative for pain, abnormal ROM of joint and back pain.   Skin: Negative.    Allergic/Immunologic: Negative.    Neurological: Negative.    Hematologic/Lymphatic: Negative.    Psychiatric/Behavioral: Negative.          Objective:   The physical exam was conducted virtually.  LOCATION OF PATIENT home  Physical Exam   Constitutional: He is oriented to person, place, and time. He appears well-developed.   HENT:   Head: Normocephalic and atraumatic.   Ears:   Right Ear: Hearing, tympanic membrane and external ear normal.   Left Ear: Hearing, tympanic membrane and external ear normal.   Nose: Nose normal.   Mouth/Throat: Uvula is midline, oropharynx is clear and moist and mucous membranes are normal.   Eyes: Conjunctivae and EOM are normal. Pupils are equal, round, and reactive to light.   Neck: Neck  supple.   Cardiovascular: Normal rate.   Pulmonary/Chest: Effort normal and breath sounds normal.   Musculoskeletal: Normal range of motion.         General: Normal range of motion.   Neurological: He is alert and oriented to person, place, and time.   Skin: Skin is warm.   Psychiatric: His behavior is normal. Thought content normal.   Nursing note and vitals reviewed.      Assessment:     1. Acute cough        Plan:   PLEASE READ YOUR DISCHARGE INSTRUCTIONS ENTIRELY AS IT CONTAINS IMPORTANT INFORMATION.      Please take your steroids to completion.       Please return or see your primary care doctor if you develop new or worsening symptoms.     Please arrange follow up with your primary medical clinic as soon as possible. You must understand that you've received an Urgent Care treatment only and that you may be released before all of your medical problems are known or treated. You, the patient, will arrange for follow up as instructed. If your symptoms worsen or fail to improve you should go to the Emergency Room.     Acute cough  -     predniSONE (DELTASONE) 20 MG tablet; Take 1 tablet (20 mg total) by mouth once daily. for 5 days  Dispense: 5 tablet; Refill: 0

## 2024-07-15 NOTE — PROGRESS NOTES
Subjective:      Patient ID: William Bueno is a 73 y.o. male.    Chief Complaint: prostate cancer    HPI  Patient is a 73 y.o. male who is established to our clinic and referred by their PCP, Dr. Tesfaye for evaluation of bph/elevated psa.  Patient reports significant improvement in his LUTs since starting on flomax previously.  He is happy with his urinary symptoms.   He does report anejaculation with the flomax but this is not bothersome to him.      He quit smoking last year.   Reports a 30lb weight gain.       Of note, he underwent a prostate MRI on 7/1/21 (report in media).  MRI prostate volume 37.6cc. no prostate lesions.  His psa remained elevated and subsequently underwent a repeat MRI of the prostate on 12/15/23, which revealed a prostate volume of 43 cc, 1.3 cm left apex TZ PI-RADS 3 lesion.  Ultimately underwent a URONAV prostate biopsy on 1/24/24---revealed low volume Nino 3+3 prostate cancer.       He denies any bone pain or unexpected weight loss.  He denies any fh of prostate cancer.    BIOPSY PATHOLOGY 1/24/24:  Final Pathologic Diagnosis PROSTATE BIOPSIES 1, 2, 3, 4, AND 6:  NO CARCINOMA IDENTIFIED    7.  BIOPSY OF TARGET LESION:    A FEW SOMEWHAT SUSPICIOUS GLANDS AT SCATTERED LEVELS IN THE SLIDES--SEE DESCRIPTION    5. BIOPSY OF RIGHT MIDDLE PROSTATE:  ADENOCARCINOMA TOTAL NINO SCORE 6 (3+3)--GRADE GROUP 1  THE CARCINOMA INVOLVES 15% OF THIS TOTAL SPECIMEN, WITH INVOLVEMENT OF BOTH CORES.        Lab Results   Component Value Date    PSA 7.4 (H) 04/11/2022    PSA 5.6 (H) 04/16/2021    PSA 4.0 11/14/2019    PSA 3.5 10/18/2018    PSA 3.2 04/20/2015    PSADIAG 7.0 (H) 11/14/2023    PSADIAG 6.3 (H) 11/07/2022    PSADIAG 4.8 (H) 06/14/2021         Review of Systems   All other systems reviewed and negative except pertinent positives noted in HPI.    Objective:      Physical Exam  Constitutional:       General: He is not in acute distress.     Appearance: He is well-developed.   HENT:      Head:  Normocephalic and atraumatic.   Eyes:      General: No scleral icterus.  Neck:      Trachea: No tracheal deviation.   Pulmonary:      Effort: Pulmonary effort is normal. No respiratory distress.   Neurological:      Mental Status: He is alert and oriented to person, place, and time.   Psychiatric:         Behavior: Behavior normal.         Thought Content: Thought content normal.         Judgment: Judgment normal.         Assessment:       1. Prostate cancer    2. Benign non-nodular prostatic hyperplasia with lower urinary tract symptoms    3. Primary hypertension    4. Erectile dysfunction due to arterial insufficiency              Plan:     1. Prostate cancer    2. Benign non-nodular prostatic hyperplasia with lower urinary tract symptoms    3. Primary hypertension    4. Erectile dysfunction due to arterial insufficiency              No orders of the defined types were placed in this encounter.        1. Prostate cancer:  -continue active surveillance  -psa pending from today. Will notify of results.   -plan for psa in 6 months as well as URONAV biopsy for confirmatory biopsy per active surveillance protocol.  -told patient to reach out 1-2 weeks prior to biopsy to request enema and antibiotic.     2. BPH:  -symptoms controlled  -continue Flomax     3. ED:  Viagra prescribed.   Side effects discussed.       4 HTN:  -BP reviewed today and elevated  -continue current medications  -encouraged f/u and discussion of BP with PCP.

## 2024-07-16 ENCOUNTER — LAB VISIT (OUTPATIENT)
Dept: LAB | Facility: HOSPITAL | Age: 74
End: 2024-07-16
Attending: UROLOGY
Payer: MEDICARE

## 2024-07-16 ENCOUNTER — OFFICE VISIT (OUTPATIENT)
Dept: UROLOGY | Facility: CLINIC | Age: 74
End: 2024-07-16
Payer: MEDICARE

## 2024-07-16 VITALS
SYSTOLIC BLOOD PRESSURE: 116 MMHG | BODY MASS INDEX: 25.8 KG/M2 | HEART RATE: 84 BPM | HEIGHT: 69 IN | WEIGHT: 174.19 LBS | DIASTOLIC BLOOD PRESSURE: 70 MMHG

## 2024-07-16 DIAGNOSIS — C61 PROSTATE CANCER: Primary | ICD-10-CM

## 2024-07-16 DIAGNOSIS — N40.1 BENIGN NON-NODULAR PROSTATIC HYPERPLASIA WITH LOWER URINARY TRACT SYMPTOMS: ICD-10-CM

## 2024-07-16 DIAGNOSIS — C61 PROSTATE CANCER: ICD-10-CM

## 2024-07-16 DIAGNOSIS — N52.01 ERECTILE DYSFUNCTION DUE TO ARTERIAL INSUFFICIENCY: ICD-10-CM

## 2024-07-16 DIAGNOSIS — I10 PRIMARY HYPERTENSION: ICD-10-CM

## 2024-07-16 LAB — COMPLEXED PSA SERPL-MCNC: 12.7 NG/ML (ref 0–4)

## 2024-07-16 PROCEDURE — 3074F SYST BP LT 130 MM HG: CPT | Mod: HCNC,CPTII,S$GLB, | Performed by: UROLOGY

## 2024-07-16 PROCEDURE — 36415 COLL VENOUS BLD VENIPUNCTURE: CPT | Mod: HCNC | Performed by: UROLOGY

## 2024-07-16 PROCEDURE — 99999 PR PBB SHADOW E&M-EST. PATIENT-LVL III: CPT | Mod: PBBFAC,HCNC,, | Performed by: UROLOGY

## 2024-07-16 PROCEDURE — 1101F PT FALLS ASSESS-DOCD LE1/YR: CPT | Mod: HCNC,CPTII,S$GLB, | Performed by: UROLOGY

## 2024-07-16 PROCEDURE — 1160F RVW MEDS BY RX/DR IN RCRD: CPT | Mod: HCNC,CPTII,S$GLB, | Performed by: UROLOGY

## 2024-07-16 PROCEDURE — 1126F AMNT PAIN NOTED NONE PRSNT: CPT | Mod: HCNC,CPTII,S$GLB, | Performed by: UROLOGY

## 2024-07-16 PROCEDURE — 3008F BODY MASS INDEX DOCD: CPT | Mod: HCNC,CPTII,S$GLB, | Performed by: UROLOGY

## 2024-07-16 PROCEDURE — 84153 ASSAY OF PSA TOTAL: CPT | Mod: HCNC | Performed by: UROLOGY

## 2024-07-16 PROCEDURE — 1159F MED LIST DOCD IN RCRD: CPT | Mod: HCNC,CPTII,S$GLB, | Performed by: UROLOGY

## 2024-07-16 PROCEDURE — 99214 OFFICE O/P EST MOD 30 MIN: CPT | Mod: HCNC,S$GLB,, | Performed by: UROLOGY

## 2024-07-16 PROCEDURE — 3288F FALL RISK ASSESSMENT DOCD: CPT | Mod: HCNC,CPTII,S$GLB, | Performed by: UROLOGY

## 2024-07-16 PROCEDURE — 3078F DIAST BP <80 MM HG: CPT | Mod: HCNC,CPTII,S$GLB, | Performed by: UROLOGY

## 2024-07-16 RX ORDER — FAMOTIDINE 40 MG/1
40 TABLET, FILM COATED ORAL 2 TIMES DAILY
COMMUNITY
Start: 2024-02-24

## 2024-07-16 RX ORDER — SILDENAFIL 100 MG/1
100 TABLET, FILM COATED ORAL DAILY PRN
Qty: 10 TABLET | Refills: 11 | Status: SHIPPED | OUTPATIENT
Start: 2024-07-16 | End: 2025-07-16

## 2024-08-06 ENCOUNTER — LAB VISIT (OUTPATIENT)
Dept: LAB | Facility: HOSPITAL | Age: 74
End: 2024-08-06
Attending: INTERNAL MEDICINE
Payer: MEDICARE

## 2024-08-06 ENCOUNTER — OFFICE VISIT (OUTPATIENT)
Dept: INTERNAL MEDICINE | Facility: CLINIC | Age: 74
End: 2024-08-06
Payer: MEDICARE

## 2024-08-06 VITALS
BODY MASS INDEX: 24.95 KG/M2 | HEIGHT: 69 IN | WEIGHT: 168.44 LBS | SYSTOLIC BLOOD PRESSURE: 104 MMHG | DIASTOLIC BLOOD PRESSURE: 72 MMHG | HEART RATE: 95 BPM | OXYGEN SATURATION: 97 %

## 2024-08-06 DIAGNOSIS — C61 PROSTATE CANCER: ICD-10-CM

## 2024-08-06 DIAGNOSIS — N18.31 STAGE 3A CHRONIC KIDNEY DISEASE: ICD-10-CM

## 2024-08-06 DIAGNOSIS — I10 ESSENTIAL HYPERTENSION: ICD-10-CM

## 2024-08-06 DIAGNOSIS — E78.5 HYPERLIPIDEMIA, UNSPECIFIED HYPERLIPIDEMIA TYPE: ICD-10-CM

## 2024-08-06 DIAGNOSIS — R06.09 EXERTIONAL DYSPNEA: ICD-10-CM

## 2024-08-06 DIAGNOSIS — D64.9 ANEMIA, UNSPECIFIED TYPE: ICD-10-CM

## 2024-08-06 DIAGNOSIS — Z87.891 HISTORY OF TOBACCO USE: ICD-10-CM

## 2024-08-06 DIAGNOSIS — R06.00 DYSPNEA, UNSPECIFIED TYPE: Primary | ICD-10-CM

## 2024-08-06 DIAGNOSIS — Z00.00 ANNUAL PHYSICAL EXAM: ICD-10-CM

## 2024-08-06 DIAGNOSIS — J43.2 CENTRILOBULAR EMPHYSEMA: ICD-10-CM

## 2024-08-06 LAB
CHOLEST SERPL-MCNC: 126 MG/DL (ref 120–199)
CHOLEST/HDLC SERPL: 2.6 {RATIO} (ref 2–5)
CREAT UR-MCNC: 362 MG/DL (ref 23–375)
ERYTHROCYTE [DISTWIDTH] IN BLOOD BY AUTOMATED COUNT: 13.5 % (ref 11.5–14.5)
HCT VFR BLD AUTO: 40 % (ref 40–54)
HDLC SERPL-MCNC: 49 MG/DL (ref 40–75)
HDLC SERPL: 38.9 % (ref 20–50)
HGB BLD-MCNC: 12.9 G/DL (ref 14–18)
LDLC SERPL CALC-MCNC: 61.8 MG/DL (ref 63–159)
MCH RBC QN AUTO: 29.8 PG (ref 27–31)
MCHC RBC AUTO-ENTMCNC: 32.3 G/DL (ref 32–36)
MCV RBC AUTO: 92 FL (ref 82–98)
NONHDLC SERPL-MCNC: 77 MG/DL
PLATELET # BLD AUTO: 344 K/UL (ref 150–450)
PMV BLD AUTO: 9.8 FL (ref 9.2–12.9)
PROT UR-MCNC: 18 MG/DL (ref 0–15)
PROT/CREAT UR: 0.05 MG/G{CREAT} (ref 0–0.2)
RBC # BLD AUTO: 4.33 M/UL (ref 4.6–6.2)
TRIGL SERPL-MCNC: 76 MG/DL (ref 30–150)
WBC # BLD AUTO: 5.82 K/UL (ref 3.9–12.7)

## 2024-08-06 PROCEDURE — 80061 LIPID PANEL: CPT | Mod: HCNC | Performed by: INTERNAL MEDICINE

## 2024-08-06 PROCEDURE — 3074F SYST BP LT 130 MM HG: CPT | Mod: HCNC,CPTII,S$GLB, | Performed by: INTERNAL MEDICINE

## 2024-08-06 PROCEDURE — 3008F BODY MASS INDEX DOCD: CPT | Mod: HCNC,CPTII,S$GLB, | Performed by: INTERNAL MEDICINE

## 2024-08-06 PROCEDURE — 36415 COLL VENOUS BLD VENIPUNCTURE: CPT | Mod: HCNC | Performed by: INTERNAL MEDICINE

## 2024-08-06 PROCEDURE — 1126F AMNT PAIN NOTED NONE PRSNT: CPT | Mod: HCNC,CPTII,S$GLB, | Performed by: INTERNAL MEDICINE

## 2024-08-06 PROCEDURE — 3078F DIAST BP <80 MM HG: CPT | Mod: HCNC,CPTII,S$GLB, | Performed by: INTERNAL MEDICINE

## 2024-08-06 PROCEDURE — 82570 ASSAY OF URINE CREATININE: CPT | Mod: HCNC | Performed by: INTERNAL MEDICINE

## 2024-08-06 PROCEDURE — 99397 PER PM REEVAL EST PAT 65+ YR: CPT | Mod: HCNC,S$GLB,, | Performed by: INTERNAL MEDICINE

## 2024-08-06 PROCEDURE — 99999 PR PBB SHADOW E&M-EST. PATIENT-LVL III: CPT | Mod: PBBFAC,HCNC,, | Performed by: INTERNAL MEDICINE

## 2024-08-06 PROCEDURE — 3288F FALL RISK ASSESSMENT DOCD: CPT | Mod: HCNC,CPTII,S$GLB, | Performed by: INTERNAL MEDICINE

## 2024-08-06 PROCEDURE — 85027 COMPLETE CBC AUTOMATED: CPT | Mod: HCNC | Performed by: INTERNAL MEDICINE

## 2024-08-06 PROCEDURE — 1159F MED LIST DOCD IN RCRD: CPT | Mod: HCNC,CPTII,S$GLB, | Performed by: INTERNAL MEDICINE

## 2024-08-06 PROCEDURE — 1101F PT FALLS ASSESS-DOCD LE1/YR: CPT | Mod: HCNC,CPTII,S$GLB, | Performed by: INTERNAL MEDICINE

## 2024-08-06 RX ORDER — FLUTICASONE FUROATE AND VILANTEROL 200; 25 UG/1; UG/1
1 POWDER RESPIRATORY (INHALATION) DAILY
Qty: 60 EACH | Refills: 11 | Status: SHIPPED | OUTPATIENT
Start: 2024-08-06

## 2024-08-16 ENCOUNTER — LAB VISIT (OUTPATIENT)
Dept: LAB | Facility: HOSPITAL | Age: 74
End: 2024-08-16
Attending: UROLOGY
Payer: MEDICARE

## 2024-08-16 DIAGNOSIS — C61 PROSTATE CANCER: ICD-10-CM

## 2024-08-16 LAB — COMPLEXED PSA SERPL-MCNC: 8 NG/ML (ref 0–4)

## 2024-08-16 PROCEDURE — 84153 ASSAY OF PSA TOTAL: CPT | Mod: HCNC | Performed by: UROLOGY

## 2024-08-16 PROCEDURE — 36415 COLL VENOUS BLD VENIPUNCTURE: CPT | Mod: HCNC | Performed by: UROLOGY

## 2024-08-19 ENCOUNTER — TELEPHONE (OUTPATIENT)
Dept: PULMONOLOGY | Facility: CLINIC | Age: 74
End: 2024-08-19
Payer: MEDICARE

## 2024-08-19 NOTE — TELEPHONE ENCOUNTER
Called and spoke with pt to confirm scheduling of follow up LDCT scan.  Agreeable with scheduling on tomorrow 8/20, instructions given on where to go.

## 2024-08-20 ENCOUNTER — OFFICE VISIT (OUTPATIENT)
Dept: INTERNAL MEDICINE | Facility: CLINIC | Age: 74
End: 2024-08-20
Payer: MEDICARE

## 2024-08-20 ENCOUNTER — HOSPITAL ENCOUNTER (OUTPATIENT)
Dept: RADIOLOGY | Facility: HOSPITAL | Age: 74
Discharge: HOME | End: 2024-08-20
Attending: INTERNAL MEDICINE
Payer: MEDICARE

## 2024-08-20 VITALS
WEIGHT: 168.88 LBS | SYSTOLIC BLOOD PRESSURE: 104 MMHG | HEART RATE: 98 BPM | HEIGHT: 69 IN | BODY MASS INDEX: 25.01 KG/M2 | DIASTOLIC BLOOD PRESSURE: 68 MMHG | OXYGEN SATURATION: 95 %

## 2024-08-20 DIAGNOSIS — I10 ESSENTIAL HYPERTENSION: ICD-10-CM

## 2024-08-20 DIAGNOSIS — Z87.891 HISTORY OF TOBACCO USE: ICD-10-CM

## 2024-08-20 DIAGNOSIS — J43.2 CENTRILOBULAR EMPHYSEMA: Primary | ICD-10-CM

## 2024-08-20 DIAGNOSIS — J44.9 CHRONIC OBSTRUCTIVE PULMONARY DISEASE, UNSPECIFIED COPD TYPE: ICD-10-CM

## 2024-08-20 DIAGNOSIS — N18.31 STAGE 3A CHRONIC KIDNEY DISEASE: ICD-10-CM

## 2024-08-20 DIAGNOSIS — C61 PROSTATE CANCER: ICD-10-CM

## 2024-08-20 DIAGNOSIS — I70.0 AORTIC ATHEROSCLEROSIS: ICD-10-CM

## 2024-08-20 DIAGNOSIS — D64.9 ANEMIA, UNSPECIFIED TYPE: ICD-10-CM

## 2024-08-20 PROCEDURE — 99999 PR PBB SHADOW E&M-EST. PATIENT-LVL III: CPT | Mod: PBBFAC,HCNC,, | Performed by: PHYSICIAN ASSISTANT

## 2024-08-20 PROCEDURE — 1160F RVW MEDS BY RX/DR IN RCRD: CPT | Mod: HCNC,CPTII,S$GLB, | Performed by: PHYSICIAN ASSISTANT

## 2024-08-20 PROCEDURE — 3288F FALL RISK ASSESSMENT DOCD: CPT | Mod: HCNC,CPTII,S$GLB, | Performed by: PHYSICIAN ASSISTANT

## 2024-08-20 PROCEDURE — 3074F SYST BP LT 130 MM HG: CPT | Mod: HCNC,CPTII,S$GLB, | Performed by: PHYSICIAN ASSISTANT

## 2024-08-20 PROCEDURE — 1101F PT FALLS ASSESS-DOCD LE1/YR: CPT | Mod: HCNC,CPTII,S$GLB, | Performed by: PHYSICIAN ASSISTANT

## 2024-08-20 PROCEDURE — 71271 CT THORAX LUNG CANCER SCR C-: CPT | Mod: TC,HCNC

## 2024-08-20 PROCEDURE — 1159F MED LIST DOCD IN RCRD: CPT | Mod: HCNC,CPTII,S$GLB, | Performed by: PHYSICIAN ASSISTANT

## 2024-08-20 PROCEDURE — 1125F AMNT PAIN NOTED PAIN PRSNT: CPT | Mod: HCNC,CPTII,S$GLB, | Performed by: PHYSICIAN ASSISTANT

## 2024-08-20 PROCEDURE — 71271 CT THORAX LUNG CANCER SCR C-: CPT | Mod: 26,HCNC,, | Performed by: STUDENT IN AN ORGANIZED HEALTH CARE EDUCATION/TRAINING PROGRAM

## 2024-08-20 PROCEDURE — 99214 OFFICE O/P EST MOD 30 MIN: CPT | Mod: HCNC,S$GLB,, | Performed by: PHYSICIAN ASSISTANT

## 2024-08-20 PROCEDURE — 3008F BODY MASS INDEX DOCD: CPT | Mod: HCNC,CPTII,S$GLB, | Performed by: PHYSICIAN ASSISTANT

## 2024-08-20 PROCEDURE — 3078F DIAST BP <80 MM HG: CPT | Mod: HCNC,CPTII,S$GLB, | Performed by: PHYSICIAN ASSISTANT

## 2024-08-20 RX ORDER — FAMOTIDINE 20 MG/1
20 TABLET, FILM COATED ORAL 2 TIMES DAILY
COMMUNITY

## 2024-08-20 NOTE — PROGRESS NOTES
Subjective:       Patient ID: William Bueno is a 73 y.o. male.        Chief Complaint: Follow-up    William Bueno is an established patient of Lexie Tesfaye MD here today for follow up visit.    He was supposed to f/u in 3 months, ~11/2024.  He decided he wanted to return sooner.    Last visit 8/6/24 presented with RODGERS.  He had been off Breo, which was restarted.  Stress echo ordered to r/o ischemia.  CT lungs performed today, report pending.    HTN -   Hctz 12.5 mg daily  Plendil 5 mg daily  In digital hypertension with good control    COPD/emphysema, history of tobacco abuse -   Last saw Dr. Nur in 2021 with PFTs showing mild obstruction, CT with emphysematous changes  Breo restarted 8/6/24, already feels some improvement in dyspnea    Prostate cancer -   Dr. Chavarria manages  Active surveillance  Prostate bx 1/2024    BMI 24 -   Working hard to lose weight over the past year and down 12#    GERD -   Pepcid 20 mg daily-BID OTC     Anemia    Lipids -   Lipitor 40 mg daily    CKD 3a -   Creatinine 1.5-1.6  GFR 45-49            Review of Systems   Constitutional:  Negative for appetite change, chills, fatigue and fever.   HENT:  Negative for congestion and sore throat.    Eyes:  Negative for visual disturbance.   Respiratory:  Negative for cough, chest tightness and shortness of breath.    Cardiovascular:  Negative for chest pain, palpitations and leg swelling.   Gastrointestinal:  Negative for abdominal pain, blood in stool, constipation, diarrhea, nausea and vomiting.   Genitourinary:  Negative for dysuria, frequency, hematuria and urgency.   Musculoskeletal:  Negative for arthralgias and back pain.   Skin:  Negative for rash.   Neurological:  Negative for dizziness, syncope, weakness and headaches.   Psychiatric/Behavioral:  Negative for dysphoric mood and sleep disturbance. The patient is not nervous/anxious.        Objective:      Physical Exam  Vitals and nursing note reviewed.   Constitutional:        Appearance: He is well-developed.   HENT:      Head: Normocephalic.      Right Ear: External ear normal.      Left Ear: External ear normal.   Eyes:      Pupils: Pupils are equal, round, and reactive to light.   Cardiovascular:      Rate and Rhythm: Normal rate and regular rhythm.      Heart sounds: Normal heart sounds. No murmur heard.     No friction rub. No gallop.   Pulmonary:      Effort: Pulmonary effort is normal. No respiratory distress.      Breath sounds: Normal breath sounds.   Abdominal:      Palpations: Abdomen is soft.      Tenderness: There is no abdominal tenderness.   Musculoskeletal:         General: No swelling.   Skin:     General: Skin is warm and dry.   Neurological:      General: No focal deficit present.      Mental Status: He is alert.   Psychiatric:         Mood and Affect: Mood normal.         Assessment:       1. Centrilobular emphysema    2. Essential hypertension    3. Aortic atherosclerosis    4. Stage 3a chronic kidney disease    5. Anemia, unspecified type        Plan:       William was seen today for follow-up.    Diagnoses and all orders for this visit:    Centrilobular emphysema, COPD - restarted breo 8/6/24, already seeing some improvement in dyspnea, discussed will need to re assess in 8-12 weeks to monitor response, LDCT from this morning pending result    Essential hypertension - stable and controlled, continue current regimen  BP Readings from Last 5 Encounters:   08/20/24 104/68   08/06/24 104/72   07/16/24 116/70   02/05/24 138/82   01/24/24 (!) 150/88      Aortic atherosclerosis - continue statin    Stage 3a chronic kidney disease - chronic, stable,    Anemia, unspecified type    F/u in 8-12 weeks  Discussed getting covid and flu vaccines next month when available    Pt has been given instructions populated from patient instructions database and has verbalized understanding of the after visit summary and information contained wherein.    Follow up with a primary care  "provider. May go to ER for acute shortness of breath, lightheadedness, fever, or any other emergent complaints or changes in condition.    "This note will be shared with the patient"    Future Appointments   Date Time Provider Department Center   10/21/2024  8:00 AM Olivia Mclean PA-C [facility] FOY   1/13/2025 10:00 AM LAB, HEMON CANCER [facility] LAB TYREE Graves   1/15/2025  1:00 PM PROCEDURE, UROLOGY ROOM 2 [facility] UROPRO Paolo Graves                 "

## 2024-09-02 DIAGNOSIS — N28.1 RENAL CYST, LEFT: Primary | ICD-10-CM

## 2024-09-02 DIAGNOSIS — Z87.891 HISTORY OF TOBACCO USE: ICD-10-CM

## 2024-09-05 DIAGNOSIS — I70.0 AORTIC ATHEROSCLEROSIS: ICD-10-CM

## 2024-09-05 RX ORDER — ATORVASTATIN CALCIUM 40 MG/1
40 TABLET, FILM COATED ORAL DAILY
Qty: 90 TABLET | Refills: 2 | Status: SHIPPED | OUTPATIENT
Start: 2024-09-05

## 2024-09-05 RX ORDER — SILDENAFIL 100 MG/1
100 TABLET, FILM COATED ORAL DAILY PRN
Qty: 10 TABLET | Refills: 11 | Status: SHIPPED | OUTPATIENT
Start: 2024-09-05 | End: 2025-09-05

## 2024-09-05 NOTE — TELEPHONE ENCOUNTER
No care due was identified.  Health [facility] Embedded Care Due Messages. Reference number: [id number].   9/05/2024 11:18:35 AM CDT

## 2024-09-05 NOTE — TELEPHONE ENCOUNTER
Refill Decision Note   William Dovebs  is requesting a refill authorization.  Brief Assessment and Rationale for Refill:  Approve     Medication Therapy Plan:         Comments:     Note composed:3:03 PM 09/05/2024

## 2024-09-19 ENCOUNTER — HOSPITAL ENCOUNTER (OUTPATIENT)
Dept: RADIOLOGY | Facility: HOSPITAL | Age: 74
Discharge: HOME | End: 2024-09-19
Attending: INTERNAL MEDICINE
Payer: MEDICARE

## 2024-09-19 DIAGNOSIS — Z87.891 HISTORY OF TOBACCO USE: ICD-10-CM

## 2024-09-19 DIAGNOSIS — N28.1 RENAL CYST, LEFT: ICD-10-CM

## 2024-09-19 PROCEDURE — 76770 US EXAM ABDO BACK WALL COMP: CPT | Mod: TC,HCNC

## 2024-09-19 PROCEDURE — 76770 US EXAM ABDO BACK WALL COMP: CPT | Mod: 26,HCNC,, | Performed by: STUDENT IN AN ORGANIZED HEALTH CARE EDUCATION/TRAINING PROGRAM

## 2024-10-07 ENCOUNTER — PATIENT OUTREACH (OUTPATIENT)
Dept: ADMINISTRATIVE | Facility: HOSPITAL | Age: 74
End: 2024-10-07
Payer: MEDICARE

## 2024-10-07 NOTE — PROGRESS NOTES
Health Maintenance Due   Topic Date Due    Annual UACr  Never done    Influenza Vaccine (1) 09/01/2024    COVID-19 Vaccine (7 - 2024-25 season) 09/01/2024       Chart reviewed and updated. Reconciled immunizations.    Marlin Jesus LPN   Clinical Care Coordinator  Primary Care and Wellness

## 2024-10-10 ENCOUNTER — IMMUNIZATION (OUTPATIENT)
Dept: INTERNAL MEDICINE | Facility: CLINIC | Age: 74
End: 2024-10-10
Payer: MEDICARE

## 2024-10-10 DIAGNOSIS — Z23 NEED FOR VACCINATION: Primary | ICD-10-CM

## 2024-10-10 PROCEDURE — 90653 IIV ADJUVANT VACCINE IM: CPT | Mod: HCNC,S$GLB,, | Performed by: INTERNAL MEDICINE

## 2024-10-10 PROCEDURE — G0008 ADMIN INFLUENZA VIRUS VAC: HCPCS | Mod: HCNC,S$GLB,, | Performed by: INTERNAL MEDICINE

## 2024-10-14 ENCOUNTER — PATIENT MESSAGE (OUTPATIENT)
Dept: ADMINISTRATIVE | Facility: OTHER | Age: 74
End: 2024-10-14
Payer: MEDICARE

## 2024-10-21 ENCOUNTER — IMMUNIZATION (OUTPATIENT)
Dept: INTERNAL MEDICINE | Facility: CLINIC | Age: 74
End: 2024-10-21
Payer: MEDICARE

## 2024-10-21 ENCOUNTER — OFFICE VISIT (OUTPATIENT)
Dept: INTERNAL MEDICINE | Facility: CLINIC | Age: 74
End: 2024-10-21
Payer: MEDICARE

## 2024-10-21 VITALS
DIASTOLIC BLOOD PRESSURE: 64 MMHG | SYSTOLIC BLOOD PRESSURE: 108 MMHG | RESPIRATION RATE: 18 BRPM | BODY MASS INDEX: 24.37 KG/M2 | HEIGHT: 69 IN | HEART RATE: 99 BPM | TEMPERATURE: 98 F | WEIGHT: 164.56 LBS | OXYGEN SATURATION: 95 %

## 2024-10-21 DIAGNOSIS — I10 ESSENTIAL HYPERTENSION: ICD-10-CM

## 2024-10-21 DIAGNOSIS — K21.9 GASTROESOPHAGEAL REFLUX DISEASE, UNSPECIFIED WHETHER ESOPHAGITIS PRESENT: Primary | ICD-10-CM

## 2024-10-21 DIAGNOSIS — J44.9 CHRONIC OBSTRUCTIVE PULMONARY DISEASE, UNSPECIFIED COPD TYPE: ICD-10-CM

## 2024-10-21 DIAGNOSIS — J43.2 CENTRILOBULAR EMPHYSEMA: ICD-10-CM

## 2024-10-21 DIAGNOSIS — Z23 NEED FOR VACCINATION: Primary | ICD-10-CM

## 2024-10-21 PROCEDURE — 99999 PR PBB SHADOW E&M-EST. PATIENT-LVL IV: CPT | Mod: PBBFAC,HCNC,, | Performed by: PHYSICIAN ASSISTANT

## 2024-10-21 PROCEDURE — 90480 ADMN SARSCOV2 VAC 1/ONLY CMP: CPT | Mod: HCNC,S$GLB,, | Performed by: INTERNAL MEDICINE

## 2024-10-21 PROCEDURE — 91320 SARSCV2 VAC 30MCG TRS-SUC IM: CPT | Mod: HCNC,S$GLB,, | Performed by: INTERNAL MEDICINE

## 2024-10-21 RX ORDER — FELODIPINE 5 MG/1
5 TABLET, EXTENDED RELEASE ORAL DAILY
Qty: 90 TABLET | Refills: 3 | Status: SHIPPED | OUTPATIENT
Start: 2024-10-21

## 2024-10-21 RX ORDER — FAMOTIDINE 20 MG/1
20 TABLET, FILM COATED ORAL 2 TIMES DAILY
Qty: 180 TABLET | Refills: 3 | Status: SHIPPED | OUTPATIENT
Start: 2024-10-21

## 2024-10-21 NOTE — PROGRESS NOTES
Subjective:       Patient ID: William Bueno is a 73 y.o. male.        Chief Complaint: Follow-up    William Bueno is an established patient of Lexie Tesfaye MD here today for follow up visit.    Feeling great!    He has been working hard to lose weight  Eating 2 times daily instead of 3 times daily, smaller portions  More active as well  BMI down to 24    RODGERS has resolved with breo, now able to complete all activities with no issues    HTN -   Hctz 12.5 mg daily  Plendil 5 mg daily  In digital hypertension with good control     COPD/emphysema, history of tobacco abuse -   Last saw Dr. Nur in 2021 with PFTs showing mild obstruction, CT with emphysematous changes  Breo restarted 8/6/24 and feeling much better     Prostate cancer -   Dr. Chavarria manages  Active surveillance  Prostate bx 1/2024     GERD -   Pepcid 20 mg daily-BID, would like rx as expensive OTC     Anemia     Lipids -   Lipitor 40 mg daily     CKD 3a -   Creatinine 1.5-1.6  GFR 45-49                Review of Systems   Constitutional:  Negative for appetite change, chills, fatigue and fever.   HENT:  Negative for congestion and sore throat.    Eyes:  Negative for visual disturbance.   Respiratory:  Negative for cough, chest tightness and shortness of breath.    Cardiovascular:  Negative for chest pain, palpitations and leg swelling.   Gastrointestinal:  Negative for abdominal pain, blood in stool, constipation, diarrhea, nausea and vomiting.   Genitourinary:  Negative for dysuria, frequency, hematuria and urgency.   Musculoskeletal:  Negative for arthralgias and back pain.   Skin:  Negative for rash.   Neurological:  Negative for dizziness, syncope, weakness and headaches.   Psychiatric/Behavioral:  Negative for dysphoric mood and sleep disturbance. The patient is not nervous/anxious.        Objective:      Physical Exam  Vitals and nursing note reviewed.   Constitutional:       Appearance: He is well-developed.   HENT:      Head: Normocephalic.       Right Ear: External ear normal.      Left Ear: External ear normal.   Eyes:      Pupils: Pupils are equal, round, and reactive to light.   Cardiovascular:      Rate and Rhythm: Normal rate and regular rhythm.      Heart sounds: Normal heart sounds. No murmur heard.     No friction rub. No gallop.   Pulmonary:      Effort: Pulmonary effort is normal. No respiratory distress.      Breath sounds: Normal breath sounds.   Abdominal:      Palpations: Abdomen is soft.      Tenderness: There is no abdominal tenderness.   Musculoskeletal:         General: No swelling.   Skin:     General: Skin is warm and dry.   Neurological:      General: No focal deficit present.      Mental Status: He is alert.   Psychiatric:         Mood and Affect: Mood normal.         Assessment:       1. Gastroesophageal reflux disease, unspecified whether esophagitis present    2. Chronic obstructive pulmonary disease, unspecified COPD type    3. Centrilobular emphysema    4. Essential hypertension        Plan:       William was seen today for follow-up.    Diagnoses and all orders for this visit:    Gastroesophageal reflux disease, unspecified whether esophagitis present  -     REFILL: famotidine (PEPCID) 20 MG tablet; Take 1 tablet (20 mg total) by mouth 2 (two) times daily.    Chronic obstructive pulmonary disease, unspecified COPD type  Centrilobular emphysema       -     Feeling much better with addition of breo, to continue    Essential hypertension - stable and controlled, continue current regimen  -     REFILL: felodipine (PLENDIL) 5 MG 24 hr tablet; Take 1 tablet (5 mg total) by mouth once daily.  BP Readings from Last 5 Encounters:   10/21/24 108/64   08/20/24 104/68   08/06/24 104/72   07/16/24 116/70   02/05/24 138/82      F/u for annual with Dr. Brien Smith booster today    Pt has been given instructions populated from patient instructions database and has verbalized understanding of the after visit summary and information contained  "wherein.    Follow up with a primary care provider. May go to ER for acute shortness of breath, lightheadedness, fever, or any other emergent complaints or changes in condition.    "This note will be shared with the patient"    Future Appointments   Date Time Provider Department Center   1/13/2025 10:00 AM LAB, HEMON CANCER [facility] LAB TYREE Graves   1/15/2025  1:00 PM PROCEDURE, UROLOGY ROOM 2 [facility] UROPRO Paolo Graves                 "

## 2024-10-30 RX ORDER — TAMSULOSIN HYDROCHLORIDE 0.4 MG/1
0.4 CAPSULE ORAL
Qty: 90 CAPSULE | Refills: 3 | Status: SHIPPED | OUTPATIENT
Start: 2024-10-30 | End: 2025-10-25

## 2025-01-04 ENCOUNTER — PATIENT MESSAGE (OUTPATIENT)
Dept: ADMINISTRATIVE | Facility: OTHER | Age: 75
End: 2025-01-04
Payer: MEDICARE

## 2025-01-09 ENCOUNTER — TELEPHONE (OUTPATIENT)
Dept: UROLOGY | Facility: CLINIC | Age: 75
End: 2025-01-09
Payer: MEDICARE

## 2025-01-09 NOTE — TELEPHONE ENCOUNTER
"Patient called requesting to cancel his TRUS scheduled for 1/15/2025.  Patient report he "does not feel comfortable doing it now" and states he will probably get it done in the summertime.  States he will call the office when he's ready to reschedule.  "

## 2025-01-14 DIAGNOSIS — Z00.00 ENCOUNTER FOR MEDICARE ANNUAL WELLNESS EXAM: ICD-10-CM

## 2025-02-06 ENCOUNTER — OFFICE VISIT (OUTPATIENT)
Dept: INTERNAL MEDICINE | Facility: CLINIC | Age: 75
End: 2025-02-06
Payer: MEDICARE

## 2025-02-06 VITALS
BODY MASS INDEX: 23.67 KG/M2 | OXYGEN SATURATION: 99 % | WEIGHT: 159.81 LBS | HEIGHT: 69 IN | HEART RATE: 85 BPM | DIASTOLIC BLOOD PRESSURE: 80 MMHG | SYSTOLIC BLOOD PRESSURE: 120 MMHG

## 2025-02-06 DIAGNOSIS — I70.0 AORTIC ATHEROSCLEROSIS: ICD-10-CM

## 2025-02-06 DIAGNOSIS — N18.31 STAGE 3A CHRONIC KIDNEY DISEASE: ICD-10-CM

## 2025-02-06 DIAGNOSIS — C61 PROSTATE CANCER: ICD-10-CM

## 2025-02-06 DIAGNOSIS — Z00.00 ENCOUNTER FOR MEDICARE ANNUAL WELLNESS EXAM: Primary | ICD-10-CM

## 2025-02-06 DIAGNOSIS — D64.9 ANEMIA, UNSPECIFIED TYPE: ICD-10-CM

## 2025-02-06 DIAGNOSIS — I10 ESSENTIAL HYPERTENSION: ICD-10-CM

## 2025-02-06 DIAGNOSIS — J43.2 CENTRILOBULAR EMPHYSEMA: ICD-10-CM

## 2025-02-06 PROCEDURE — G9919 SCRN ND POS ND PROV OF REC: HCPCS | Mod: HCNC,CPTII,S$GLB, | Performed by: NURSE PRACTITIONER

## 2025-02-06 PROCEDURE — 3288F FALL RISK ASSESSMENT DOCD: CPT | Mod: HCNC,CPTII,S$GLB, | Performed by: NURSE PRACTITIONER

## 2025-02-06 PROCEDURE — G0439 PPPS, SUBSEQ VISIT: HCPCS | Mod: HCNC,S$GLB,, | Performed by: NURSE PRACTITIONER

## 2025-02-06 PROCEDURE — 1124F ACP DISCUSS-NO DSCNMKR DOCD: CPT | Mod: HCNC,CPTII,S$GLB, | Performed by: NURSE PRACTITIONER

## 2025-02-06 PROCEDURE — 3079F DIAST BP 80-89 MM HG: CPT | Mod: HCNC,CPTII,S$GLB, | Performed by: NURSE PRACTITIONER

## 2025-02-06 PROCEDURE — 1126F AMNT PAIN NOTED NONE PRSNT: CPT | Mod: HCNC,CPTII,S$GLB, | Performed by: NURSE PRACTITIONER

## 2025-02-06 PROCEDURE — 99999 PR PBB SHADOW E&M-EST. PATIENT-LVL V: CPT | Mod: PBBFAC,HCNC,, | Performed by: NURSE PRACTITIONER

## 2025-02-06 PROCEDURE — 1160F RVW MEDS BY RX/DR IN RCRD: CPT | Mod: HCNC,CPTII,S$GLB, | Performed by: NURSE PRACTITIONER

## 2025-02-06 PROCEDURE — 1170F FXNL STATUS ASSESSED: CPT | Mod: HCNC,CPTII,S$GLB, | Performed by: NURSE PRACTITIONER

## 2025-02-06 PROCEDURE — 3074F SYST BP LT 130 MM HG: CPT | Mod: HCNC,CPTII,S$GLB, | Performed by: NURSE PRACTITIONER

## 2025-02-06 PROCEDURE — 1159F MED LIST DOCD IN RCRD: CPT | Mod: HCNC,CPTII,S$GLB, | Performed by: NURSE PRACTITIONER

## 2025-02-06 PROCEDURE — 1101F PT FALLS ASSESS-DOCD LE1/YR: CPT | Mod: HCNC,CPTII,S$GLB, | Performed by: NURSE PRACTITIONER

## 2025-02-06 NOTE — PROGRESS NOTES
"William Bueno presented for a follow-up Medicare AWV today. The following components were reviewed and updated:    Medical history  Family History  Social history  Allergies and Current Medications  Health Risk Assessment  Health Maintenance  Care Team    **See Completed Assessments for Annual Wellness visit with in the encounter summary    The following assessments were completed:  Depression Screening  Cognitive function Screening    Timed Get Up Test  Whisper Test      Opioid documentation:      Patient does not have a current opioid prescription.          Vitals:    02/06/25 1233   BP: 120/80   Pulse: 85   SpO2: 99%   Weight: 72.5 kg (159 lb 13.3 oz)   Height: 5' 9" (1.753 m)     Body mass index is 23.6 kg/m².       Physical Exam  Constitutional:       Appearance: Normal appearance.   Cardiovascular:      Rate and Rhythm: Normal rate and regular rhythm.   Pulmonary:      Effort: Pulmonary effort is normal.      Breath sounds: Normal breath sounds.   Musculoskeletal:         General: Normal range of motion.   Skin:     General: Skin is warm and dry.   Neurological:      General: No focal deficit present.      Mental Status: He is alert.   Psychiatric:         Mood and Affect: Mood normal.           Diagnoses and health risks identified today and associated recommendations/orders:    1. Encounter for Medicare annual wellness exam  Here for Health Risk Assessment/Annual Wellness Visit.  Health maintenance reviewed and updated. Follow up in one year.   - Ambulatory Referral/Consult to Enhanced Annual Wellness Visit (eAWV)    2. Essential hypertension  Chronic, at goal with felodipine, HCTZ. Followed by PCP.    3. Aortic atherosclerosis  Chronic, stable on current medication. Followed by PCP.     4. Centrilobular emphysema  Chronic, stable on current medication. No C/O dyspnea/wheezing/cough. Followed by PCP.     5. Stage 3a chronic kidney disease  Chronic, stable on current medications. Followed by PCP.     6. " Anemia, unspecified type  Chronic, stable. Followed by PCP.    7. Prostate cancer  Chronic, active surveillance. Most recent PSA 8, previous 12.7. Followed by Urology, PCP.      Provided William with a 5-10 year written screening schedule and personal prevention plan. Recommendations were developed using the USPSTF age appropriate recommendations. Education, counseling, and referrals were provided as needed.  After Visit Summary printed and given to patient which includes a list of additional screenings\tests needed.    Follow up in about 6 months (around 8/20/2025).with PCP      Katherine Betts NP

## 2025-02-06 NOTE — PATIENT INSTRUCTIONS
Counseling and Referral of Other Preventative  (Italic type indicates deductible and co-insurance are waived)    Patient Name: William Bueno  Today's Date: 2/6/2025    Health Maintenance       Date Due Completion Date    Annual UACr Never done ---    COVID-19 Vaccine (8 - 2024-25 season) 12/16/2024 10/21/2024    Colorectal Cancer Screening 06/22/2025 6/22/2020    LDCT Lung Screen 08/20/2025 8/20/2024    High Dose Statin 10/21/2025 10/21/2024    Lipid Panel 08/06/2029 8/6/2024    TETANUS VACCINE 10/27/2031 10/27/2021        No orders of the defined types were placed in this encounter.      The following information is provided to all patients.  This information is to help you find resources for any of the problems found today that may be affecting your health:                  Living healthy guide: www.[facility].[place]      Understanding Diabetes: www.diabetes.org      Eating healthy: www.cdc.gov/healthyweight      [facility] home safety checklist: www.cdc.gov/steadi/patient.html      Agency on Aging: www.goea.[place].[facility]      Alcoholics anonymous (AA): www.aa.org      Physical Activity: www.[place]yl.nih.gov/bq2hciv      Tobacco use: www.quitwithusla.org         
no

## 2025-04-21 ENCOUNTER — PATIENT MESSAGE (OUTPATIENT)
Dept: ADMINISTRATIVE | Facility: HOSPITAL | Age: 75
End: 2025-04-21
Payer: MEDICARE

## 2025-04-22 ENCOUNTER — TELEPHONE (OUTPATIENT)
Dept: ENDOSCOPY | Facility: HOSPITAL | Age: 75
End: 2025-04-22

## 2025-04-22 ENCOUNTER — CLINICAL SUPPORT (OUTPATIENT)
Dept: ENDOSCOPY | Facility: HOSPITAL | Age: 75
End: 2025-04-22
Attending: INTERNAL MEDICINE
Payer: MEDICARE

## 2025-04-22 ENCOUNTER — PATIENT OUTREACH (OUTPATIENT)
Dept: ADMINISTRATIVE | Facility: HOSPITAL | Age: 75
End: 2025-04-22
Payer: MEDICARE

## 2025-04-22 VITALS — BODY MASS INDEX: 23.18 KG/M2 | WEIGHT: 157 LBS

## 2025-04-22 DIAGNOSIS — Z12.12 ENCOUNTER FOR COLORECTAL CANCER SCREENING: ICD-10-CM

## 2025-04-22 DIAGNOSIS — Z12.12 ENCOUNTER FOR COLORECTAL CANCER SCREENING: Primary | ICD-10-CM

## 2025-04-22 DIAGNOSIS — Z12.11 ENCOUNTER FOR COLORECTAL CANCER SCREENING: ICD-10-CM

## 2025-04-22 DIAGNOSIS — Z12.11 ENCOUNTER FOR COLORECTAL CANCER SCREENING: Primary | ICD-10-CM

## 2025-04-22 NOTE — TELEPHONE ENCOUNTER
Referral for procedure from  Workqueue referral (see Appts tab)      Spoke to patient to schedule procedure(s) Colonoscopy       Physician to perform procedure(s) AC Martínez  Date of Procedure (s) 06/20/25  Arrival Time 07:00 AM  Time of Procedure(s) 08:00 AM   Location of Procedure(s) [place] 4th Floor  Type of Rx Prep sent to patient: Miralax  Instructions provided to patient via [facility]    Patient was informed on the following information and verbalized understanding. Screening questionnaire reviewed with patient and complete. If procedure requires anesthesia, a responsible adult needs to be present to accompany the patient home, patient cannot drive after receiving anesthesia. Appointment details are tentative, especially check-in time. Patient will receive a prep-op call 7 days prior to confirm check-in time for procedure. If applicable the patient should contact their pharmacy to verify Rx for procedure prep is ready for pick-up. Patient was advised to call the scheduling department at [phone] if pharmacy states no Rx is available. Patient was advised to call the endoscopy scheduling department if any questions or concerns arise.      [facility] Endoscopy Scheduling Department

## 2025-04-22 NOTE — PROGRESS NOTES
Chart reviewed and updated. Reconciled immunizations, health maintenance and care everywhere.  Placed refendo referral.      Claudia Yoder [facility]  Panel Care Coordinator  [facility]  [phone]  For Lexie Tesfaye MD

## 2025-06-04 DIAGNOSIS — I70.0 AORTIC ATHEROSCLEROSIS: ICD-10-CM

## 2025-06-04 RX ORDER — ATORVASTATIN CALCIUM 40 MG/1
40 TABLET, FILM COATED ORAL DAILY
Qty: 90 TABLET | Refills: 0 | Status: SHIPPED | OUTPATIENT
Start: 2025-06-04

## 2025-06-09 ENCOUNTER — TELEPHONE (OUTPATIENT)
Dept: ENDOSCOPY | Facility: HOSPITAL | Age: 75
End: 2025-06-09
Payer: MEDICARE

## 2025-06-09 DIAGNOSIS — Z12.11 SPECIAL SCREENING FOR MALIGNANT NEOPLASMS, COLON: Primary | ICD-10-CM

## 2025-06-13 ENCOUNTER — ANESTHESIA EVENT (OUTPATIENT)
Dept: ENDOSCOPY | Facility: HOSPITAL | Age: 75
End: 2025-06-13
Payer: MEDICARE

## 2025-06-13 ENCOUNTER — HOSPITAL ENCOUNTER (OUTPATIENT)
Facility: HOSPITAL | Age: 75
Discharge: HOME | End: 2025-06-13
Attending: INTERNAL MEDICINE | Admitting: INTERNAL MEDICINE
Payer: MEDICARE

## 2025-06-13 ENCOUNTER — ANESTHESIA (OUTPATIENT)
Dept: ENDOSCOPY | Facility: HOSPITAL | Age: 75
End: 2025-06-13
Payer: MEDICARE

## 2025-06-13 VITALS
HEIGHT: 69 IN | HEART RATE: 55 BPM | DIASTOLIC BLOOD PRESSURE: 68 MMHG | RESPIRATION RATE: 16 BRPM | OXYGEN SATURATION: 99 % | TEMPERATURE: 98 F | BODY MASS INDEX: 23.54 KG/M2 | SYSTOLIC BLOOD PRESSURE: 113 MMHG | WEIGHT: 158.94 LBS

## 2025-06-13 DIAGNOSIS — K63.5 COLON POLYPS: ICD-10-CM

## 2025-06-13 DIAGNOSIS — Z12.11 ENCOUNTER FOR COLORECTAL CANCER SCREENING: ICD-10-CM

## 2025-06-13 DIAGNOSIS — Z12.12 ENCOUNTER FOR COLORECTAL CANCER SCREENING: ICD-10-CM

## 2025-06-13 DIAGNOSIS — K63.5 DYSPLASTIC COLON POLYP: Primary | ICD-10-CM

## 2025-06-13 PROCEDURE — 37000008 HC ANESTHESIA 1ST 15 MINUTES: Mod: HCNC | Performed by: INTERNAL MEDICINE

## 2025-06-13 PROCEDURE — 27201012 HC FORCEPS, HOT/COLD, DISP: Mod: HCNC | Performed by: INTERNAL MEDICINE

## 2025-06-13 PROCEDURE — 45380 COLONOSCOPY AND BIOPSY: CPT | Mod: PT,HCNC | Performed by: INTERNAL MEDICINE

## 2025-06-13 PROCEDURE — 88305 TISSUE EXAM BY PATHOLOGIST: CPT | Mod: TC,91,HCNC | Performed by: INTERNAL MEDICINE

## 2025-06-13 PROCEDURE — E9220 PRA ENDO ANESTHESIA: HCPCS | Mod: HCNC,PT,, | Performed by: NURSE ANESTHETIST, CERTIFIED REGISTERED

## 2025-06-13 PROCEDURE — 45380 COLONOSCOPY AND BIOPSY: CPT | Mod: PT,HCNC,, | Performed by: INTERNAL MEDICINE

## 2025-06-13 PROCEDURE — 37000009 HC ANESTHESIA EA ADD 15 MINS: Mod: HCNC | Performed by: INTERNAL MEDICINE

## 2025-06-13 PROCEDURE — 63600175 PHARM REV CODE 636 W HCPCS: Mod: HCNC | Performed by: NURSE ANESTHETIST, CERTIFIED REGISTERED

## 2025-06-13 PROCEDURE — 25000003 PHARM REV CODE 250: Mod: HCNC | Performed by: INTERNAL MEDICINE

## 2025-06-13 RX ORDER — SODIUM CHLORIDE 9 MG/ML
INJECTION, SOLUTION INTRAVENOUS CONTINUOUS
Status: DISCONTINUED | OUTPATIENT
Start: 2025-06-13 | End: 2025-06-13 | Stop reason: HOSPADM

## 2025-06-13 RX ORDER — LIDOCAINE HYDROCHLORIDE 20 MG/ML
INJECTION, SOLUTION EPIDURAL; INFILTRATION; INTRACAUDAL; PERINEURAL
Status: DISCONTINUED | OUTPATIENT
Start: 2025-06-13 | End: 2025-06-13

## 2025-06-13 RX ORDER — PROPOFOL 10 MG/ML
VIAL (ML) INTRAVENOUS
Status: DISCONTINUED | OUTPATIENT
Start: 2025-06-13 | End: 2025-06-13

## 2025-06-13 RX ADMIN — PROPOFOL 100 MG: 10 INJECTION, EMULSION INTRAVENOUS at 12:06

## 2025-06-13 RX ADMIN — LIDOCAINE HYDROCHLORIDE 50 MG: 20 INJECTION, SOLUTION EPIDURAL; INFILTRATION; INTRACAUDAL; PERINEURAL at 12:06

## 2025-06-13 RX ADMIN — PROPOFOL 150 MCG/KG/MIN: 10 INJECTION, EMULSION INTRAVENOUS at 01:06

## 2025-06-13 RX ADMIN — SODIUM CHLORIDE: 0.9 INJECTION, SOLUTION INTRAVENOUS at 12:06

## 2025-06-13 NOTE — H&P
Short Stay Endoscopy History and Physical    PCP - Lexie Tesfaye MD    Procedure - Colonoscopy  ASA - per anesthesia  Mallampati - per anesthesia  Plan of anesthesia - MAC    HPI:  This is a 74 y.o. male here for evaluation of : personal history of colon polyps    ROS:  Constitutional: No fevers, chills  CV: No chest pain  Pulm: No cough  Ophtho: No vision changes  GI: see HPI  Derm: No rash    Medical History:  has a past medical history of Depression, Emphysema of lung, and Hypertension.    Surgical History:  has a past surgical history that includes Tonsillectomy; Colonoscopy; Hernia repair (Right, 2017); and Colonoscopy (N/A, 6/22/2020).    Family History: family history includes Cancer in his brother; Diabetes in his mother; Heart disease in his father; Iron deficiency in his daughter; Kidney disease in his mother; No Known Problems in his brother and son.. Otherwise no colon cancer, inflammatory bowel disease, or GI malignancies.    Social History:  reports that he quit smoking about 3 years ago. His smoking use included cigarettes. He started smoking about 58 years ago. He has a 27.5 pack-year smoking history. He has never used smokeless tobacco. He reports that he does not currently use alcohol after a past usage of about 1.0 standard drink of alcohol per week. He reports that he does not currently use drugs after having used the following drugs: Marijuana. Frequency: 1.00 time per week.    Review of patient's allergies indicates:  No Known Allergies    Medications:   Prescriptions Prior to Admission[1]      Vital Signs:   Vitals:    06/13/25 1210   BP: 120/76   Pulse: 86   Resp: 18   Temp: 97.5 °F (36.4 °C)       General Appearance: Well appearing in no acute distress      Labs:  Lab Results   Component Value Date    WBC 5.82 08/06/2024    HGB 12.9 (L) 08/06/2024    HCT 40.0 08/06/2024     08/06/2024    CHOL 126 08/06/2024    TRIG 76 08/06/2024    HDL 49 08/06/2024    ALT 18 06/03/2024    AST  19 06/03/2024     06/03/2024    K 4.9 06/03/2024     06/03/2024    CREATININE 1.6 (H) 06/03/2024    BUN 15 06/03/2024    CO2 24 06/03/2024    TSH 0.572 04/21/2021    PSA 7.4 (H) 04/11/2022    GLUF 93 07/15/2015    HGBA1C 6.0 07/15/2015       I have explained the risks and benefits of endoscopy procedures to the patient/their POA including but not limited to bleeding, perforation, infection, and death.  The patient/their POA was asked if they understand and allowed to ask any further questions to their satisfaction.    Agusto Ramon MD         [1]   Medications Prior to Admission   Medication Sig Dispense Refill Last Dose/Taking    atorvastatin (LIPITOR) 40 MG tablet Take 1 tablet (40 mg total) by mouth once daily. 90 tablet 0 6/12/2025    famotidine (PEPCID) 20 MG tablet Take 1 tablet (20 mg total) by mouth 2 (two) times daily. 180 tablet 3 6/12/2025    felodipine (PLENDIL) 5 MG 24 hr tablet Take 1 tablet (5 mg total) by mouth once daily. 90 tablet 3 6/12/2025    hydroCHLOROthiazide 12.5 MG Tab Take 1 tablet (12.5 mg total) by mouth once daily. 90 tablet 0 6/12/2025    fluticasone furoate-vilanteroL (BREO ELLIPTA) 200-25 mcg/dose DsDv diskus inhaler Inhale 1 puff into the lungs once daily. Controller 60 each 11     polyethylene glycol (COLYTE) 240-22.72-6.72 -5.84 gram SolR Take as directed by provider office 4000 mL 0     sildenafiL (VIAGRA) 100 MG tablet Take 1 tablet (100 mg total) by mouth daily as needed for Erectile Dysfunction. 10 tablet 11     tamsulosin (FLOMAX) 0.4 mg Cap TAKE 1 CAPSULE (0.4 MG TOTAL) BY MOUTH AFTER DINNER 90 capsule 3 6/11/2025

## 2025-06-13 NOTE — PLAN OF CARE
Fall risk band and ID band verified and applied. POC reviewed with patient. Patient has no questions at this time.

## 2025-06-13 NOTE — ANESTHESIA PREPROCEDURE EVALUATION
06/13/2025  William Bueno is a 74 y.o., male.      Pre-op Assessment    I have reviewed the Patient Summary Reports.    I have reviewed the NPO Status.   I have reviewed the Medications.     Review of Systems  Anesthesia Hx:  No problems with previous Anesthesia                Cardiovascular:     Hypertension                                          Pulmonary:   COPD                     Renal/:  Chronic Renal Disease, CKD                Psych:  Psychiatric History                  Physical Exam  General: Well nourished    Airway:  Mallampati: I   Mouth Opening: Normal  TM Distance: Normal  Neck ROM: Normal ROM    Dental:  Dentures        Anesthesia Plan  Type of Anesthesia, risks & benefits discussed:    Anesthesia Type: Gen Natural Airway  Intra-op Monitoring Plan: Standard ASA Monitors  Induction:  IV  Informed Consent: Informed consent signed with the Patient and all parties understand the risks and agree with anesthesia plan.  All questions answered.   ASA Score: 2  Day of Surgery Review of History & Physical: H&P Update referred to the surgeon/provider.    Ready For Surgery From Anesthesia Perspective.     .

## 2025-06-13 NOTE — PROVATION PATIENT INSTRUCTIONS
Discharge Summary/Instructions after an Endoscopic Procedure  Patient Name: William Bueno  Patient MRN: [id number]  Patient YOB: 1950 Friday, June 13, 2025  Leonard Mccloud MD  Dear patient,  As a result of recent federal legislation (The Federal Cures Act), you may   receive lab or pathology results from your procedure in your [facility]   account before your physician is able to contact you. Your physician or   their representative will relay the results to you with their   recommendations at their soonest availability.  Thank you,  RESTRICTIONS:  During your procedure today, you received medications for sedation.  These   medications may affect your judgment, balance and coordination.  Therefore,   for 24 hours, you have the following restrictions:   - DO NOT drive a car, operate machinery, make legal/financial decisions,   sign important papers or drink alcohol.    ACTIVITY:  Today: no heavy lifting, straining or running due to procedural   sedation/anesthesia.  The following day: return to full activity including work.  DIET:  Eat and drink normally unless instructed otherwise.     TREATMENT FOR COMMON SIDE EFFECTS:  - Mild abdominal pain, nausea, belching, bloating or excessive gas:  rest,   eat lightly and use a heating pad.  - Sore Throat: treat with throat lozenges and/or gargle with warm salt   water.  - Because air was used during the procedure, expelling large amounts of air   from your rectum or belching is normal.  - If a bowel prep was taken, you may not have a bowel movement for 1-3 days.    This is normal.  SYMPTOMS TO WATCH FOR AND REPORT TO YOUR PHYSICIAN:  1. Abdominal pain or bloating, other than gas cramps.  2. Chest pain.  3. Back pain.  4. Signs of infection such as: chills or fever occurring within 24 hours   after the procedure.  5. Rectal bleeding, which would show as bright red, maroon, or black stools.   (A tablespoon of blood from the rectum is not serious, especially if    hemorrhoids are present.)  6. Vomiting.  7. Weakness or dizziness.  GO DIRECTLY TO THE NEAREST EMERGENCY ROOM IF YOU HAVE ANY OF THE FOLLOWING:      Difficulty breathing              Chills and/or fever over 101 F   Persistent vomiting and/or vomiting blood   Severe abdominal pain   Severe chest pain   Black, tarry stools   Bleeding- more than one tablespoon   Any other symptom or condition that you feel may need urgent attention  Your doctor recommends these additional instructions:  If any biopsies were taken, your doctors clinic will contact you in 1 to 2   weeks with any results.  - Discharge patient to home (ambulatory).   - Patient has a contact number available for emergencies.  The signs and   symptoms of potential delayed complications were discussed with the   patient.  Return to normal activities tomorrow.  Written discharge   instructions were provided to the patient.   - Resume previous diet.   - Continue present medications.   - Return to primary care physician as previously scheduled.   - Repeat colonoscopy in 3 years for surveillance.  For questions, problems or results please call your physician - Leonard Mccloud MD at Work:  [phone].  [facility] [place], EMERGENCY ROOM PHONE NUMBER: [phone]  IF A COMPLICATION OR EMERGENCY SITUATION ARISES AND YOU ARE UNABLE TO REACH   YOUR PHYSICIAN - GO DIRECTLY TO THE EMERGENCY ROOM.  Leonard Mccloud MD  6/13/2025 1:54:23 PM  This report has been verified and signed electronically.  Dear patient,  As a result of recent federal legislation (The Federal Cures Act), you may   receive lab or pathology results from your procedure in your [facility]   account before your physician is able to contact you. Your physician or   their representative will relay the results to you with their   recommendations at their soonest availability.  Thank you,  PROVATION

## 2025-06-13 NOTE — TRANSFER OF CARE
"Anesthesia Transfer of Care Note    Patient: William Bueno    Procedure(s) Performed: Procedure(s) (LRB):  COLONOSCOPY, SCREENING, HIGH RISK PATIENT (N/A)    Patient location: GI    Anesthesia Type: general    Transport from OR: Transported from OR on room air with adequate spontaneous ventilation    Post pain: adequate analgesia    Post assessment: no apparent anesthetic complications    Post vital signs: stable    Level of consciousness: awake    Nausea/Vomiting: no nausea/vomiting    Complications: none    Transfer of care protocol was followed      Last vitals: Visit Vitals  /76 (BP Location: Right arm, Patient Position: Lying)   Pulse 86   Temp 36.4 °C (97.5 °F) (Temporal)   Resp 18   Ht 5' 9" (1.753 m)   Wt 72.1 kg (158 lb 15.2 oz)   SpO2 97%   BMI 23.47 kg/m²     "

## 2025-06-16 ENCOUNTER — RESULTS FOLLOW-UP (OUTPATIENT)
Dept: GASTROENTEROLOGY | Facility: CLINIC | Age: 75
End: 2025-06-16
Payer: MEDICARE

## 2025-06-16 LAB
ESTROGEN SERPL-MCNC: NORMAL PG/ML
INSULIN SERPL-ACNC: NORMAL U[IU]/ML
LAB AP CLINICAL INFORMATION: NORMAL
LAB AP GROSS DESCRIPTION: NORMAL
LAB AP PERFORMING LOCATION(S): NORMAL
LAB AP REPORT FOOTNOTES: NORMAL

## 2025-06-16 NOTE — ANESTHESIA POSTPROCEDURE EVALUATION
Anesthesia Post Evaluation    Patient: William Bueno    Procedure(s) Performed: Procedure(s) (LRB):  COLONOSCOPY, SCREENING, HIGH RISK PATIENT (N/A)    Final Anesthesia Type: general      Patient location during evaluation: PACU  Patient participation: Yes- Able to Participate  Level of consciousness: awake and alert and oriented  Pain management: adequate  Airway patency: patent    PONV status at discharge: No PONV  Anesthetic complications: no      Cardiovascular status: blood pressure returned to baseline and hemodynamically stable  Respiratory status: unassisted  Hydration status: euvolemic  Follow-up not needed.              Vitals Value Taken Time   /68 06/13/25 14:27   Temp 36.7 °C (98 °F) 06/13/25 13:36   Pulse 55 06/13/25 14:27   Resp 16 06/13/25 14:27   SpO2 99 % 06/13/25 14:27         Event Time   Out of Recovery 14:02:28         Pain/Scott Score: No data recorded

## 2025-07-02 ENCOUNTER — PATIENT MESSAGE (OUTPATIENT)
Dept: ADMINISTRATIVE | Facility: OTHER | Age: 75
End: 2025-07-02
Payer: MEDICARE

## 2025-07-23 DIAGNOSIS — K21.9 GASTROESOPHAGEAL REFLUX DISEASE, UNSPECIFIED WHETHER ESOPHAGITIS PRESENT: ICD-10-CM

## 2025-07-23 RX ORDER — FAMOTIDINE 20 MG/1
20 TABLET, FILM COATED ORAL 2 TIMES DAILY
Qty: 180 TABLET | Refills: 0 | Status: SHIPPED | OUTPATIENT
Start: 2025-07-23

## 2025-08-05 DIAGNOSIS — I10 ESSENTIAL HYPERTENSION: ICD-10-CM

## 2025-08-05 NOTE — TELEPHONE ENCOUNTER
Care Due:                  Date            Visit Type   Department     Provider  --------------------------------------------------------------------------------                                EP -                              PRIMARY      [facility] INTERNAL  Last Visit: 08-      CARE ([facility])   MEDICINE       Lexie Tesfaye                              EP -                              PRIMARY      [facility] INTERNAL  Next Visit: 08-      CARE ([facility])   MEDICINE       Lexie Tesfaye                                                            Last  Test          Frequency    Reason                     Performed    Due Date  --------------------------------------------------------------------------------    CMP.........  12 months..  atorvastatin, famotidine,   06- 05-                             hydroCHLOROthiazide......    Lipid Panel.  12 months..  atorvastatin.............  08- 08-    [facility] Embedded Care Due Messages. Reference number: [id number].   8/05/2025 12:34:48 PM CDT

## 2025-08-06 RX ORDER — HYDROCHLOROTHIAZIDE 12.5 MG/1
12.5 TABLET ORAL DAILY
Qty: 90 TABLET | Refills: 0 | Status: SHIPPED | OUTPATIENT
Start: 2025-08-06

## 2025-08-06 NOTE — TELEPHONE ENCOUNTER
Refill Routing Note   Medication(s) are not appropriate for processing by [facility] for the following reason(s):        Required labs outdated    [facility] action(s):  Defer             Appointments  past 12m or future 3m with PCP    Date Provider   Last Visit   8/6/2024 Lexie Tesfaye MD   Next Visit   8/26/2025 Lexie Tesfaye MD   ED visits in past 90 days: 0        Note composed:1:38 PM 08/06/2025

## 2025-08-19 ENCOUNTER — PATIENT OUTREACH (OUTPATIENT)
Dept: INTERNAL MEDICINE | Facility: CLINIC | Age: 75
End: 2025-08-19
Payer: MEDICARE

## 2025-08-19 ENCOUNTER — LAB VISIT (OUTPATIENT)
Dept: LAB | Facility: HOSPITAL | Age: 75
End: 2025-08-19
Attending: INTERNAL MEDICINE
Payer: MEDICARE

## 2025-08-19 DIAGNOSIS — I10 ESSENTIAL HYPERTENSION: ICD-10-CM

## 2025-08-19 DIAGNOSIS — E78.5 HYPERLIPIDEMIA, UNSPECIFIED HYPERLIPIDEMIA TYPE: ICD-10-CM

## 2025-08-19 DIAGNOSIS — C61 PROSTATE CANCER: ICD-10-CM

## 2025-08-19 DIAGNOSIS — N18.31 STAGE 3A CHRONIC KIDNEY DISEASE: Primary | ICD-10-CM

## 2025-08-19 DIAGNOSIS — D64.9 ANEMIA, UNSPECIFIED TYPE: ICD-10-CM

## 2025-08-19 LAB
ALBUMIN SERPL BCP-MCNC: 3.9 G/DL (ref 3.5–5.2)
ALP SERPL-CCNC: 102 UNIT/L (ref 40–150)
ALT SERPL W/O P-5'-P-CCNC: 24 UNIT/L (ref 0–55)
ANION GAP (OHS): 8 MMOL/L (ref 8–16)
AST SERPL-CCNC: 30 UNIT/L (ref 0–50)
BILIRUB SERPL-MCNC: 0.6 MG/DL (ref 0.1–1)
BUN SERPL-MCNC: 23 MG/DL (ref 8–23)
CALCIUM SERPL-MCNC: 9.3 MG/DL (ref 8.7–10.5)
CHLORIDE SERPL-SCNC: 109 MMOL/L (ref 95–110)
CHOLEST SERPL-MCNC: 136 MG/DL (ref 120–199)
CHOLEST/HDLC SERPL: 2.3 {RATIO} (ref 2–5)
CO2 SERPL-SCNC: 22 MMOL/L (ref 23–29)
CREAT SERPL-MCNC: 2 MG/DL (ref 0.5–1.4)
ERYTHROCYTE [DISTWIDTH] IN BLOOD BY AUTOMATED COUNT: 12.5 % (ref 11.5–14.5)
GFR SERPLBLD CREATININE-BSD FMLA CKD-EPI: 35 ML/MIN/1.73/M2
GLUCOSE SERPL-MCNC: 97 MG/DL (ref 70–110)
HCT VFR BLD AUTO: 39.6 % (ref 40–54)
HDLC SERPL-MCNC: 59 MG/DL (ref 40–75)
HDLC SERPL: 43.4 % (ref 20–50)
HGB BLD-MCNC: 13.2 GM/DL (ref 14–18)
LDLC SERPL CALC-MCNC: 62.2 MG/DL (ref 63–159)
MCH RBC QN AUTO: 30.8 PG (ref 27–31)
MCHC RBC AUTO-ENTMCNC: 33.3 G/DL (ref 32–36)
MCV RBC AUTO: 92 FL (ref 82–98)
NONHDLC SERPL-MCNC: 77 MG/DL
PLATELET # BLD AUTO: 287 K/UL (ref 150–450)
PMV BLD AUTO: 9.7 FL (ref 9.2–12.9)
POTASSIUM SERPL-SCNC: 4.8 MMOL/L (ref 3.5–5.1)
PROT SERPL-MCNC: 7.2 GM/DL (ref 6–8.4)
PSA SERPL-MCNC: 8.14 NG/ML
RBC # BLD AUTO: 4.29 M/UL (ref 4.6–6.2)
SODIUM SERPL-SCNC: 139 MMOL/L (ref 136–145)
TRIGL SERPL-MCNC: 74 MG/DL (ref 30–150)
WBC # BLD AUTO: 5.84 K/UL (ref 3.9–12.7)

## 2025-08-19 PROCEDURE — 80053 COMPREHEN METABOLIC PANEL: CPT | Mod: HCNC

## 2025-08-19 PROCEDURE — 82465 ASSAY BLD/SERUM CHOLESTEROL: CPT | Mod: HCNC

## 2025-08-19 PROCEDURE — 85027 COMPLETE CBC AUTOMATED: CPT | Mod: HCNC

## 2025-08-19 PROCEDURE — 84153 ASSAY OF PSA TOTAL: CPT | Mod: HCNC

## 2025-08-19 PROCEDURE — 36415 COLL VENOUS BLD VENIPUNCTURE: CPT | Mod: HCNC

## 2025-08-21 ENCOUNTER — HOSPITAL ENCOUNTER (OUTPATIENT)
Dept: RADIOLOGY | Facility: HOSPITAL | Age: 75
Discharge: HOME | End: 2025-08-21
Attending: INTERNAL MEDICINE
Payer: MEDICARE

## 2025-08-21 PROCEDURE — 71271 CT THORAX LUNG CANCER SCR C-: CPT | Mod: TC,HCNC

## 2025-08-21 PROCEDURE — 71271 CT THORAX LUNG CANCER SCR C-: CPT | Mod: 26,HCNC,, | Performed by: STUDENT IN AN ORGANIZED HEALTH CARE EDUCATION/TRAINING PROGRAM

## 2025-08-26 ENCOUNTER — LAB VISIT (OUTPATIENT)
Dept: LAB | Facility: HOSPITAL | Age: 75
End: 2025-08-26
Attending: INTERNAL MEDICINE
Payer: MEDICARE

## 2025-08-26 ENCOUNTER — OFFICE VISIT (OUTPATIENT)
Dept: INTERNAL MEDICINE | Facility: CLINIC | Age: 75
End: 2025-08-26
Payer: MEDICARE

## 2025-08-26 VITALS
HEART RATE: 87 BPM | DIASTOLIC BLOOD PRESSURE: 84 MMHG | BODY MASS INDEX: 24.5 KG/M2 | SYSTOLIC BLOOD PRESSURE: 110 MMHG | HEIGHT: 69 IN | WEIGHT: 165.38 LBS | OXYGEN SATURATION: 96 %

## 2025-08-26 DIAGNOSIS — R06.00 DYSPNEA, UNSPECIFIED TYPE: ICD-10-CM

## 2025-08-26 DIAGNOSIS — I10 ESSENTIAL HYPERTENSION: ICD-10-CM

## 2025-08-26 DIAGNOSIS — Z00.00 ANNUAL PHYSICAL EXAM: Primary | ICD-10-CM

## 2025-08-26 DIAGNOSIS — I70.0 AORTIC ATHEROSCLEROSIS: ICD-10-CM

## 2025-08-26 DIAGNOSIS — N18.32 STAGE 3B CHRONIC KIDNEY DISEASE: ICD-10-CM

## 2025-08-26 DIAGNOSIS — J43.2 CENTRILOBULAR EMPHYSEMA: ICD-10-CM

## 2025-08-26 DIAGNOSIS — N52.9 ERECTILE DYSFUNCTION, UNSPECIFIED ERECTILE DYSFUNCTION TYPE: ICD-10-CM

## 2025-08-26 DIAGNOSIS — E78.5 HYPERLIPIDEMIA, UNSPECIFIED HYPERLIPIDEMIA TYPE: ICD-10-CM

## 2025-08-26 LAB
BACTERIA #/AREA URNS AUTO: ABNORMAL /HPF
BILIRUB UR QL STRIP.AUTO: NEGATIVE
CLARITY UR: CLEAR
COLOR UR AUTO: YELLOW
CREAT UR-MCNC: >450 MG/DL (ref 23–375)
GLUCOSE UR QL STRIP: NEGATIVE
HGB UR QL STRIP: NEGATIVE
HYALINE CASTS UR QL AUTO: 39 /LPF (ref 0–1)
KETONES UR QL STRIP: NEGATIVE
LEUKOCYTE ESTERASE UR QL STRIP: ABNORMAL
MICROSCOPIC COMMENT: ABNORMAL
NITRITE UR QL STRIP: NEGATIVE
PH UR STRIP: 6 [PH]
PROT UR QL STRIP: ABNORMAL
PROT UR-MCNC: 25 MG/DL
PROT/CREAT UR: ABNORMAL MG/G{CREAT}
RBC #/AREA URNS AUTO: 4 /HPF (ref 0–4)
SP GR UR STRIP: >=1.03
SQUAMOUS #/AREA URNS AUTO: <1 /HPF
UROBILINOGEN UR STRIP-ACNC: ABNORMAL EU/DL
WBC #/AREA URNS AUTO: 3 /HPF (ref 0–5)

## 2025-08-26 PROCEDURE — 3066F NEPHROPATHY DOC TX: CPT | Mod: CPTII,HCNC,S$GLB, | Performed by: INTERNAL MEDICINE

## 2025-08-26 PROCEDURE — 1126F AMNT PAIN NOTED NONE PRSNT: CPT | Mod: CPTII,HCNC,S$GLB, | Performed by: INTERNAL MEDICINE

## 2025-08-26 PROCEDURE — 3061F NEG MICROALBUMINURIA REV: CPT | Mod: CPTII,HCNC,S$GLB, | Performed by: INTERNAL MEDICINE

## 2025-08-26 PROCEDURE — 81003 URINALYSIS AUTO W/O SCOPE: CPT | Mod: HCNC | Performed by: INTERNAL MEDICINE

## 2025-08-26 PROCEDURE — 84156 ASSAY OF PROTEIN URINE: CPT | Mod: HCNC | Performed by: INTERNAL MEDICINE

## 2025-08-26 PROCEDURE — 1160F RVW MEDS BY RX/DR IN RCRD: CPT | Mod: CPTII,HCNC,S$GLB, | Performed by: INTERNAL MEDICINE

## 2025-08-26 PROCEDURE — 3288F FALL RISK ASSESSMENT DOCD: CPT | Mod: CPTII,HCNC,S$GLB, | Performed by: INTERNAL MEDICINE

## 2025-08-26 PROCEDURE — 3008F BODY MASS INDEX DOCD: CPT | Mod: CPTII,HCNC,S$GLB, | Performed by: INTERNAL MEDICINE

## 2025-08-26 PROCEDURE — 1101F PT FALLS ASSESS-DOCD LE1/YR: CPT | Mod: CPTII,HCNC,S$GLB, | Performed by: INTERNAL MEDICINE

## 2025-08-26 PROCEDURE — 99999 PR PBB SHADOW E&M-EST. PATIENT-LVL III: CPT | Mod: PBBFAC,HCNC,, | Performed by: INTERNAL MEDICINE

## 2025-08-26 PROCEDURE — 3079F DIAST BP 80-89 MM HG: CPT | Mod: CPTII,HCNC,S$GLB, | Performed by: INTERNAL MEDICINE

## 2025-08-26 PROCEDURE — 99397 PER PM REEVAL EST PAT 65+ YR: CPT | Mod: HCNC,S$GLB,, | Performed by: INTERNAL MEDICINE

## 2025-08-26 PROCEDURE — 1159F MED LIST DOCD IN RCRD: CPT | Mod: CPTII,HCNC,S$GLB, | Performed by: INTERNAL MEDICINE

## 2025-08-26 PROCEDURE — 3074F SYST BP LT 130 MM HG: CPT | Mod: CPTII,HCNC,S$GLB, | Performed by: INTERNAL MEDICINE

## 2025-08-26 RX ORDER — FELODIPINE 5 MG/1
5 TABLET, EXTENDED RELEASE ORAL DAILY
Qty: 90 TABLET | Refills: 3 | Status: SHIPPED | OUTPATIENT
Start: 2025-08-26

## 2025-08-26 RX ORDER — SILDENAFIL 100 MG/1
100 TABLET, FILM COATED ORAL DAILY PRN
Qty: 10 TABLET | Refills: 11 | Status: SHIPPED | OUTPATIENT
Start: 2025-08-26 | End: 2026-08-26

## 2025-08-26 RX ORDER — FLUTICASONE FUROATE AND VILANTEROL 200; 25 UG/1; UG/1
1 POWDER RESPIRATORY (INHALATION) DAILY
Qty: 90 EACH | Refills: 3 | Status: SHIPPED | OUTPATIENT
Start: 2025-08-26

## 2025-08-26 RX ORDER — ALBUTEROL SULFATE 90 UG/1
2 INHALANT RESPIRATORY (INHALATION) EVERY 6 HOURS PRN
Qty: 18 G | Refills: 2 | Status: SHIPPED | OUTPATIENT
Start: 2025-08-26

## 2025-08-26 RX ORDER — ATORVASTATIN CALCIUM 40 MG/1
40 TABLET, FILM COATED ORAL DAILY
Qty: 90 TABLET | Refills: 3 | Status: SHIPPED | OUTPATIENT
Start: 2025-08-26

## 2025-08-26 RX ORDER — HYDROCHLOROTHIAZIDE 12.5 MG/1
12.5 TABLET ORAL DAILY
Qty: 90 TABLET | Refills: 3 | Status: SHIPPED | OUTPATIENT
Start: 2025-08-26

## 2025-08-27 LAB — HOLD SPECIMEN: NORMAL

## 2025-08-29 ENCOUNTER — OFFICE VISIT (OUTPATIENT)
Dept: UROLOGY | Facility: CLINIC | Age: 75
End: 2025-08-29
Payer: MEDICARE

## 2025-08-29 VITALS
HEIGHT: 69 IN | BODY MASS INDEX: 24.5 KG/M2 | WEIGHT: 165.38 LBS | HEART RATE: 83 BPM | SYSTOLIC BLOOD PRESSURE: 111 MMHG | DIASTOLIC BLOOD PRESSURE: 74 MMHG

## 2025-08-29 DIAGNOSIS — C61 PROSTATE CANCER: Primary | ICD-10-CM

## 2025-08-29 DIAGNOSIS — N18.32 STAGE 3B CHRONIC KIDNEY DISEASE: ICD-10-CM

## 2025-08-29 DIAGNOSIS — N40.1 BENIGN NON-NODULAR PROSTATIC HYPERPLASIA WITH LOWER URINARY TRACT SYMPTOMS: ICD-10-CM

## 2025-08-29 DIAGNOSIS — N52.01 ERECTILE DYSFUNCTION DUE TO ARTERIAL INSUFFICIENCY: ICD-10-CM

## 2025-08-29 DIAGNOSIS — I10 PRIMARY HYPERTENSION: ICD-10-CM

## 2025-08-29 PROCEDURE — 99999 PR PBB SHADOW E&M-EST. PATIENT-LVL III: CPT | Mod: PBBFAC,HCNC,, | Performed by: UROLOGY

## 2025-08-29 RX ORDER — CIPROFLOXACIN 500 MG/1
TABLET, FILM COATED ORAL
Qty: 1 TABLET | Refills: 0 | Status: SHIPPED | OUTPATIENT
Start: 2025-08-29

## 2025-08-29 RX ORDER — ENEMA 19; 7 G/133ML; G/133ML
1 ENEMA RECTAL ONCE
Qty: 1 ENEMA | Refills: 0 | Status: SHIPPED | OUTPATIENT
Start: 2025-08-29 | End: 2025-08-29

## 2025-09-02 ENCOUNTER — IMMUNIZATION (OUTPATIENT)
Dept: INTERNAL MEDICINE | Facility: CLINIC | Age: 75
End: 2025-09-02
Payer: MEDICARE

## 2025-09-02 DIAGNOSIS — Z23 NEED FOR VACCINATION: Primary | ICD-10-CM

## 2025-09-02 PROCEDURE — G0008 ADMIN INFLUENZA VIRUS VAC: HCPCS | Mod: HCNC,S$GLB,, | Performed by: INTERNAL MEDICINE

## 2025-09-02 PROCEDURE — 90653 IIV ADJUVANT VACCINE IM: CPT | Mod: HCNC,S$GLB,, | Performed by: INTERNAL MEDICINE

## 2025-09-02 PROCEDURE — 91320 SARSCV2 VAC 30MCG TRS-SUC IM: CPT | Mod: HCNC,S$GLB,, | Performed by: INTERNAL MEDICINE

## 2025-09-02 PROCEDURE — 90480 ADMN SARSCOV2 VAC 1/ONLY CMP: CPT | Mod: HCNC,S$GLB,, | Performed by: INTERNAL MEDICINE

## 2025-09-05 ENCOUNTER — RESEARCH ENCOUNTER (OUTPATIENT)
Dept: RESEARCH | Facility: HOSPITAL | Age: 75
End: 2025-09-05
Payer: MEDICARE

## (undated) DEVICE — GOWN SURGICAL X-LARGE

## (undated) DEVICE — DRESSING TELFA STRL 4X3 LF

## (undated) DEVICE — DRESSING TELFA N ADH 3X8

## (undated) DEVICE — DRAIN PENROSE XRAY 12 X 1/4 ST

## (undated) DEVICE — DRESSING TRANS 4X4 TEGADERM

## (undated) DEVICE — TRAY MINOR GEN SURG

## (undated) DEVICE — NDL HYPO REG 25G X 1 1/2

## (undated) DEVICE — SUT VICRYL 3-0 27 SH

## (undated) DEVICE — APPLICATOR CHLORAPREP ORN 26ML

## (undated) DEVICE — SUT PROLENE 0 MO6 30IN BLUE

## (undated) DEVICE — SEE MEDLINE ITEM 157148

## (undated) DEVICE — SEE MEDLINE ITEM 157117

## (undated) DEVICE — SUT 2-0 VICRYL / SH (J417)

## (undated) DEVICE — SUT VICRYL PLUS 4-0 P3 18IN

## (undated) DEVICE — ADHESIVE MASTISOL VIAL 48/BX

## (undated) DEVICE — ELECTRODE REM PLYHSV RETURN 9